# Patient Record
Sex: FEMALE | Race: WHITE | Employment: OTHER | URBAN - METROPOLITAN AREA
[De-identification: names, ages, dates, MRNs, and addresses within clinical notes are randomized per-mention and may not be internally consistent; named-entity substitution may affect disease eponyms.]

---

## 2017-02-17 ENCOUNTER — TELEPHONE (OUTPATIENT)
Dept: PULMONOLOGY | Age: 47
End: 2017-02-17

## 2017-03-24 PROBLEM — G89.4 CHRONIC PAIN SYNDROME: Status: ACTIVE | Noted: 2017-03-24

## 2017-04-05 ENCOUNTER — OFFICE VISIT (OUTPATIENT)
Dept: PULMONOLOGY | Age: 47
End: 2017-04-05

## 2017-04-05 VITALS
RESPIRATION RATE: 19 BRPM | HEART RATE: 78 BPM | SYSTOLIC BLOOD PRESSURE: 118 MMHG | HEIGHT: 64 IN | OXYGEN SATURATION: 93 % | BODY MASS INDEX: 35.68 KG/M2 | TEMPERATURE: 98.9 F | DIASTOLIC BLOOD PRESSURE: 76 MMHG | WEIGHT: 209 LBS

## 2017-04-05 DIAGNOSIS — Z23 NEED FOR PNEUMOCOCCAL VACCINE: ICD-10-CM

## 2017-04-05 DIAGNOSIS — J45.51 SEVERE PERSISTENT ASTHMA WITH ACUTE EXACERBATION: ICD-10-CM

## 2017-04-05 DIAGNOSIS — F11.10 DRUG ABUSE, OPIOID TYPE (HCC): ICD-10-CM

## 2017-04-05 DIAGNOSIS — J38.3 VOCAL CORD DYSFUNCTION: ICD-10-CM

## 2017-04-05 DIAGNOSIS — F41.9 ANXIETY: ICD-10-CM

## 2017-04-05 DIAGNOSIS — Z01.811 PREOP PULMONARY/RESPIRATORY EXAM: ICD-10-CM

## 2017-04-05 DIAGNOSIS — R05.9 COUGH: Primary | ICD-10-CM

## 2017-04-05 PROCEDURE — 99214 OFFICE O/P EST MOD 30 MIN: CPT | Performed by: NURSE PRACTITIONER

## 2017-04-05 PROCEDURE — 90732 PPSV23 VACC 2 YRS+ SUBQ/IM: CPT | Performed by: NURSE PRACTITIONER

## 2017-04-05 PROCEDURE — G0009 ADMIN PNEUMOCOCCAL VACCINE: HCPCS | Performed by: NURSE PRACTITIONER

## 2017-04-05 RX ORDER — GUAIFENESIN, PSEUDOEPHEDRINE HYDROCHLORIDE 600; 60 MG/1; MG/1
1 TABLET, EXTENDED RELEASE ORAL EVERY 12 HOURS
Qty: 14 TABLET | Refills: 0 | Status: SHIPPED | OUTPATIENT
Start: 2017-04-05 | End: 2017-04-12

## 2017-04-05 RX ORDER — IPRATROPIUM BROMIDE AND ALBUTEROL SULFATE 2.5; .5 MG/3ML; MG/3ML
1 SOLUTION RESPIRATORY (INHALATION) EVERY 6 HOURS
Status: ON HOLD | COMMUNITY
End: 2017-05-25

## 2017-04-05 ASSESSMENT — ASTHMA QUESTIONNAIRES
QUESTION_1 LAST FOUR WEEKS HOW MUCH OF THE TIME DID YOUR ASTHMA KEEP YOU FROM GETTING AS MUCH DONE AT WORK, SCHOOL OR AT HOME: 1
QUESTION_2 LAST FOUR WEEKS HOW OFTEN HAVE YOU HAD SHORTNESS OF BREATH: 1
QUESTION_5 LAST FOUR WEEKS HOW WOULD YOU RATE YOUR ASTHMA CONTROL: 1
QUESTION_3 LAST FOUR WEEKS HOW OFTEN DID YOUR ASTHMA SYMPTOMS (WHEEZING, COUGHING, SHORTNESS OF BREATH, CHEST TIGHTNESS OR PAIN) WAKE YOU UP AT NIGHT OR EARLIER THAN USUAL IN THE MORNING: 1
ACT_TOTALSCORE: 5
QUESTION_4 LAST FOUR WEEKS HOW OFTEN HAVE YOU USED YOUR RESCUE INHALER OR NEBULIZER MEDICATION (SUCH AS ALBUTEROL): 1

## 2017-05-05 ENCOUNTER — TELEPHONE (OUTPATIENT)
Dept: PULMONOLOGY | Age: 47
End: 2017-05-05

## 2017-05-16 ENCOUNTER — TELEPHONE (OUTPATIENT)
Dept: PULMONOLOGY | Age: 47
End: 2017-05-16

## 2017-05-16 RX ORDER — PREDNISONE 20 MG/1
40 TABLET ORAL DAILY
Qty: 10 TABLET | Refills: 0 | Status: ON HOLD | OUTPATIENT
Start: 2017-05-16 | End: 2017-05-25 | Stop reason: HOSPADM

## 2017-05-20 PROBLEM — R76.8 ELEVATED IGE LEVEL: Status: ACTIVE | Noted: 2017-05-20

## 2017-11-03 RX ORDER — BUDESONIDE AND FORMOTEROL FUMARATE DIHYDRATE 160; 4.5 UG/1; UG/1
AEROSOL RESPIRATORY (INHALATION)
Qty: 1 INHALER | Refills: 4 | Status: SHIPPED | OUTPATIENT
Start: 2017-11-03 | End: 2018-04-02 | Stop reason: SDUPTHER

## 2017-11-03 RX ORDER — IPRATROPIUM BROMIDE AND ALBUTEROL SULFATE 2.5; .5 MG/3ML; MG/3ML
SOLUTION RESPIRATORY (INHALATION)
Qty: 1 VIAL | Refills: 10 | Status: SHIPPED | OUTPATIENT
Start: 2017-11-03 | End: 2019-02-27 | Stop reason: SDUPTHER

## 2017-11-03 RX ORDER — BROMPHENIRAMINE MALEATE, PSEUDOEPHEDRINE HYDROCHLORIDE, AND DEXTROMETHORPHAN HYDROBROMIDE 2; 30; 10 MG/5ML; MG/5ML; MG/5ML
5 SYRUP ORAL 4 TIMES DAILY PRN
Qty: 1 BOTTLE | Refills: 0 | Status: ON HOLD | OUTPATIENT
Start: 2017-11-03 | End: 2018-02-15 | Stop reason: ALTCHOICE

## 2017-11-03 NOTE — TELEPHONE ENCOUNTER
Pt called stating that she needs everything that she got from the hospital, duoneb, symbicort, and bromfed, sent to [facility] instead [facility] (changed pharmacy)

## 2018-02-16 PROBLEM — E87.20 LACTIC ACIDOSIS: Status: ACTIVE | Noted: 2018-02-16

## 2018-02-16 PROBLEM — G89.4 CHRONIC PAIN SYNDROME: Status: RESOLVED | Noted: 2017-03-24 | Resolved: 2018-02-16

## 2018-02-16 PROBLEM — B34.8 INFECTION DUE TO HUMAN METAPNEUMOVIRUS (HMPV): Status: ACTIVE | Noted: 2018-02-16

## 2018-04-02 DIAGNOSIS — J45.901 ACUTE EXACERBATION OF COPD WITH ASTHMA (HCC): Primary | ICD-10-CM

## 2018-04-02 DIAGNOSIS — J44.1 ACUTE EXACERBATION OF COPD WITH ASTHMA (HCC): Primary | ICD-10-CM

## 2018-04-02 RX ORDER — BUDESONIDE AND FORMOTEROL FUMARATE DIHYDRATE 160; 4.5 UG/1; UG/1
AEROSOL RESPIRATORY (INHALATION)
Qty: 1 INHALER | Refills: 0 | Status: ON HOLD | OUTPATIENT
Start: 2018-04-02 | End: 2019-01-22 | Stop reason: SDUPTHER

## 2018-04-27 PROBLEM — R79.89 ELEVATED TROPONIN: Status: ACTIVE | Noted: 2018-04-27

## 2018-04-27 PROBLEM — R77.8 ELEVATED TROPONIN: Status: ACTIVE | Noted: 2018-04-27

## 2018-04-28 PROBLEM — J96.21 ACUTE ON CHRONIC RESPIRATORY FAILURE WITH HYPOXIA AND HYPERCAPNIA (HCC): Status: ACTIVE | Noted: 2018-04-28

## 2018-04-28 PROBLEM — J96.22 ACUTE ON CHRONIC RESPIRATORY FAILURE WITH HYPOXIA AND HYPERCAPNIA (HCC): Status: ACTIVE | Noted: 2018-04-28

## 2018-05-27 PROBLEM — R77.8 ELEVATED TROPONIN: Status: RESOLVED | Noted: 2018-04-27 | Resolved: 2018-05-27

## 2018-05-27 PROBLEM — R79.89 ELEVATED TROPONIN: Status: RESOLVED | Noted: 2018-04-27 | Resolved: 2018-05-27

## 2018-10-31 ENCOUNTER — TELEPHONE (OUTPATIENT)
Dept: PULMONOLOGY | Age: 48
End: 2018-10-31

## 2019-01-15 ENCOUNTER — HOSPITAL ENCOUNTER (INPATIENT)
Age: 49
LOS: 7 days | Discharge: HOME | DRG: 191 | End: 2019-01-22
Attending: EMERGENCY MEDICINE | Admitting: INTERNAL MEDICINE
Payer: COMMERCIAL

## 2019-01-15 ENCOUNTER — APPOINTMENT (OUTPATIENT)
Dept: GENERAL RADIOLOGY | Age: 49
DRG: 191 | End: 2019-01-15
Payer: COMMERCIAL

## 2019-01-15 DIAGNOSIS — J45.51 SEVERE PERSISTENT ASTHMA WITH EXACERBATION: Primary | ICD-10-CM

## 2019-01-15 PROBLEM — J96.11 CHRONIC RESPIRATORY FAILURE WITH HYPOXIA (HCC): Status: ACTIVE | Noted: 2019-01-15

## 2019-01-15 LAB
A/G RATIO: 1.2 (ref 1.1–2.2)
ALBUMIN SERPL-MCNC: 4.3 G/DL (ref 3.4–5)
ALP BLD-CCNC: 87 U/L (ref 40–129)
ALT SERPL-CCNC: 12 U/L (ref 10–40)
ANION GAP SERPL CALCULATED.3IONS-SCNC: 17 MMOL/L (ref 3–16)
AST SERPL-CCNC: 18 U/L (ref 15–37)
BASE EXCESS ARTERIAL: 0.1 MMOL/L (ref -3–3)
BASOPHILS ABSOLUTE: 0.1 K/UL (ref 0–0.2)
BASOPHILS RELATIVE PERCENT: 1.3 %
BILIRUB SERPL-MCNC: 0.5 MG/DL (ref 0–1)
BUN BLDV-MCNC: 17 MG/DL (ref 7–20)
CALCIUM SERPL-MCNC: 9.7 MG/DL (ref 8.3–10.6)
CARBOXYHEMOGLOBIN ARTERIAL: 2 % (ref 0–1.5)
CHLORIDE BLD-SCNC: 98 MMOL/L (ref 99–110)
CO2: 24 MMOL/L (ref 21–32)
CREAT SERPL-MCNC: 1.1 MG/DL (ref 0.6–1.1)
EOSINOPHILS ABSOLUTE: 1 K/UL (ref 0–0.6)
EOSINOPHILS RELATIVE PERCENT: 12.9 %
GFR AFRICAN AMERICAN: >60
GFR NON-AFRICAN AMERICAN: 52
GLOBULIN: 3.6 G/DL
GLUCOSE BLD-MCNC: 116 MG/DL (ref 70–99)
HCO3 ARTERIAL: 25 MMOL/L (ref 21–29)
HCT VFR BLD CALC: 43.6 % (ref 36–48)
HEMOGLOBIN, ART, EXTENDED: 14.7 G/DL (ref 12–16)
HEMOGLOBIN: 14.2 G/DL (ref 12–16)
LACTIC ACID, SEPSIS: 1.3 MMOL/L (ref 0.4–1.9)
LYMPHOCYTES ABSOLUTE: 1.5 K/UL (ref 1–5.1)
LYMPHOCYTES RELATIVE PERCENT: 19.5 %
MCH RBC QN AUTO: 27.4 PG (ref 26–34)
MCHC RBC AUTO-ENTMCNC: 32.6 G/DL (ref 31–36)
MCV RBC AUTO: 84.1 FL (ref 80–100)
METHEMOGLOBIN ARTERIAL: 0.6 %
MONOCYTES ABSOLUTE: 0.5 K/UL (ref 0–1.3)
MONOCYTES RELATIVE PERCENT: 5.9 %
NEUTROPHILS ABSOLUTE: 4.6 K/UL (ref 1.7–7.7)
NEUTROPHILS RELATIVE PERCENT: 60.4 %
O2 CONTENT ARTERIAL: 20 ML/DL
O2 SAT, ARTERIAL: 98.9 %
O2 THERAPY: ABNORMAL
PCO2 ARTERIAL: 41.2 MMHG (ref 35–45)
PDW BLD-RTO: 14.7 % (ref 12.4–15.4)
PH ARTERIAL: 7.42 (ref 7.35–7.45)
PLATELET # BLD: 172 K/UL (ref 135–450)
PMV BLD AUTO: 7.7 FL (ref 5–10.5)
PO2 ARTERIAL: 80.2 MMHG (ref 75–108)
POTASSIUM REFLEX MAGNESIUM: 4.9 MMOL/L (ref 3.5–5.1)
PRO-BNP: 59 PG/ML (ref 0–124)
RAPID INFLUENZA  B AGN: NEGATIVE
RAPID INFLUENZA A AGN: NEGATIVE
RBC # BLD: 5.19 M/UL (ref 4–5.2)
SODIUM BLD-SCNC: 139 MMOL/L (ref 136–145)
TCO2 ARTERIAL: 25 MMOL/L
TOTAL PROTEIN: 7.9 G/DL (ref 6.4–8.2)
TROPONIN: <0.01 NG/ML
WBC # BLD: 7.7 K/UL (ref 4–11)

## 2019-01-15 PROCEDURE — 36600 WITHDRAWAL OF ARTERIAL BLOOD: CPT

## 2019-01-15 PROCEDURE — 87040 BLOOD CULTURE FOR BACTERIA: CPT

## 2019-01-15 PROCEDURE — 6370000000 HC RX 637 (ALT 250 FOR IP): Performed by: EMERGENCY MEDICINE

## 2019-01-15 PROCEDURE — 2580000003 HC RX 258: Performed by: INTERNAL MEDICINE

## 2019-01-15 PROCEDURE — 6360000002 HC RX W HCPCS: Performed by: EMERGENCY MEDICINE

## 2019-01-15 PROCEDURE — 94664 DEMO&/EVAL PT USE INHALER: CPT

## 2019-01-15 PROCEDURE — 2060000000 HC ICU INTERMEDIATE R&B

## 2019-01-15 PROCEDURE — 93005 ELECTROCARDIOGRAM TRACING: CPT | Performed by: EMERGENCY MEDICINE

## 2019-01-15 PROCEDURE — 96361 HYDRATE IV INFUSION ADD-ON: CPT

## 2019-01-15 PROCEDURE — 6370000000 HC RX 637 (ALT 250 FOR IP): Performed by: INTERNAL MEDICINE

## 2019-01-15 PROCEDURE — 87804 INFLUENZA ASSAY W/OPTIC: CPT

## 2019-01-15 PROCEDURE — 94762 N-INVAS EAR/PLS OXIMTRY CONT: CPT

## 2019-01-15 PROCEDURE — 83880 ASSAY OF NATRIURETIC PEPTIDE: CPT

## 2019-01-15 PROCEDURE — 6360000002 HC RX W HCPCS: Performed by: INTERNAL MEDICINE

## 2019-01-15 PROCEDURE — 84484 ASSAY OF TROPONIN QUANT: CPT

## 2019-01-15 PROCEDURE — 82803 BLOOD GASES ANY COMBINATION: CPT

## 2019-01-15 PROCEDURE — 96376 TX/PRO/DX INJ SAME DRUG ADON: CPT

## 2019-01-15 PROCEDURE — 96365 THER/PROPH/DIAG IV INF INIT: CPT

## 2019-01-15 PROCEDURE — 83605 ASSAY OF LACTIC ACID: CPT

## 2019-01-15 PROCEDURE — 2700000000 HC OXYGEN THERAPY PER DAY

## 2019-01-15 PROCEDURE — 96375 TX/PRO/DX INJ NEW DRUG ADDON: CPT

## 2019-01-15 PROCEDURE — 80053 COMPREHEN METABOLIC PANEL: CPT

## 2019-01-15 PROCEDURE — 94640 AIRWAY INHALATION TREATMENT: CPT

## 2019-01-15 PROCEDURE — 2580000003 HC RX 258: Performed by: EMERGENCY MEDICINE

## 2019-01-15 PROCEDURE — 99285 EMERGENCY DEPT VISIT HI MDM: CPT

## 2019-01-15 PROCEDURE — 71045 X-RAY EXAM CHEST 1 VIEW: CPT

## 2019-01-15 PROCEDURE — 96374 THER/PROPH/DIAG INJ IV PUSH: CPT

## 2019-01-15 PROCEDURE — 85025 COMPLETE CBC W/AUTO DIFF WBC: CPT

## 2019-01-15 RX ORDER — METHYLPREDNISOLONE SODIUM SUCCINATE 125 MG/2ML
125 INJECTION, POWDER, LYOPHILIZED, FOR SOLUTION INTRAMUSCULAR; INTRAVENOUS ONCE
Status: COMPLETED | OUTPATIENT
Start: 2019-01-15 | End: 2019-01-15

## 2019-01-15 RX ORDER — SODIUM CHLORIDE 0.9 % (FLUSH) 0.9 %
10 SYRINGE (ML) INJECTION PRN
Status: DISCONTINUED | OUTPATIENT
Start: 2019-01-15 | End: 2019-01-22 | Stop reason: HOSPADM

## 2019-01-15 RX ORDER — ALBUTEROL SULFATE 2.5 MG/3ML
5 SOLUTION RESPIRATORY (INHALATION) ONCE
Status: COMPLETED | OUTPATIENT
Start: 2019-01-15 | End: 2019-01-15

## 2019-01-15 RX ORDER — IPRATROPIUM BROMIDE AND ALBUTEROL SULFATE 2.5; .5 MG/3ML; MG/3ML
1 SOLUTION RESPIRATORY (INHALATION)
Status: DISCONTINUED | OUTPATIENT
Start: 2019-01-15 | End: 2019-01-22 | Stop reason: HOSPADM

## 2019-01-15 RX ORDER — ALPRAZOLAM 0.5 MG/1
1 TABLET ORAL 4 TIMES DAILY
Status: DISCONTINUED | OUTPATIENT
Start: 2019-01-15 | End: 2019-01-22 | Stop reason: HOSPADM

## 2019-01-15 RX ORDER — OXYCODONE HYDROCHLORIDE 5 MG/1
15 TABLET ORAL EVERY 4 HOURS PRN
Status: DISCONTINUED | OUTPATIENT
Start: 2019-01-15 | End: 2019-01-22 | Stop reason: HOSPADM

## 2019-01-15 RX ORDER — MONTELUKAST SODIUM 10 MG/1
10 TABLET ORAL NIGHTLY
Status: DISCONTINUED | OUTPATIENT
Start: 2019-01-15 | End: 2019-01-22 | Stop reason: HOSPADM

## 2019-01-15 RX ORDER — THEOPHYLLINE 400 MG/1
400 TABLET, EXTENDED RELEASE ORAL DAILY
Status: DISCONTINUED | OUTPATIENT
Start: 2019-01-15 | End: 2019-01-22 | Stop reason: HOSPADM

## 2019-01-15 RX ORDER — METHADONE HYDROCHLORIDE 10 MG/1
20 TABLET ORAL EVERY EVENING
Status: DISCONTINUED | OUTPATIENT
Start: 2019-01-15 | End: 2019-01-22 | Stop reason: HOSPADM

## 2019-01-15 RX ORDER — IPRATROPIUM BROMIDE AND ALBUTEROL SULFATE 2.5; .5 MG/3ML; MG/3ML
3 SOLUTION RESPIRATORY (INHALATION) ONCE
Status: COMPLETED | OUTPATIENT
Start: 2019-01-15 | End: 2019-01-15

## 2019-01-15 RX ORDER — ONDANSETRON 2 MG/ML
4 INJECTION INTRAMUSCULAR; INTRAVENOUS EVERY 4 HOURS PRN
Status: DISCONTINUED | OUTPATIENT
Start: 2019-01-15 | End: 2019-01-22 | Stop reason: HOSPADM

## 2019-01-15 RX ORDER — BISACODYL 10 MG
10 SUPPOSITORY, RECTAL RECTAL DAILY PRN
Status: DISCONTINUED | OUTPATIENT
Start: 2019-01-15 | End: 2019-01-22 | Stop reason: HOSPADM

## 2019-01-15 RX ORDER — FLUTICASONE PROPIONATE 50 MCG
1 SPRAY, SUSPENSION (ML) NASAL NIGHTLY
Status: DISCONTINUED | OUTPATIENT
Start: 2019-01-15 | End: 2019-01-22 | Stop reason: HOSPADM

## 2019-01-15 RX ORDER — DOCUSATE SODIUM 100 MG/1
100 CAPSULE, LIQUID FILLED ORAL 2 TIMES DAILY PRN
Status: DISCONTINUED | OUTPATIENT
Start: 2019-01-15 | End: 2019-01-22 | Stop reason: HOSPADM

## 2019-01-15 RX ORDER — METHYLPREDNISOLONE SODIUM SUCCINATE 40 MG/ML
40 INJECTION, POWDER, LYOPHILIZED, FOR SOLUTION INTRAMUSCULAR; INTRAVENOUS EVERY 6 HOURS SCHEDULED
Status: DISCONTINUED | OUTPATIENT
Start: 2019-01-15 | End: 2019-01-16

## 2019-01-15 RX ORDER — METHADONE HYDROCHLORIDE 10 MG/1
30 TABLET ORAL EVERY MORNING
Status: DISCONTINUED | OUTPATIENT
Start: 2019-01-16 | End: 2019-01-22 | Stop reason: HOSPADM

## 2019-01-15 RX ORDER — PREDNISONE 20 MG/1
60 TABLET ORAL ONCE
Status: DISCONTINUED | OUTPATIENT
Start: 2019-01-15 | End: 2019-01-15

## 2019-01-15 RX ORDER — 0.9 % SODIUM CHLORIDE 0.9 %
1000 INTRAVENOUS SOLUTION INTRAVENOUS ONCE
Status: COMPLETED | OUTPATIENT
Start: 2019-01-15 | End: 2019-01-15

## 2019-01-15 RX ORDER — ONDANSETRON 2 MG/ML
4 INJECTION INTRAMUSCULAR; INTRAVENOUS ONCE
Status: COMPLETED | OUTPATIENT
Start: 2019-01-15 | End: 2019-01-15

## 2019-01-15 RX ORDER — FAMOTIDINE 20 MG/1
20 TABLET, FILM COATED ORAL 2 TIMES DAILY
Status: DISCONTINUED | OUTPATIENT
Start: 2019-01-15 | End: 2019-01-22 | Stop reason: HOSPADM

## 2019-01-15 RX ORDER — OXYMETAZOLINE HYDROCHLORIDE 0.05 G/100ML
2 SPRAY NASAL 2 TIMES DAILY PRN
Status: ON HOLD | COMMUNITY
End: 2022-01-20 | Stop reason: SDUPTHER

## 2019-01-15 RX ORDER — LORAZEPAM 2 MG/ML
1 INJECTION INTRAMUSCULAR ONCE
Status: COMPLETED | OUTPATIENT
Start: 2019-01-15 | End: 2019-01-15

## 2019-01-15 RX ORDER — SODIUM CHLORIDE 0.9 % (FLUSH) 0.9 %
10 SYRINGE (ML) INJECTION EVERY 12 HOURS SCHEDULED
Status: DISCONTINUED | OUTPATIENT
Start: 2019-01-15 | End: 2019-01-22 | Stop reason: HOSPADM

## 2019-01-15 RX ADMIN — IPRATROPIUM BROMIDE AND ALBUTEROL SULFATE 1 AMPULE: 2.5; .5 SOLUTION RESPIRATORY (INHALATION) at 20:07

## 2019-01-15 RX ADMIN — ALPRAZOLAM 1 MG: 0.5 TABLET ORAL at 21:16

## 2019-01-15 RX ADMIN — SODIUM CHLORIDE 1000 ML: 9 INJECTION, SOLUTION INTRAVENOUS at 12:48

## 2019-01-15 RX ADMIN — OXYCODONE HYDROCHLORIDE 15 MG: 5 TABLET ORAL at 21:15

## 2019-01-15 RX ADMIN — ALBUTEROL SULFATE 5 MG: 2.5 SOLUTION RESPIRATORY (INHALATION) at 14:05

## 2019-01-15 RX ADMIN — FAMOTIDINE 20 MG: 20 TABLET ORAL at 21:16

## 2019-01-15 RX ADMIN — MONTELUKAST SODIUM 10 MG: 10 TABLET, FILM COATED ORAL at 21:16

## 2019-01-15 RX ADMIN — MOMETASONE FUROATE AND FORMOTEROL FUMARATE DIHYDRATE 2 PUFF: 200; 5 AEROSOL RESPIRATORY (INHALATION) at 20:07

## 2019-01-15 RX ADMIN — FLUTICASONE PROPIONATE 1 SPRAY: 50 SPRAY, METERED NASAL at 21:16

## 2019-01-15 RX ADMIN — ALBUTEROL SULFATE 5 MG: 2.5 SOLUTION RESPIRATORY (INHALATION) at 15:05

## 2019-01-15 RX ADMIN — LORAZEPAM 1 MG: 2 INJECTION INTRAMUSCULAR; INTRAVENOUS at 12:25

## 2019-01-15 RX ADMIN — AZITHROMYCIN MONOHYDRATE 500 MG: 500 INJECTION, POWDER, LYOPHILIZED, FOR SOLUTION INTRAVENOUS at 21:16

## 2019-01-15 RX ADMIN — Medication 10 ML: at 21:22

## 2019-01-15 RX ADMIN — OXYCODONE HYDROCHLORIDE 15 MG: 5 TABLET ORAL at 16:57

## 2019-01-15 RX ADMIN — METHADONE HYDROCHLORIDE 20 MG: 10 TABLET ORAL at 16:57

## 2019-01-15 RX ADMIN — HYDROMORPHONE HYDROCHLORIDE 1 MG: 1 INJECTION, SOLUTION INTRAMUSCULAR; INTRAVENOUS; SUBCUTANEOUS at 13:23

## 2019-01-15 RX ADMIN — METHYLPREDNISOLONE SODIUM SUCCINATE 125 MG: 125 INJECTION, POWDER, FOR SOLUTION INTRAMUSCULAR; INTRAVENOUS at 12:28

## 2019-01-15 RX ADMIN — ONDANSETRON 4 MG: 2 INJECTION INTRAMUSCULAR; INTRAVENOUS at 13:23

## 2019-01-15 RX ADMIN — ALPRAZOLAM 1 MG: 0.5 TABLET ORAL at 16:56

## 2019-01-15 RX ADMIN — METHYLPREDNISOLONE SODIUM SUCCINATE 40 MG: 40 INJECTION, POWDER, FOR SOLUTION INTRAMUSCULAR; INTRAVENOUS at 18:53

## 2019-01-15 RX ADMIN — IPRATROPIUM BROMIDE AND ALBUTEROL SULFATE 3 AMPULE: .5; 3 SOLUTION RESPIRATORY (INHALATION) at 12:15

## 2019-01-15 ASSESSMENT — PAIN DESCRIPTION - PAIN TYPE
TYPE: CHRONIC PAIN
TYPE: CHRONIC PAIN

## 2019-01-15 ASSESSMENT — PAIN SCALES - GENERAL
PAINLEVEL_OUTOF10: 8
PAINLEVEL_OUTOF10: 8
PAINLEVEL_OUTOF10: 4
PAINLEVEL_OUTOF10: 7
PAINLEVEL_OUTOF10: 9
PAINLEVEL_OUTOF10: 6
PAINLEVEL_OUTOF10: 4

## 2019-01-15 ASSESSMENT — PAIN DESCRIPTION - LOCATION
LOCATION: BACK;HEAD
LOCATION: LEG;KNEE

## 2019-01-15 ASSESSMENT — PAIN DESCRIPTION - ORIENTATION: ORIENTATION: RIGHT;LEFT

## 2019-01-16 LAB
ANION GAP SERPL CALCULATED.3IONS-SCNC: 13 MMOL/L (ref 3–16)
BASOPHILS ABSOLUTE: 0 K/UL (ref 0–0.2)
BASOPHILS RELATIVE PERCENT: 0.3 %
BUN BLDV-MCNC: 21 MG/DL (ref 7–20)
CALCIUM SERPL-MCNC: 9.5 MG/DL (ref 8.3–10.6)
CHLORIDE BLD-SCNC: 100 MMOL/L (ref 99–110)
CO2: 25 MMOL/L (ref 21–32)
CREAT SERPL-MCNC: 0.8 MG/DL (ref 0.6–1.1)
EKG ATRIAL RATE: 135 BPM
EKG DIAGNOSIS: NORMAL
EKG P AXIS: 78 DEGREES
EKG P-R INTERVAL: 130 MS
EKG Q-T INTERVAL: 286 MS
EKG QRS DURATION: 72 MS
EKG QTC CALCULATION (BAZETT): 429 MS
EKG R AXIS: 43 DEGREES
EKG T AXIS: 53 DEGREES
EKG VENTRICULAR RATE: 135 BPM
EOSINOPHILS ABSOLUTE: 0 K/UL (ref 0–0.6)
EOSINOPHILS RELATIVE PERCENT: 0 %
GFR AFRICAN AMERICAN: >60
GFR NON-AFRICAN AMERICAN: >60
GLUCOSE BLD-MCNC: 138 MG/DL (ref 70–99)
HCT VFR BLD CALC: 39.3 % (ref 36–48)
HEMOGLOBIN: 13.2 G/DL (ref 12–16)
LYMPHOCYTES ABSOLUTE: 0.5 K/UL (ref 1–5.1)
LYMPHOCYTES RELATIVE PERCENT: 5.6 %
MCH RBC QN AUTO: 28.2 PG (ref 26–34)
MCHC RBC AUTO-ENTMCNC: 33.5 G/DL (ref 31–36)
MCV RBC AUTO: 84 FL (ref 80–100)
MONOCYTES ABSOLUTE: 0.1 K/UL (ref 0–1.3)
MONOCYTES RELATIVE PERCENT: 1.4 %
NEUTROPHILS ABSOLUTE: 7.6 K/UL (ref 1.7–7.7)
NEUTROPHILS RELATIVE PERCENT: 92.7 %
PDW BLD-RTO: 14.8 % (ref 12.4–15.4)
PLATELET # BLD: 223 K/UL (ref 135–450)
PMV BLD AUTO: 7.7 FL (ref 5–10.5)
POTASSIUM REFLEX MAGNESIUM: 4.7 MMOL/L (ref 3.5–5.1)
RBC # BLD: 4.68 M/UL (ref 4–5.2)
REPORT: NORMAL
RESPIRATORY PANEL PCR: NORMAL
SODIUM BLD-SCNC: 138 MMOL/L (ref 136–145)
WBC # BLD: 8.2 K/UL (ref 4–11)

## 2019-01-16 PROCEDURE — 87633 RESP VIRUS 12-25 TARGETS: CPT

## 2019-01-16 PROCEDURE — 99223 1ST HOSP IP/OBS HIGH 75: CPT | Performed by: INTERNAL MEDICINE

## 2019-01-16 PROCEDURE — 94640 AIRWAY INHALATION TREATMENT: CPT

## 2019-01-16 PROCEDURE — 96376 TX/PRO/DX INJ SAME DRUG ADON: CPT

## 2019-01-16 PROCEDURE — 2700000000 HC OXYGEN THERAPY PER DAY

## 2019-01-16 PROCEDURE — 6370000000 HC RX 637 (ALT 250 FOR IP): Performed by: INTERNAL MEDICINE

## 2019-01-16 PROCEDURE — 96366 THER/PROPH/DIAG IV INF ADDON: CPT

## 2019-01-16 PROCEDURE — 6360000002 HC RX W HCPCS: Performed by: INTERNAL MEDICINE

## 2019-01-16 PROCEDURE — 36415 COLL VENOUS BLD VENIPUNCTURE: CPT

## 2019-01-16 PROCEDURE — 87798 DETECT AGENT NOS DNA AMP: CPT

## 2019-01-16 PROCEDURE — 87581 M.PNEUMON DNA AMP PROBE: CPT

## 2019-01-16 PROCEDURE — 2060000000 HC ICU INTERMEDIATE R&B

## 2019-01-16 PROCEDURE — 93010 ELECTROCARDIOGRAM REPORT: CPT | Performed by: INTERNAL MEDICINE

## 2019-01-16 PROCEDURE — 94761 N-INVAS EAR/PLS OXIMETRY MLT: CPT

## 2019-01-16 PROCEDURE — 85025 COMPLETE CBC W/AUTO DIFF WBC: CPT

## 2019-01-16 PROCEDURE — 87486 CHLMYD PNEUM DNA AMP PROBE: CPT

## 2019-01-16 PROCEDURE — 80048 BASIC METABOLIC PNL TOTAL CA: CPT

## 2019-01-16 PROCEDURE — 2580000003 HC RX 258: Performed by: INTERNAL MEDICINE

## 2019-01-16 RX ORDER — FORMOTEROL FUMARATE 20 UG/2ML
20 SOLUTION RESPIRATORY (INHALATION) 2 TIMES DAILY
Status: DISCONTINUED | OUTPATIENT
Start: 2019-01-16 | End: 2019-01-22 | Stop reason: HOSPADM

## 2019-01-16 RX ORDER — BUDESONIDE 0.5 MG/2ML
0.5 INHALANT ORAL 2 TIMES DAILY
Status: DISCONTINUED | OUTPATIENT
Start: 2019-01-16 | End: 2019-01-22 | Stop reason: HOSPADM

## 2019-01-16 RX ORDER — METHYLPREDNISOLONE SODIUM SUCCINATE 125 MG/2ML
60 INJECTION, POWDER, LYOPHILIZED, FOR SOLUTION INTRAMUSCULAR; INTRAVENOUS EVERY 6 HOURS SCHEDULED
Status: DISCONTINUED | OUTPATIENT
Start: 2019-01-16 | End: 2019-01-17

## 2019-01-16 RX ADMIN — FAMOTIDINE 20 MG: 20 TABLET ORAL at 09:28

## 2019-01-16 RX ADMIN — IPRATROPIUM BROMIDE AND ALBUTEROL SULFATE 1 AMPULE: 2.5; .5 SOLUTION RESPIRATORY (INHALATION) at 08:16

## 2019-01-16 RX ADMIN — Medication 10 ML: at 09:33

## 2019-01-16 RX ADMIN — Medication 10 ML: at 20:50

## 2019-01-16 RX ADMIN — OXYCODONE HYDROCHLORIDE 15 MG: 5 TABLET ORAL at 01:13

## 2019-01-16 RX ADMIN — METHYLPREDNISOLONE SODIUM SUCCINATE 60 MG: 125 INJECTION, POWDER, FOR SOLUTION INTRAMUSCULAR; INTRAVENOUS at 16:44

## 2019-01-16 RX ADMIN — MONTELUKAST SODIUM 10 MG: 10 TABLET, FILM COATED ORAL at 20:48

## 2019-01-16 RX ADMIN — IPRATROPIUM BROMIDE AND ALBUTEROL SULFATE 1 AMPULE: 2.5; .5 SOLUTION RESPIRATORY (INHALATION) at 19:35

## 2019-01-16 RX ADMIN — BUDESONIDE 500 MCG: 0.5 SUSPENSION RESPIRATORY (INHALATION) at 19:35

## 2019-01-16 RX ADMIN — OXYCODONE HYDROCHLORIDE 15 MG: 5 TABLET ORAL at 23:33

## 2019-01-16 RX ADMIN — METHYLPREDNISOLONE SODIUM SUCCINATE 40 MG: 40 INJECTION, POWDER, FOR SOLUTION INTRAMUSCULAR; INTRAVENOUS at 10:09

## 2019-01-16 RX ADMIN — METHYLPREDNISOLONE SODIUM SUCCINATE 40 MG: 40 INJECTION, POWDER, FOR SOLUTION INTRAMUSCULAR; INTRAVENOUS at 00:41

## 2019-01-16 RX ADMIN — ALPRAZOLAM 1 MG: 0.5 TABLET ORAL at 05:21

## 2019-01-16 RX ADMIN — FORMOTEROL FUMARATE DIHYDRATE 20 MCG: 20 SOLUTION RESPIRATORY (INHALATION) at 12:19

## 2019-01-16 RX ADMIN — ALPRAZOLAM 1 MG: 0.5 TABLET ORAL at 14:43

## 2019-01-16 RX ADMIN — METHYLPREDNISOLONE SODIUM SUCCINATE 40 MG: 40 INJECTION, POWDER, FOR SOLUTION INTRAMUSCULAR; INTRAVENOUS at 05:21

## 2019-01-16 RX ADMIN — OXYCODONE HYDROCHLORIDE 15 MG: 5 TABLET ORAL at 09:33

## 2019-01-16 RX ADMIN — OXYCODONE HYDROCHLORIDE 15 MG: 5 TABLET ORAL at 19:29

## 2019-01-16 RX ADMIN — OXYCODONE HYDROCHLORIDE 15 MG: 5 TABLET ORAL at 05:21

## 2019-01-16 RX ADMIN — IPRATROPIUM BROMIDE AND ALBUTEROL SULFATE 1 AMPULE: 2.5; .5 SOLUTION RESPIRATORY (INHALATION) at 12:19

## 2019-01-16 RX ADMIN — IPRATROPIUM BROMIDE AND ALBUTEROL SULFATE 1 AMPULE: 2.5; .5 SOLUTION RESPIRATORY (INHALATION) at 15:40

## 2019-01-16 RX ADMIN — MOMETASONE FUROATE AND FORMOTEROL FUMARATE DIHYDRATE 2 PUFF: 200; 5 AEROSOL RESPIRATORY (INHALATION) at 08:16

## 2019-01-16 RX ADMIN — METHADONE HYDROCHLORIDE 30 MG: 10 TABLET ORAL at 05:21

## 2019-01-16 RX ADMIN — FAMOTIDINE 20 MG: 20 TABLET ORAL at 20:48

## 2019-01-16 RX ADMIN — OXYCODONE HYDROCHLORIDE 15 MG: 5 TABLET ORAL at 14:38

## 2019-01-16 RX ADMIN — FLUTICASONE PROPIONATE 1 SPRAY: 50 SPRAY, METERED NASAL at 23:35

## 2019-01-16 RX ADMIN — FORMOTEROL FUMARATE DIHYDRATE 20 MCG: 20 SOLUTION RESPIRATORY (INHALATION) at 19:35

## 2019-01-16 RX ADMIN — ALPRAZOLAM 1 MG: 0.5 TABLET ORAL at 10:08

## 2019-01-16 RX ADMIN — METHADONE HYDROCHLORIDE 20 MG: 10 TABLET ORAL at 16:44

## 2019-01-16 RX ADMIN — BUDESONIDE 500 MCG: 0.5 SUSPENSION RESPIRATORY (INHALATION) at 12:19

## 2019-01-16 RX ADMIN — ALPRAZOLAM 1 MG: 0.5 TABLET ORAL at 20:48

## 2019-01-16 RX ADMIN — AZITHROMYCIN MONOHYDRATE 500 MG: 500 INJECTION, POWDER, LYOPHILIZED, FOR SOLUTION INTRAVENOUS at 20:57

## 2019-01-16 RX ADMIN — THEOPHYLLINE 400 MG: 400 TABLET, EXTENDED RELEASE ORAL at 09:28

## 2019-01-16 ASSESSMENT — PAIN DESCRIPTION - ONSET: ONSET: ON-GOING

## 2019-01-16 ASSESSMENT — PAIN DESCRIPTION - PAIN TYPE
TYPE: CHRONIC PAIN

## 2019-01-16 ASSESSMENT — PAIN SCALES - GENERAL
PAINLEVEL_OUTOF10: 8
PAINLEVEL_OUTOF10: 5
PAINLEVEL_OUTOF10: 8
PAINLEVEL_OUTOF10: 7
PAINLEVEL_OUTOF10: 4
PAINLEVEL_OUTOF10: 4
PAINLEVEL_OUTOF10: 7

## 2019-01-16 ASSESSMENT — PAIN DESCRIPTION - LOCATION
LOCATION: KNEE;LEG;CHEST
LOCATION: KNEE

## 2019-01-16 ASSESSMENT — PAIN DESCRIPTION - ORIENTATION
ORIENTATION: RIGHT;LEFT
ORIENTATION: RIGHT;LEFT

## 2019-01-16 ASSESSMENT — PAIN - FUNCTIONAL ASSESSMENT
PAIN_FUNCTIONAL_ASSESSMENT: PREVENTS OR INTERFERES SOME ACTIVE ACTIVITIES AND ADLS
PAIN_FUNCTIONAL_ASSESSMENT: PREVENTS OR INTERFERES SOME ACTIVE ACTIVITIES AND ADLS

## 2019-01-16 ASSESSMENT — PAIN DESCRIPTION - DESCRIPTORS: DESCRIPTORS: ACHING

## 2019-01-17 LAB
ANION GAP SERPL CALCULATED.3IONS-SCNC: 16 MMOL/L (ref 3–16)
BASOPHILS ABSOLUTE: 0 K/UL (ref 0–0.2)
BASOPHILS RELATIVE PERCENT: 0.3 %
BUN BLDV-MCNC: 24 MG/DL (ref 7–20)
CALCIUM SERPL-MCNC: 9.9 MG/DL (ref 8.3–10.6)
CHLORIDE BLD-SCNC: 95 MMOL/L (ref 99–110)
CO2: 26 MMOL/L (ref 21–32)
CREAT SERPL-MCNC: 0.8 MG/DL (ref 0.6–1.1)
EOSINOPHILS ABSOLUTE: 0 K/UL (ref 0–0.6)
EOSINOPHILS RELATIVE PERCENT: 0.1 %
GFR AFRICAN AMERICAN: >60
GFR NON-AFRICAN AMERICAN: >60
GLUCOSE BLD-MCNC: 132 MG/DL (ref 70–99)
HCT VFR BLD CALC: 43.8 % (ref 36–48)
HEMOGLOBIN: 14.4 G/DL (ref 12–16)
LYMPHOCYTES ABSOLUTE: 0.5 K/UL (ref 1–5.1)
LYMPHOCYTES RELATIVE PERCENT: 4.6 %
MCH RBC QN AUTO: 27.2 PG (ref 26–34)
MCHC RBC AUTO-ENTMCNC: 32.8 G/DL (ref 31–36)
MCV RBC AUTO: 83 FL (ref 80–100)
MONOCYTES ABSOLUTE: 0.3 K/UL (ref 0–1.3)
MONOCYTES RELATIVE PERCENT: 3.1 %
NEUTROPHILS ABSOLUTE: 10 K/UL (ref 1.7–7.7)
NEUTROPHILS RELATIVE PERCENT: 91.9 %
PDW BLD-RTO: 15.1 % (ref 12.4–15.4)
PLATELET # BLD: 286 K/UL (ref 135–450)
PMV BLD AUTO: 7.6 FL (ref 5–10.5)
POTASSIUM REFLEX MAGNESIUM: 4.2 MMOL/L (ref 3.5–5.1)
RBC # BLD: 5.27 M/UL (ref 4–5.2)
SODIUM BLD-SCNC: 137 MMOL/L (ref 136–145)
THEOPHYLLINE LEVEL: 4.5 UG/ML (ref 8–20)
WBC # BLD: 10.9 K/UL (ref 4–11)

## 2019-01-17 PROCEDURE — 6360000002 HC RX W HCPCS: Performed by: INTERNAL MEDICINE

## 2019-01-17 PROCEDURE — 94640 AIRWAY INHALATION TREATMENT: CPT

## 2019-01-17 PROCEDURE — 99233 SBSQ HOSP IP/OBS HIGH 50: CPT | Performed by: INTERNAL MEDICINE

## 2019-01-17 PROCEDURE — 80198 ASSAY OF THEOPHYLLINE: CPT

## 2019-01-17 PROCEDURE — 6370000000 HC RX 637 (ALT 250 FOR IP): Performed by: INTERNAL MEDICINE

## 2019-01-17 PROCEDURE — 2700000000 HC OXYGEN THERAPY PER DAY

## 2019-01-17 PROCEDURE — 85025 COMPLETE CBC W/AUTO DIFF WBC: CPT

## 2019-01-17 PROCEDURE — 2060000000 HC ICU INTERMEDIATE R&B

## 2019-01-17 PROCEDURE — 2580000003 HC RX 258: Performed by: INTERNAL MEDICINE

## 2019-01-17 PROCEDURE — 80048 BASIC METABOLIC PNL TOTAL CA: CPT

## 2019-01-17 PROCEDURE — 96376 TX/PRO/DX INJ SAME DRUG ADON: CPT

## 2019-01-17 PROCEDURE — 94761 N-INVAS EAR/PLS OXIMETRY MLT: CPT

## 2019-01-17 PROCEDURE — 36415 COLL VENOUS BLD VENIPUNCTURE: CPT

## 2019-01-17 PROCEDURE — 96366 THER/PROPH/DIAG IV INF ADDON: CPT

## 2019-01-17 RX ORDER — ZOLPIDEM TARTRATE 5 MG/1
5 TABLET ORAL NIGHTLY PRN
Status: DISCONTINUED | OUTPATIENT
Start: 2019-01-17 | End: 2019-01-22 | Stop reason: HOSPADM

## 2019-01-17 RX ORDER — METHYLPREDNISOLONE SODIUM SUCCINATE 125 MG/2ML
60 INJECTION, POWDER, LYOPHILIZED, FOR SOLUTION INTRAMUSCULAR; INTRAVENOUS EVERY 8 HOURS
Status: DISCONTINUED | OUTPATIENT
Start: 2019-01-17 | End: 2019-01-18

## 2019-01-17 RX ORDER — GUAIFENESIN/DEXTROMETHORPHAN 100-10MG/5
10 SYRUP ORAL EVERY 4 HOURS PRN
Status: DISCONTINUED | OUTPATIENT
Start: 2019-01-17 | End: 2019-01-22 | Stop reason: HOSPADM

## 2019-01-17 RX ADMIN — METHADONE HYDROCHLORIDE 30 MG: 10 TABLET ORAL at 04:30

## 2019-01-17 RX ADMIN — Medication 10 ML: at 08:28

## 2019-01-17 RX ADMIN — IPRATROPIUM BROMIDE AND ALBUTEROL SULFATE 1 AMPULE: 2.5; .5 SOLUTION RESPIRATORY (INHALATION) at 07:51

## 2019-01-17 RX ADMIN — ALPRAZOLAM 1 MG: 0.5 TABLET ORAL at 04:30

## 2019-01-17 RX ADMIN — THEOPHYLLINE 400 MG: 400 TABLET, EXTENDED RELEASE ORAL at 08:27

## 2019-01-17 RX ADMIN — FORMOTEROL FUMARATE DIHYDRATE 20 MCG: 20 SOLUTION RESPIRATORY (INHALATION) at 07:51

## 2019-01-17 RX ADMIN — GUAIFENESIN AND DEXTROMETHORPHAN 10 ML: 100; 10 SYRUP ORAL at 16:41

## 2019-01-17 RX ADMIN — OXYCODONE HYDROCHLORIDE 15 MG: 5 TABLET ORAL at 08:27

## 2019-01-17 RX ADMIN — ALPRAZOLAM 1 MG: 0.5 TABLET ORAL at 20:55

## 2019-01-17 RX ADMIN — METHYLPREDNISOLONE SODIUM SUCCINATE 60 MG: 125 INJECTION, POWDER, FOR SOLUTION INTRAMUSCULAR; INTRAVENOUS at 13:35

## 2019-01-17 RX ADMIN — IPRATROPIUM BROMIDE AND ALBUTEROL SULFATE 1 AMPULE: 2.5; .5 SOLUTION RESPIRATORY (INHALATION) at 11:44

## 2019-01-17 RX ADMIN — ALTEPLASE 1 MG: 2.2 INJECTION, POWDER, LYOPHILIZED, FOR SOLUTION INTRAVENOUS at 15:49

## 2019-01-17 RX ADMIN — OXYCODONE HYDROCHLORIDE 15 MG: 5 TABLET ORAL at 21:03

## 2019-01-17 RX ADMIN — IPRATROPIUM BROMIDE AND ALBUTEROL SULFATE 1 AMPULE: 2.5; .5 SOLUTION RESPIRATORY (INHALATION) at 15:54

## 2019-01-17 RX ADMIN — FLUTICASONE PROPIONATE 1 SPRAY: 50 SPRAY, METERED NASAL at 20:59

## 2019-01-17 RX ADMIN — BUDESONIDE 500 MCG: 0.5 SUSPENSION RESPIRATORY (INHALATION) at 07:51

## 2019-01-17 RX ADMIN — IPRATROPIUM BROMIDE AND ALBUTEROL SULFATE 1 AMPULE: 2.5; .5 SOLUTION RESPIRATORY (INHALATION) at 20:12

## 2019-01-17 RX ADMIN — ALPRAZOLAM 1 MG: 0.5 TABLET ORAL at 13:35

## 2019-01-17 RX ADMIN — OXYCODONE HYDROCHLORIDE 15 MG: 5 TABLET ORAL at 12:32

## 2019-01-17 RX ADMIN — MONTELUKAST SODIUM 10 MG: 10 TABLET, FILM COATED ORAL at 20:55

## 2019-01-17 RX ADMIN — FAMOTIDINE 20 MG: 20 TABLET ORAL at 20:55

## 2019-01-17 RX ADMIN — METHYLPREDNISOLONE SODIUM SUCCINATE 60 MG: 125 INJECTION, POWDER, FOR SOLUTION INTRAMUSCULAR; INTRAVENOUS at 06:29

## 2019-01-17 RX ADMIN — METHYLPREDNISOLONE SODIUM SUCCINATE 60 MG: 125 INJECTION, POWDER, FOR SOLUTION INTRAMUSCULAR; INTRAVENOUS at 00:34

## 2019-01-17 RX ADMIN — Medication 10 ML: at 20:55

## 2019-01-17 RX ADMIN — ALPRAZOLAM 1 MG: 0.5 TABLET ORAL at 08:27

## 2019-01-17 RX ADMIN — METHADONE HYDROCHLORIDE 20 MG: 10 TABLET ORAL at 16:56

## 2019-01-17 RX ADMIN — FAMOTIDINE 20 MG: 20 TABLET ORAL at 08:27

## 2019-01-17 RX ADMIN — BUDESONIDE 500 MCG: 0.5 SUSPENSION RESPIRATORY (INHALATION) at 20:12

## 2019-01-17 RX ADMIN — OXYCODONE HYDROCHLORIDE 15 MG: 5 TABLET ORAL at 03:40

## 2019-01-17 RX ADMIN — AZITHROMYCIN MONOHYDRATE 500 MG: 500 INJECTION, POWDER, LYOPHILIZED, FOR SOLUTION INTRAVENOUS at 20:55

## 2019-01-17 RX ADMIN — FORMOTEROL FUMARATE DIHYDRATE 20 MCG: 20 SOLUTION RESPIRATORY (INHALATION) at 20:12

## 2019-01-17 RX ADMIN — METHYLPREDNISOLONE SODIUM SUCCINATE 60 MG: 125 INJECTION, POWDER, FOR SOLUTION INTRAMUSCULAR; INTRAVENOUS at 22:16

## 2019-01-17 RX ADMIN — OXYCODONE HYDROCHLORIDE 15 MG: 5 TABLET ORAL at 16:41

## 2019-01-17 ASSESSMENT — PAIN SCALES - GENERAL
PAINLEVEL_OUTOF10: 7
PAINLEVEL_OUTOF10: 5
PAINLEVEL_OUTOF10: 4
PAINLEVEL_OUTOF10: 7
PAINLEVEL_OUTOF10: 8
PAINLEVEL_OUTOF10: 8
PAINLEVEL_OUTOF10: 6
PAINLEVEL_OUTOF10: 8
PAINLEVEL_OUTOF10: 7
PAINLEVEL_OUTOF10: 4
PAINLEVEL_OUTOF10: 7
PAINLEVEL_OUTOF10: 9
PAINLEVEL_OUTOF10: 6
PAINLEVEL_OUTOF10: 5
PAINLEVEL_OUTOF10: 8
PAINLEVEL_OUTOF10: 5

## 2019-01-17 ASSESSMENT — PAIN DESCRIPTION - PROGRESSION
CLINICAL_PROGRESSION: NOT CHANGED
CLINICAL_PROGRESSION: GRADUALLY WORSENING

## 2019-01-17 ASSESSMENT — PAIN DESCRIPTION - DESCRIPTORS
DESCRIPTORS: ACHING

## 2019-01-17 ASSESSMENT — PAIN - FUNCTIONAL ASSESSMENT
PAIN_FUNCTIONAL_ASSESSMENT: PREVENTS OR INTERFERES SOME ACTIVE ACTIVITIES AND ADLS
PAIN_FUNCTIONAL_ASSESSMENT: PREVENTS OR INTERFERES SOME ACTIVE ACTIVITIES AND ADLS
PAIN_FUNCTIONAL_ASSESSMENT: 0-10

## 2019-01-17 ASSESSMENT — PAIN DESCRIPTION - LOCATION
LOCATION: KNEE
LOCATION: GENERALIZED
LOCATION: KNEE
LOCATION: GENERALIZED
LOCATION: GENERALIZED

## 2019-01-17 ASSESSMENT — PAIN DESCRIPTION - ONSET
ONSET: GRADUAL
ONSET: ON-GOING

## 2019-01-17 ASSESSMENT — PAIN DESCRIPTION - PAIN TYPE
TYPE: CHRONIC PAIN

## 2019-01-17 ASSESSMENT — PAIN DESCRIPTION - ORIENTATION
ORIENTATION: RIGHT;LEFT
ORIENTATION: RIGHT;LEFT

## 2019-01-18 LAB
ANION GAP SERPL CALCULATED.3IONS-SCNC: 14 MMOL/L (ref 3–16)
BASOPHILS ABSOLUTE: 0 K/UL (ref 0–0.2)
BASOPHILS RELATIVE PERCENT: 0.2 %
BUN BLDV-MCNC: 19 MG/DL (ref 7–20)
CALCIUM SERPL-MCNC: 9.4 MG/DL (ref 8.3–10.6)
CHLORIDE BLD-SCNC: 96 MMOL/L (ref 99–110)
CO2: 28 MMOL/L (ref 21–32)
CREAT SERPL-MCNC: 0.7 MG/DL (ref 0.6–1.1)
EOSINOPHILS ABSOLUTE: 0 K/UL (ref 0–0.6)
EOSINOPHILS RELATIVE PERCENT: 0 %
GFR AFRICAN AMERICAN: >60
GFR NON-AFRICAN AMERICAN: >60
GLUCOSE BLD-MCNC: 125 MG/DL (ref 70–99)
HCT VFR BLD CALC: 41.2 % (ref 36–48)
HEMOGLOBIN: 13.7 G/DL (ref 12–16)
LYMPHOCYTES ABSOLUTE: 0.8 K/UL (ref 1–5.1)
LYMPHOCYTES RELATIVE PERCENT: 8.8 %
MCH RBC QN AUTO: 27.9 PG (ref 26–34)
MCHC RBC AUTO-ENTMCNC: 33.2 G/DL (ref 31–36)
MCV RBC AUTO: 84 FL (ref 80–100)
MONOCYTES ABSOLUTE: 0.3 K/UL (ref 0–1.3)
MONOCYTES RELATIVE PERCENT: 3.8 %
NEUTROPHILS ABSOLUTE: 7.5 K/UL (ref 1.7–7.7)
NEUTROPHILS RELATIVE PERCENT: 87.2 %
PDW BLD-RTO: 14.7 % (ref 12.4–15.4)
PLATELET # BLD: 281 K/UL (ref 135–450)
PMV BLD AUTO: 7.6 FL (ref 5–10.5)
POTASSIUM REFLEX MAGNESIUM: 4.3 MMOL/L (ref 3.5–5.1)
RBC # BLD: 4.91 M/UL (ref 4–5.2)
SODIUM BLD-SCNC: 138 MMOL/L (ref 136–145)
WBC # BLD: 8.6 K/UL (ref 4–11)

## 2019-01-18 PROCEDURE — 6370000000 HC RX 637 (ALT 250 FOR IP): Performed by: INTERNAL MEDICINE

## 2019-01-18 PROCEDURE — 94761 N-INVAS EAR/PLS OXIMETRY MLT: CPT

## 2019-01-18 PROCEDURE — 94664 DEMO&/EVAL PT USE INHALER: CPT

## 2019-01-18 PROCEDURE — 96376 TX/PRO/DX INJ SAME DRUG ADON: CPT

## 2019-01-18 PROCEDURE — 6360000002 HC RX W HCPCS: Performed by: INTERNAL MEDICINE

## 2019-01-18 PROCEDURE — 85025 COMPLETE CBC W/AUTO DIFF WBC: CPT

## 2019-01-18 PROCEDURE — 94640 AIRWAY INHALATION TREATMENT: CPT

## 2019-01-18 PROCEDURE — 80048 BASIC METABOLIC PNL TOTAL CA: CPT

## 2019-01-18 PROCEDURE — 2580000003 HC RX 258: Performed by: INTERNAL MEDICINE

## 2019-01-18 PROCEDURE — 96366 THER/PROPH/DIAG IV INF ADDON: CPT

## 2019-01-18 PROCEDURE — 2060000000 HC ICU INTERMEDIATE R&B

## 2019-01-18 PROCEDURE — 99232 SBSQ HOSP IP/OBS MODERATE 35: CPT | Performed by: INTERNAL MEDICINE

## 2019-01-18 RX ORDER — METHYLPREDNISOLONE SODIUM SUCCINATE 40 MG/ML
40 INJECTION, POWDER, LYOPHILIZED, FOR SOLUTION INTRAMUSCULAR; INTRAVENOUS EVERY 8 HOURS
Status: DISCONTINUED | OUTPATIENT
Start: 2019-01-18 | End: 2019-01-21

## 2019-01-18 RX ADMIN — FLUTICASONE PROPIONATE 1 SPRAY: 50 SPRAY, METERED NASAL at 20:26

## 2019-01-18 RX ADMIN — Medication 10 ML: at 08:43

## 2019-01-18 RX ADMIN — BUDESONIDE 500 MCG: 0.5 SUSPENSION RESPIRATORY (INHALATION) at 20:52

## 2019-01-18 RX ADMIN — METHADONE HYDROCHLORIDE 30 MG: 10 TABLET ORAL at 04:42

## 2019-01-18 RX ADMIN — ALPRAZOLAM 1 MG: 0.5 TABLET ORAL at 19:53

## 2019-01-18 RX ADMIN — BUDESONIDE 500 MCG: 0.5 SUSPENSION RESPIRATORY (INHALATION) at 07:50

## 2019-01-18 RX ADMIN — METHADONE HYDROCHLORIDE 20 MG: 10 TABLET ORAL at 16:55

## 2019-01-18 RX ADMIN — AZITHROMYCIN MONOHYDRATE 500 MG: 500 INJECTION, POWDER, LYOPHILIZED, FOR SOLUTION INTRAVENOUS at 20:20

## 2019-01-18 RX ADMIN — FORMOTEROL FUMARATE DIHYDRATE 20 MCG: 20 SOLUTION RESPIRATORY (INHALATION) at 20:51

## 2019-01-18 RX ADMIN — OXYCODONE HYDROCHLORIDE 15 MG: 5 TABLET ORAL at 01:21

## 2019-01-18 RX ADMIN — IPRATROPIUM BROMIDE AND ALBUTEROL SULFATE 1 AMPULE: 2.5; .5 SOLUTION RESPIRATORY (INHALATION) at 15:50

## 2019-01-18 RX ADMIN — FAMOTIDINE 20 MG: 20 TABLET ORAL at 20:20

## 2019-01-18 RX ADMIN — FAMOTIDINE 20 MG: 20 TABLET ORAL at 08:41

## 2019-01-18 RX ADMIN — IPRATROPIUM BROMIDE AND ALBUTEROL SULFATE 1 AMPULE: 2.5; .5 SOLUTION RESPIRATORY (INHALATION) at 07:40

## 2019-01-18 RX ADMIN — METHYLPREDNISOLONE SODIUM SUCCINATE 40 MG: 40 INJECTION, POWDER, FOR SOLUTION INTRAMUSCULAR; INTRAVENOUS at 21:35

## 2019-01-18 RX ADMIN — Medication 10 ML: at 20:21

## 2019-01-18 RX ADMIN — ALPRAZOLAM 1 MG: 0.5 TABLET ORAL at 08:41

## 2019-01-18 RX ADMIN — IPRATROPIUM BROMIDE AND ALBUTEROL SULFATE 1 AMPULE: 2.5; .5 SOLUTION RESPIRATORY (INHALATION) at 20:52

## 2019-01-18 RX ADMIN — METHYLPREDNISOLONE SODIUM SUCCINATE 40 MG: 40 INJECTION, POWDER, FOR SOLUTION INTRAMUSCULAR; INTRAVENOUS at 13:35

## 2019-01-18 RX ADMIN — MONTELUKAST SODIUM 10 MG: 10 TABLET, FILM COATED ORAL at 20:20

## 2019-01-18 RX ADMIN — OXYCODONE HYDROCHLORIDE 15 MG: 5 TABLET ORAL at 05:59

## 2019-01-18 RX ADMIN — IPRATROPIUM BROMIDE AND ALBUTEROL SULFATE 1 AMPULE: 2.5; .5 SOLUTION RESPIRATORY (INHALATION) at 11:43

## 2019-01-18 RX ADMIN — FORMOTEROL FUMARATE DIHYDRATE 20 MCG: 20 SOLUTION RESPIRATORY (INHALATION) at 07:57

## 2019-01-18 RX ADMIN — OXYCODONE HYDROCHLORIDE 15 MG: 5 TABLET ORAL at 15:14

## 2019-01-18 RX ADMIN — OXYCODONE HYDROCHLORIDE 15 MG: 5 TABLET ORAL at 19:52

## 2019-01-18 RX ADMIN — ALPRAZOLAM 1 MG: 0.5 TABLET ORAL at 04:42

## 2019-01-18 RX ADMIN — ZOLPIDEM TARTRATE 5 MG: 5 TABLET ORAL at 21:35

## 2019-01-18 RX ADMIN — METHYLPREDNISOLONE SODIUM SUCCINATE 60 MG: 125 INJECTION, POWDER, FOR SOLUTION INTRAMUSCULAR; INTRAVENOUS at 05:59

## 2019-01-18 RX ADMIN — ALPRAZOLAM 1 MG: 0.5 TABLET ORAL at 13:35

## 2019-01-18 RX ADMIN — OXYCODONE HYDROCHLORIDE 15 MG: 5 TABLET ORAL at 10:52

## 2019-01-18 RX ADMIN — THEOPHYLLINE 400 MG: 400 TABLET, EXTENDED RELEASE ORAL at 08:41

## 2019-01-18 ASSESSMENT — PAIN DESCRIPTION - LOCATION
LOCATION: KNEE;LEG
LOCATION: LEG;KNEE
LOCATION: LEG;KNEE
LOCATION: KNEE;LEG
LOCATION: GENERALIZED;KNEE;LEG
LOCATION: GENERALIZED;KNEE;LEG

## 2019-01-18 ASSESSMENT — PAIN DESCRIPTION - ORIENTATION
ORIENTATION: RIGHT;LEFT
ORIENTATION: LEFT;RIGHT
ORIENTATION: RIGHT;LEFT

## 2019-01-18 ASSESSMENT — PAIN DESCRIPTION - DESCRIPTORS
DESCRIPTORS: ACHING

## 2019-01-18 ASSESSMENT — PAIN SCALES - GENERAL
PAINLEVEL_OUTOF10: 8
PAINLEVEL_OUTOF10: 4
PAINLEVEL_OUTOF10: 0
PAINLEVEL_OUTOF10: 7
PAINLEVEL_OUTOF10: 7
PAINLEVEL_OUTOF10: 4
PAINLEVEL_OUTOF10: 8
PAINLEVEL_OUTOF10: 2
PAINLEVEL_OUTOF10: 6
PAINLEVEL_OUTOF10: 5
PAINLEVEL_OUTOF10: 4
PAINLEVEL_OUTOF10: 6
PAINLEVEL_OUTOF10: 5
PAINLEVEL_OUTOF10: 8

## 2019-01-18 ASSESSMENT — PAIN DESCRIPTION - PAIN TYPE
TYPE: CHRONIC PAIN

## 2019-01-18 ASSESSMENT — PAIN - FUNCTIONAL ASSESSMENT: PAIN_FUNCTIONAL_ASSESSMENT: PREVENTS OR INTERFERES SOME ACTIVE ACTIVITIES AND ADLS

## 2019-01-18 ASSESSMENT — PAIN DESCRIPTION - PROGRESSION: CLINICAL_PROGRESSION: GRADUALLY WORSENING

## 2019-01-18 ASSESSMENT — PAIN DESCRIPTION - ONSET
ONSET: ON-GOING

## 2019-01-19 LAB
ANION GAP SERPL CALCULATED.3IONS-SCNC: 13 MMOL/L (ref 3–16)
BASOPHILS ABSOLUTE: 0 K/UL (ref 0–0.2)
BASOPHILS RELATIVE PERCENT: 0.1 %
BUN BLDV-MCNC: 20 MG/DL (ref 7–20)
CALCIUM SERPL-MCNC: 9.2 MG/DL (ref 8.3–10.6)
CHLORIDE BLD-SCNC: 97 MMOL/L (ref 99–110)
CO2: 30 MMOL/L (ref 21–32)
CREAT SERPL-MCNC: 0.7 MG/DL (ref 0.6–1.1)
EOSINOPHILS ABSOLUTE: 0 K/UL (ref 0–0.6)
EOSINOPHILS RELATIVE PERCENT: 0 %
GFR AFRICAN AMERICAN: >60
GFR NON-AFRICAN AMERICAN: >60
GLUCOSE BLD-MCNC: 123 MG/DL (ref 70–99)
HCT VFR BLD CALC: 40.1 % (ref 36–48)
HEMOGLOBIN: 13.3 G/DL (ref 12–16)
LYMPHOCYTES ABSOLUTE: 0.8 K/UL (ref 1–5.1)
LYMPHOCYTES RELATIVE PERCENT: 10.9 %
MCH RBC QN AUTO: 27.7 PG (ref 26–34)
MCHC RBC AUTO-ENTMCNC: 33.2 G/DL (ref 31–36)
MCV RBC AUTO: 83.2 FL (ref 80–100)
MONOCYTES ABSOLUTE: 0.4 K/UL (ref 0–1.3)
MONOCYTES RELATIVE PERCENT: 6.2 %
NEUTROPHILS ABSOLUTE: 6 K/UL (ref 1.7–7.7)
NEUTROPHILS RELATIVE PERCENT: 82.8 %
PDW BLD-RTO: 14.8 % (ref 12.4–15.4)
PLATELET # BLD: 259 K/UL (ref 135–450)
PMV BLD AUTO: 7.6 FL (ref 5–10.5)
POTASSIUM REFLEX MAGNESIUM: 4.2 MMOL/L (ref 3.5–5.1)
RBC # BLD: 4.82 M/UL (ref 4–5.2)
SODIUM BLD-SCNC: 140 MMOL/L (ref 136–145)
WBC # BLD: 7.2 K/UL (ref 4–11)

## 2019-01-19 PROCEDURE — 99232 SBSQ HOSP IP/OBS MODERATE 35: CPT | Performed by: NURSE PRACTITIONER

## 2019-01-19 PROCEDURE — 94664 DEMO&/EVAL PT USE INHALER: CPT

## 2019-01-19 PROCEDURE — 6360000002 HC RX W HCPCS: Performed by: INTERNAL MEDICINE

## 2019-01-19 PROCEDURE — 94761 N-INVAS EAR/PLS OXIMETRY MLT: CPT

## 2019-01-19 PROCEDURE — 6370000000 HC RX 637 (ALT 250 FOR IP): Performed by: INTERNAL MEDICINE

## 2019-01-19 PROCEDURE — 36592 COLLECT BLOOD FROM PICC: CPT

## 2019-01-19 PROCEDURE — 94760 N-INVAS EAR/PLS OXIMETRY 1: CPT

## 2019-01-19 PROCEDURE — 94640 AIRWAY INHALATION TREATMENT: CPT

## 2019-01-19 PROCEDURE — 96376 TX/PRO/DX INJ SAME DRUG ADON: CPT

## 2019-01-19 PROCEDURE — 80048 BASIC METABOLIC PNL TOTAL CA: CPT

## 2019-01-19 PROCEDURE — 2060000000 HC ICU INTERMEDIATE R&B

## 2019-01-19 PROCEDURE — 2580000003 HC RX 258: Performed by: INTERNAL MEDICINE

## 2019-01-19 PROCEDURE — 85025 COMPLETE CBC W/AUTO DIFF WBC: CPT

## 2019-01-19 RX ORDER — AZITHROMYCIN 500 MG/1
500 TABLET, FILM COATED ORAL EVERY 24 HOURS
Status: DISCONTINUED | OUTPATIENT
Start: 2019-01-19 | End: 2019-01-22 | Stop reason: HOSPADM

## 2019-01-19 RX ADMIN — FAMOTIDINE 20 MG: 20 TABLET ORAL at 08:11

## 2019-01-19 RX ADMIN — METHYLPREDNISOLONE SODIUM SUCCINATE 40 MG: 40 INJECTION, POWDER, FOR SOLUTION INTRAMUSCULAR; INTRAVENOUS at 22:25

## 2019-01-19 RX ADMIN — OXYCODONE HYDROCHLORIDE 15 MG: 5 TABLET ORAL at 13:42

## 2019-01-19 RX ADMIN — Medication 10 ML: at 20:17

## 2019-01-19 RX ADMIN — MONTELUKAST SODIUM 10 MG: 10 TABLET, FILM COATED ORAL at 20:17

## 2019-01-19 RX ADMIN — METHYLPREDNISOLONE SODIUM SUCCINATE 40 MG: 40 INJECTION, POWDER, FOR SOLUTION INTRAMUSCULAR; INTRAVENOUS at 05:25

## 2019-01-19 RX ADMIN — AZITHROMYCIN 500 MG: 500 TABLET, FILM COATED ORAL at 16:40

## 2019-01-19 RX ADMIN — BUDESONIDE 500 MCG: 0.5 SUSPENSION RESPIRATORY (INHALATION) at 19:39

## 2019-01-19 RX ADMIN — IPRATROPIUM BROMIDE AND ALBUTEROL SULFATE 1 AMPULE: 2.5; .5 SOLUTION RESPIRATORY (INHALATION) at 16:30

## 2019-01-19 RX ADMIN — OXYCODONE HYDROCHLORIDE 15 MG: 5 TABLET ORAL at 22:25

## 2019-01-19 RX ADMIN — FORMOTEROL FUMARATE DIHYDRATE 20 MCG: 20 SOLUTION RESPIRATORY (INHALATION) at 08:14

## 2019-01-19 RX ADMIN — GUAIFENESIN AND DEXTROMETHORPHAN 10 ML: 100; 10 SYRUP ORAL at 08:11

## 2019-01-19 RX ADMIN — IPRATROPIUM BROMIDE AND ALBUTEROL SULFATE 1 AMPULE: 2.5; .5 SOLUTION RESPIRATORY (INHALATION) at 08:14

## 2019-01-19 RX ADMIN — THEOPHYLLINE 400 MG: 400 TABLET, EXTENDED RELEASE ORAL at 08:11

## 2019-01-19 RX ADMIN — ALPRAZOLAM 1 MG: 0.5 TABLET ORAL at 13:42

## 2019-01-19 RX ADMIN — FAMOTIDINE 20 MG: 20 TABLET ORAL at 20:17

## 2019-01-19 RX ADMIN — IPRATROPIUM BROMIDE AND ALBUTEROL SULFATE 1 AMPULE: 2.5; .5 SOLUTION RESPIRATORY (INHALATION) at 11:59

## 2019-01-19 RX ADMIN — OXYCODONE HYDROCHLORIDE 15 MG: 5 TABLET ORAL at 01:24

## 2019-01-19 RX ADMIN — Medication 10 ML: at 08:12

## 2019-01-19 RX ADMIN — ALPRAZOLAM 1 MG: 0.5 TABLET ORAL at 04:30

## 2019-01-19 RX ADMIN — FORMOTEROL FUMARATE DIHYDRATE 20 MCG: 20 SOLUTION RESPIRATORY (INHALATION) at 19:39

## 2019-01-19 RX ADMIN — OXYCODONE HYDROCHLORIDE 15 MG: 5 TABLET ORAL at 05:43

## 2019-01-19 RX ADMIN — FLUTICASONE PROPIONATE 1 SPRAY: 50 SPRAY, METERED NASAL at 20:19

## 2019-01-19 RX ADMIN — BUDESONIDE 500 MCG: 0.5 SUSPENSION RESPIRATORY (INHALATION) at 08:14

## 2019-01-19 RX ADMIN — IPRATROPIUM BROMIDE AND ALBUTEROL SULFATE 1 AMPULE: 2.5; .5 SOLUTION RESPIRATORY (INHALATION) at 19:39

## 2019-01-19 RX ADMIN — METHYLPREDNISOLONE SODIUM SUCCINATE 40 MG: 40 INJECTION, POWDER, FOR SOLUTION INTRAMUSCULAR; INTRAVENOUS at 13:42

## 2019-01-19 RX ADMIN — OXYCODONE HYDROCHLORIDE 15 MG: 5 TABLET ORAL at 18:28

## 2019-01-19 RX ADMIN — ZOLPIDEM TARTRATE 5 MG: 5 TABLET ORAL at 22:32

## 2019-01-19 RX ADMIN — METHADONE HYDROCHLORIDE 30 MG: 10 TABLET ORAL at 04:30

## 2019-01-19 RX ADMIN — OXYCODONE HYDROCHLORIDE 15 MG: 5 TABLET ORAL at 09:52

## 2019-01-19 RX ADMIN — ALPRAZOLAM 1 MG: 0.5 TABLET ORAL at 20:17

## 2019-01-19 RX ADMIN — METHADONE HYDROCHLORIDE 20 MG: 10 TABLET ORAL at 16:40

## 2019-01-19 RX ADMIN — ALPRAZOLAM 1 MG: 0.5 TABLET ORAL at 08:11

## 2019-01-19 ASSESSMENT — PAIN SCALES - GENERAL
PAINLEVEL_OUTOF10: 8
PAINLEVEL_OUTOF10: 7
PAINLEVEL_OUTOF10: 0
PAINLEVEL_OUTOF10: 6
PAINLEVEL_OUTOF10: 9
PAINLEVEL_OUTOF10: 5
PAINLEVEL_OUTOF10: 4
PAINLEVEL_OUTOF10: 0
PAINLEVEL_OUTOF10: 4
PAINLEVEL_OUTOF10: 6
PAINLEVEL_OUTOF10: 0
PAINLEVEL_OUTOF10: 5

## 2019-01-19 ASSESSMENT — PAIN DESCRIPTION - PAIN TYPE
TYPE: CHRONIC PAIN

## 2019-01-19 ASSESSMENT — PAIN DESCRIPTION - ONSET
ONSET: ON-GOING

## 2019-01-19 ASSESSMENT — PAIN DESCRIPTION - DESCRIPTORS
DESCRIPTORS: ACHING;CRAMPING
DESCRIPTORS: CRAMPING;ACHING
DESCRIPTORS: ACHING;CRAMPING
DESCRIPTORS: ACHING

## 2019-01-19 ASSESSMENT — PAIN DESCRIPTION - LOCATION
LOCATION: LEG;KNEE
LOCATION: KNEE;LEG
LOCATION: KNEE
LOCATION: KNEE;LEG
LOCATION: KNEE
LOCATION: KNEE;LEG
LOCATION: LEG;KNEE
LOCATION: LEG;KNEE

## 2019-01-19 ASSESSMENT — PAIN DESCRIPTION - ORIENTATION
ORIENTATION: RIGHT;LEFT

## 2019-01-20 LAB
ANION GAP SERPL CALCULATED.3IONS-SCNC: 12 MMOL/L (ref 3–16)
BASOPHILS ABSOLUTE: 0 K/UL (ref 0–0.2)
BASOPHILS RELATIVE PERCENT: 0.3 %
BLOOD CULTURE, ROUTINE: NORMAL
BUN BLDV-MCNC: 24 MG/DL (ref 7–20)
CALCIUM SERPL-MCNC: 8.8 MG/DL (ref 8.3–10.6)
CHLORIDE BLD-SCNC: 101 MMOL/L (ref 99–110)
CO2: 29 MMOL/L (ref 21–32)
CREAT SERPL-MCNC: 0.8 MG/DL (ref 0.6–1.1)
CULTURE, BLOOD 2: NORMAL
EOSINOPHILS ABSOLUTE: 0 K/UL (ref 0–0.6)
EOSINOPHILS RELATIVE PERCENT: 0 %
GFR AFRICAN AMERICAN: >60
GFR NON-AFRICAN AMERICAN: >60
GLUCOSE BLD-MCNC: 168 MG/DL (ref 70–99)
HCT VFR BLD CALC: 40.1 % (ref 36–48)
HEMOGLOBIN: 13.2 G/DL (ref 12–16)
LYMPHOCYTES ABSOLUTE: 0.7 K/UL (ref 1–5.1)
LYMPHOCYTES RELATIVE PERCENT: 10.8 %
MCH RBC QN AUTO: 27.3 PG (ref 26–34)
MCHC RBC AUTO-ENTMCNC: 32.9 G/DL (ref 31–36)
MCV RBC AUTO: 83.1 FL (ref 80–100)
MONOCYTES ABSOLUTE: 0.3 K/UL (ref 0–1.3)
MONOCYTES RELATIVE PERCENT: 5.1 %
NEUTROPHILS ABSOLUTE: 5.2 K/UL (ref 1.7–7.7)
NEUTROPHILS RELATIVE PERCENT: 83.8 %
PDW BLD-RTO: 15 % (ref 12.4–15.4)
PLATELET # BLD: 227 K/UL (ref 135–450)
PMV BLD AUTO: 7.5 FL (ref 5–10.5)
POTASSIUM REFLEX MAGNESIUM: 4.3 MMOL/L (ref 3.5–5.1)
RBC # BLD: 4.82 M/UL (ref 4–5.2)
SODIUM BLD-SCNC: 142 MMOL/L (ref 136–145)
WBC # BLD: 6.2 K/UL (ref 4–11)

## 2019-01-20 PROCEDURE — 94760 N-INVAS EAR/PLS OXIMETRY 1: CPT

## 2019-01-20 PROCEDURE — 6370000000 HC RX 637 (ALT 250 FOR IP): Performed by: INTERNAL MEDICINE

## 2019-01-20 PROCEDURE — 2060000000 HC ICU INTERMEDIATE R&B

## 2019-01-20 PROCEDURE — 94640 AIRWAY INHALATION TREATMENT: CPT

## 2019-01-20 PROCEDURE — 6360000002 HC RX W HCPCS: Performed by: INTERNAL MEDICINE

## 2019-01-20 PROCEDURE — 96376 TX/PRO/DX INJ SAME DRUG ADON: CPT

## 2019-01-20 PROCEDURE — 2580000003 HC RX 258: Performed by: INTERNAL MEDICINE

## 2019-01-20 PROCEDURE — 94664 DEMO&/EVAL PT USE INHALER: CPT

## 2019-01-20 PROCEDURE — 80048 BASIC METABOLIC PNL TOTAL CA: CPT

## 2019-01-20 PROCEDURE — 36592 COLLECT BLOOD FROM PICC: CPT

## 2019-01-20 PROCEDURE — 85025 COMPLETE CBC W/AUTO DIFF WBC: CPT

## 2019-01-20 PROCEDURE — 94761 N-INVAS EAR/PLS OXIMETRY MLT: CPT

## 2019-01-20 RX ORDER — LOSARTAN POTASSIUM 50 MG/1
50 TABLET ORAL DAILY
Status: DISCONTINUED | OUTPATIENT
Start: 2019-01-20 | End: 2019-01-22 | Stop reason: HOSPADM

## 2019-01-20 RX ADMIN — OXYCODONE HYDROCHLORIDE 15 MG: 5 TABLET ORAL at 11:10

## 2019-01-20 RX ADMIN — FORMOTEROL FUMARATE DIHYDRATE 20 MCG: 20 SOLUTION RESPIRATORY (INHALATION) at 19:52

## 2019-01-20 RX ADMIN — METHADONE HYDROCHLORIDE 30 MG: 10 TABLET ORAL at 04:11

## 2019-01-20 RX ADMIN — ZOLPIDEM TARTRATE 5 MG: 5 TABLET ORAL at 22:09

## 2019-01-20 RX ADMIN — LOSARTAN POTASSIUM 50 MG: 50 TABLET ORAL at 12:21

## 2019-01-20 RX ADMIN — Medication 10 ML: at 08:10

## 2019-01-20 RX ADMIN — FAMOTIDINE 20 MG: 20 TABLET ORAL at 08:09

## 2019-01-20 RX ADMIN — ALPRAZOLAM 1 MG: 0.5 TABLET ORAL at 08:09

## 2019-01-20 RX ADMIN — THEOPHYLLINE 400 MG: 400 TABLET, EXTENDED RELEASE ORAL at 08:09

## 2019-01-20 RX ADMIN — IPRATROPIUM BROMIDE AND ALBUTEROL SULFATE 1 AMPULE: 2.5; .5 SOLUTION RESPIRATORY (INHALATION) at 08:02

## 2019-01-20 RX ADMIN — METHADONE HYDROCHLORIDE 20 MG: 10 TABLET ORAL at 16:27

## 2019-01-20 RX ADMIN — IPRATROPIUM BROMIDE AND ALBUTEROL SULFATE 1 AMPULE: 2.5; .5 SOLUTION RESPIRATORY (INHALATION) at 12:01

## 2019-01-20 RX ADMIN — BUDESONIDE 500 MCG: 0.5 SUSPENSION RESPIRATORY (INHALATION) at 08:02

## 2019-01-20 RX ADMIN — ALPRAZOLAM 1 MG: 0.5 TABLET ORAL at 13:43

## 2019-01-20 RX ADMIN — FORMOTEROL FUMARATE DIHYDRATE 20 MCG: 20 SOLUTION RESPIRATORY (INHALATION) at 08:02

## 2019-01-20 RX ADMIN — METHYLPREDNISOLONE SODIUM SUCCINATE 40 MG: 40 INJECTION, POWDER, FOR SOLUTION INTRAMUSCULAR; INTRAVENOUS at 06:11

## 2019-01-20 RX ADMIN — ALPRAZOLAM 1 MG: 0.5 TABLET ORAL at 20:20

## 2019-01-20 RX ADMIN — OXYCODONE HYDROCHLORIDE 15 MG: 5 TABLET ORAL at 19:26

## 2019-01-20 RX ADMIN — Medication 10 ML: at 20:20

## 2019-01-20 RX ADMIN — FLUTICASONE PROPIONATE 1 SPRAY: 50 SPRAY, METERED NASAL at 20:20

## 2019-01-20 RX ADMIN — AZITHROMYCIN 500 MG: 500 TABLET, FILM COATED ORAL at 17:34

## 2019-01-20 RX ADMIN — BUDESONIDE 500 MCG: 0.5 SUSPENSION RESPIRATORY (INHALATION) at 19:52

## 2019-01-20 RX ADMIN — OXYCODONE HYDROCHLORIDE 15 MG: 5 TABLET ORAL at 15:10

## 2019-01-20 RX ADMIN — FAMOTIDINE 20 MG: 20 TABLET ORAL at 20:20

## 2019-01-20 RX ADMIN — IPRATROPIUM BROMIDE AND ALBUTEROL SULFATE 1 AMPULE: 2.5; .5 SOLUTION RESPIRATORY (INHALATION) at 19:52

## 2019-01-20 RX ADMIN — OXYCODONE HYDROCHLORIDE 15 MG: 5 TABLET ORAL at 07:01

## 2019-01-20 RX ADMIN — ALPRAZOLAM 1 MG: 0.5 TABLET ORAL at 04:11

## 2019-01-20 RX ADMIN — MONTELUKAST SODIUM 10 MG: 10 TABLET, FILM COATED ORAL at 20:20

## 2019-01-20 RX ADMIN — METHYLPREDNISOLONE SODIUM SUCCINATE 40 MG: 40 INJECTION, POWDER, FOR SOLUTION INTRAMUSCULAR; INTRAVENOUS at 13:43

## 2019-01-20 RX ADMIN — METHYLPREDNISOLONE SODIUM SUCCINATE 40 MG: 40 INJECTION, POWDER, FOR SOLUTION INTRAMUSCULAR; INTRAVENOUS at 22:09

## 2019-01-20 RX ADMIN — OXYCODONE HYDROCHLORIDE 15 MG: 5 TABLET ORAL at 02:51

## 2019-01-20 RX ADMIN — IPRATROPIUM BROMIDE AND ALBUTEROL SULFATE 1 AMPULE: 2.5; .5 SOLUTION RESPIRATORY (INHALATION) at 15:20

## 2019-01-20 ASSESSMENT — PAIN SCALES - GENERAL
PAINLEVEL_OUTOF10: 6
PAINLEVEL_OUTOF10: 7
PAINLEVEL_OUTOF10: 5
PAINLEVEL_OUTOF10: 4
PAINLEVEL_OUTOF10: 7
PAINLEVEL_OUTOF10: 6
PAINLEVEL_OUTOF10: 5
PAINLEVEL_OUTOF10: 7
PAINLEVEL_OUTOF10: 8
PAINLEVEL_OUTOF10: 0
PAINLEVEL_OUTOF10: 6

## 2019-01-20 ASSESSMENT — PAIN DESCRIPTION - PAIN TYPE
TYPE: CHRONIC PAIN

## 2019-01-20 ASSESSMENT — PAIN DESCRIPTION - ONSET
ONSET: ON-GOING

## 2019-01-20 ASSESSMENT — PAIN DESCRIPTION - LOCATION
LOCATION: KNEE;LEG
LOCATION: KNEE
LOCATION: LEG;KNEE
LOCATION: KNEE;LEG
LOCATION: KNEE;LEG
LOCATION: LEG;KNEE
LOCATION: LEG;KNEE

## 2019-01-20 ASSESSMENT — PAIN DESCRIPTION - PROGRESSION: CLINICAL_PROGRESSION: NOT CHANGED

## 2019-01-20 ASSESSMENT — PAIN DESCRIPTION - ORIENTATION
ORIENTATION: RIGHT;LEFT

## 2019-01-20 ASSESSMENT — PAIN DESCRIPTION - DESCRIPTORS
DESCRIPTORS: ACHING

## 2019-01-21 ENCOUNTER — TELEPHONE (OUTPATIENT)
Dept: PULMONOLOGY | Age: 49
End: 2019-01-21

## 2019-01-21 LAB
ANION GAP SERPL CALCULATED.3IONS-SCNC: 13 MMOL/L (ref 3–16)
BASOPHILS ABSOLUTE: 0 K/UL (ref 0–0.2)
BASOPHILS RELATIVE PERCENT: 0.2 %
BUN BLDV-MCNC: 28 MG/DL (ref 7–20)
CALCIUM SERPL-MCNC: 9.3 MG/DL (ref 8.3–10.6)
CHLORIDE BLD-SCNC: 96 MMOL/L (ref 99–110)
CO2: 29 MMOL/L (ref 21–32)
CREAT SERPL-MCNC: 0.8 MG/DL (ref 0.6–1.1)
EOSINOPHILS ABSOLUTE: 0 K/UL (ref 0–0.6)
EOSINOPHILS RELATIVE PERCENT: 0.1 %
GFR AFRICAN AMERICAN: >60
GFR NON-AFRICAN AMERICAN: >60
GLUCOSE BLD-MCNC: 145 MG/DL (ref 70–99)
HCT VFR BLD CALC: 43 % (ref 36–48)
HEMOGLOBIN: 14.2 G/DL (ref 12–16)
LYMPHOCYTES ABSOLUTE: 0.9 K/UL (ref 1–5.1)
LYMPHOCYTES RELATIVE PERCENT: 8.9 %
MCH RBC QN AUTO: 27.8 PG (ref 26–34)
MCHC RBC AUTO-ENTMCNC: 32.9 G/DL (ref 31–36)
MCV RBC AUTO: 84.3 FL (ref 80–100)
MONOCYTES ABSOLUTE: 0.5 K/UL (ref 0–1.3)
MONOCYTES RELATIVE PERCENT: 5 %
NEUTROPHILS ABSOLUTE: 8.9 K/UL (ref 1.7–7.7)
NEUTROPHILS RELATIVE PERCENT: 85.8 %
PDW BLD-RTO: 14.7 % (ref 12.4–15.4)
PLATELET # BLD: 315 K/UL (ref 135–450)
PMV BLD AUTO: 7.6 FL (ref 5–10.5)
POTASSIUM REFLEX MAGNESIUM: 4.2 MMOL/L (ref 3.5–5.1)
RBC # BLD: 5.1 M/UL (ref 4–5.2)
SODIUM BLD-SCNC: 138 MMOL/L (ref 136–145)
WBC # BLD: 10.3 K/UL (ref 4–11)

## 2019-01-21 PROCEDURE — 6370000000 HC RX 637 (ALT 250 FOR IP): Performed by: INTERNAL MEDICINE

## 2019-01-21 PROCEDURE — 96376 TX/PRO/DX INJ SAME DRUG ADON: CPT

## 2019-01-21 PROCEDURE — 6360000002 HC RX W HCPCS: Performed by: FAMILY MEDICINE

## 2019-01-21 PROCEDURE — 94760 N-INVAS EAR/PLS OXIMETRY 1: CPT

## 2019-01-21 PROCEDURE — 6360000002 HC RX W HCPCS: Performed by: INTERNAL MEDICINE

## 2019-01-21 PROCEDURE — 80048 BASIC METABOLIC PNL TOTAL CA: CPT

## 2019-01-21 PROCEDURE — 99232 SBSQ HOSP IP/OBS MODERATE 35: CPT | Performed by: INTERNAL MEDICINE

## 2019-01-21 PROCEDURE — 94664 DEMO&/EVAL PT USE INHALER: CPT

## 2019-01-21 PROCEDURE — 94761 N-INVAS EAR/PLS OXIMETRY MLT: CPT

## 2019-01-21 PROCEDURE — 85025 COMPLETE CBC W/AUTO DIFF WBC: CPT

## 2019-01-21 PROCEDURE — 94640 AIRWAY INHALATION TREATMENT: CPT

## 2019-01-21 PROCEDURE — 2060000000 HC ICU INTERMEDIATE R&B

## 2019-01-21 PROCEDURE — 2580000003 HC RX 258: Performed by: INTERNAL MEDICINE

## 2019-01-21 RX ORDER — PREDNISONE 1 MG/1
5 TABLET ORAL DAILY
Status: DISCONTINUED | OUTPATIENT
Start: 2019-01-30 | End: 2019-01-22 | Stop reason: HOSPADM

## 2019-01-21 RX ORDER — PREDNISONE 10 MG/1
10 TABLET ORAL DAILY
Status: DISCONTINUED | OUTPATIENT
Start: 2019-01-27 | End: 2019-01-22 | Stop reason: HOSPADM

## 2019-01-21 RX ORDER — PREDNISONE 20 MG/1
40 TABLET ORAL DAILY
Status: DISCONTINUED | OUTPATIENT
Start: 2019-01-21 | End: 2019-01-22 | Stop reason: HOSPADM

## 2019-01-21 RX ORDER — PREDNISONE 20 MG/1
20 TABLET ORAL DAILY
Status: DISCONTINUED | OUTPATIENT
Start: 2019-01-24 | End: 2019-01-22 | Stop reason: HOSPADM

## 2019-01-21 RX ORDER — PROMETHAZINE HYDROCHLORIDE 25 MG/1
25 TABLET ORAL EVERY 6 HOURS PRN
Status: DISCONTINUED | OUTPATIENT
Start: 2019-01-21 | End: 2019-01-22 | Stop reason: HOSPADM

## 2019-01-21 RX ADMIN — MONTELUKAST SODIUM 10 MG: 10 TABLET, FILM COATED ORAL at 20:36

## 2019-01-21 RX ADMIN — BUDESONIDE 500 MCG: 0.5 SUSPENSION RESPIRATORY (INHALATION) at 07:38

## 2019-01-21 RX ADMIN — Medication 10 ML: at 15:01

## 2019-01-21 RX ADMIN — OXYCODONE HYDROCHLORIDE 15 MG: 5 TABLET ORAL at 10:05

## 2019-01-21 RX ADMIN — ALPRAZOLAM 1 MG: 0.5 TABLET ORAL at 20:35

## 2019-01-21 RX ADMIN — FAMOTIDINE 20 MG: 20 TABLET ORAL at 08:13

## 2019-01-21 RX ADMIN — FLUTICASONE PROPIONATE 1 SPRAY: 50 SPRAY, METERED NASAL at 20:40

## 2019-01-21 RX ADMIN — IPRATROPIUM BROMIDE AND ALBUTEROL SULFATE 1 AMPULE: 2.5; .5 SOLUTION RESPIRATORY (INHALATION) at 15:40

## 2019-01-21 RX ADMIN — OXYCODONE HYDROCHLORIDE 15 MG: 5 TABLET ORAL at 05:52

## 2019-01-21 RX ADMIN — BUDESONIDE 500 MCG: 0.5 SUSPENSION RESPIRATORY (INHALATION) at 19:24

## 2019-01-21 RX ADMIN — OXYCODONE HYDROCHLORIDE 15 MG: 5 TABLET ORAL at 23:28

## 2019-01-21 RX ADMIN — OXYCODONE HYDROCHLORIDE 15 MG: 5 TABLET ORAL at 01:12

## 2019-01-21 RX ADMIN — FAMOTIDINE 20 MG: 20 TABLET ORAL at 20:36

## 2019-01-21 RX ADMIN — GUAIFENESIN AND DEXTROMETHORPHAN 10 ML: 100; 10 SYRUP ORAL at 04:15

## 2019-01-21 RX ADMIN — IPRATROPIUM BROMIDE AND ALBUTEROL SULFATE 1 AMPULE: 2.5; .5 SOLUTION RESPIRATORY (INHALATION) at 11:25

## 2019-01-21 RX ADMIN — ALPRAZOLAM 1 MG: 0.5 TABLET ORAL at 08:13

## 2019-01-21 RX ADMIN — ZOLPIDEM TARTRATE 5 MG: 5 TABLET ORAL at 23:18

## 2019-01-21 RX ADMIN — LOSARTAN POTASSIUM 50 MG: 50 TABLET ORAL at 08:13

## 2019-01-21 RX ADMIN — AZITHROMYCIN 500 MG: 500 TABLET, FILM COATED ORAL at 17:10

## 2019-01-21 RX ADMIN — PREDNISONE 40 MG: 20 TABLET ORAL at 13:26

## 2019-01-21 RX ADMIN — PROMETHAZINE HYDROCHLORIDE 25 MG: 25 TABLET ORAL at 15:00

## 2019-01-21 RX ADMIN — FORMOTEROL FUMARATE DIHYDRATE 20 MCG: 20 SOLUTION RESPIRATORY (INHALATION) at 07:45

## 2019-01-21 RX ADMIN — FORMOTEROL FUMARATE DIHYDRATE 20 MCG: 20 SOLUTION RESPIRATORY (INHALATION) at 19:24

## 2019-01-21 RX ADMIN — Medication 10 ML: at 20:38

## 2019-01-21 RX ADMIN — METHYLPREDNISOLONE SODIUM SUCCINATE 40 MG: 40 INJECTION, POWDER, FOR SOLUTION INTRAMUSCULAR; INTRAVENOUS at 05:52

## 2019-01-21 RX ADMIN — IPRATROPIUM BROMIDE AND ALBUTEROL SULFATE 1 AMPULE: 2.5; .5 SOLUTION RESPIRATORY (INHALATION) at 07:28

## 2019-01-21 RX ADMIN — THEOPHYLLINE 400 MG: 400 TABLET, EXTENDED RELEASE ORAL at 08:13

## 2019-01-21 RX ADMIN — OXYCODONE HYDROCHLORIDE 15 MG: 5 TABLET ORAL at 19:27

## 2019-01-21 RX ADMIN — OXYCODONE HYDROCHLORIDE 15 MG: 5 TABLET ORAL at 14:02

## 2019-01-21 RX ADMIN — METHADONE HYDROCHLORIDE 20 MG: 10 TABLET ORAL at 17:05

## 2019-01-21 RX ADMIN — METHADONE HYDROCHLORIDE 30 MG: 10 TABLET ORAL at 04:15

## 2019-01-21 RX ADMIN — IPRATROPIUM BROMIDE AND ALBUTEROL SULFATE 1 AMPULE: 2.5; .5 SOLUTION RESPIRATORY (INHALATION) at 19:24

## 2019-01-21 RX ADMIN — ALPRAZOLAM 1 MG: 0.5 TABLET ORAL at 04:15

## 2019-01-21 RX ADMIN — ALPRAZOLAM 1 MG: 0.5 TABLET ORAL at 13:27

## 2019-01-21 ASSESSMENT — PAIN DESCRIPTION - LOCATION
LOCATION: LEG;KNEE;BACK
LOCATION: LEG;KNEE
LOCATION: KNEE;LEG
LOCATION: KNEE
LOCATION: KNEE
LOCATION: LEG;KNEE
LOCATION: LEG;KNEE
LOCATION: KNEE

## 2019-01-21 ASSESSMENT — PAIN DESCRIPTION - ORIENTATION
ORIENTATION: RIGHT;LEFT

## 2019-01-21 ASSESSMENT — PAIN DESCRIPTION - PAIN TYPE
TYPE: CHRONIC PAIN

## 2019-01-21 ASSESSMENT — PAIN SCALES - GENERAL
PAINLEVEL_OUTOF10: 8
PAINLEVEL_OUTOF10: 5
PAINLEVEL_OUTOF10: 4
PAINLEVEL_OUTOF10: 4
PAINLEVEL_OUTOF10: 9
PAINLEVEL_OUTOF10: 5
PAINLEVEL_OUTOF10: 4
PAINLEVEL_OUTOF10: 5
PAINLEVEL_OUTOF10: 8
PAINLEVEL_OUTOF10: 8
PAINLEVEL_OUTOF10: 0
PAINLEVEL_OUTOF10: 5
PAINLEVEL_OUTOF10: 7
PAINLEVEL_OUTOF10: 7
PAINLEVEL_OUTOF10: 8
PAINLEVEL_OUTOF10: 7

## 2019-01-21 ASSESSMENT — PAIN - FUNCTIONAL ASSESSMENT
PAIN_FUNCTIONAL_ASSESSMENT: ACTIVITIES ARE NOT PREVENTED
PAIN_FUNCTIONAL_ASSESSMENT: ACTIVITIES ARE NOT PREVENTED
PAIN_FUNCTIONAL_ASSESSMENT: PREVENTS OR INTERFERES SOME ACTIVE ACTIVITIES AND ADLS
PAIN_FUNCTIONAL_ASSESSMENT: ACTIVITIES ARE NOT PREVENTED

## 2019-01-21 ASSESSMENT — PAIN DESCRIPTION - ONSET
ONSET: ON-GOING

## 2019-01-21 ASSESSMENT — PAIN DESCRIPTION - FREQUENCY
FREQUENCY: CONTINUOUS

## 2019-01-21 ASSESSMENT — PAIN DESCRIPTION - PROGRESSION
CLINICAL_PROGRESSION: NOT CHANGED
CLINICAL_PROGRESSION: GRADUALLY WORSENING
CLINICAL_PROGRESSION: NOT CHANGED
CLINICAL_PROGRESSION: GRADUALLY WORSENING
CLINICAL_PROGRESSION: NOT CHANGED

## 2019-01-21 ASSESSMENT — PAIN DESCRIPTION - DESCRIPTORS
DESCRIPTORS: ACHING

## 2019-01-22 VITALS
SYSTOLIC BLOOD PRESSURE: 138 MMHG | BODY MASS INDEX: 31.29 KG/M2 | WEIGHT: 159.39 LBS | HEART RATE: 102 BPM | HEIGHT: 60 IN | TEMPERATURE: 98.6 F | RESPIRATION RATE: 20 BRPM | OXYGEN SATURATION: 95 % | DIASTOLIC BLOOD PRESSURE: 78 MMHG

## 2019-01-22 DIAGNOSIS — J45.901 ACUTE EXACERBATION OF COPD WITH ASTHMA (HCC): ICD-10-CM

## 2019-01-22 DIAGNOSIS — J44.1 ACUTE EXACERBATION OF COPD WITH ASTHMA (HCC): ICD-10-CM

## 2019-01-22 LAB
ANION GAP SERPL CALCULATED.3IONS-SCNC: 10 MMOL/L (ref 3–16)
BASOPHILS ABSOLUTE: 0.1 K/UL (ref 0–0.2)
BASOPHILS RELATIVE PERCENT: 0.5 %
BUN BLDV-MCNC: 31 MG/DL (ref 7–20)
CALCIUM SERPL-MCNC: 9.3 MG/DL (ref 8.3–10.6)
CHLORIDE BLD-SCNC: 97 MMOL/L (ref 99–110)
CO2: 32 MMOL/L (ref 21–32)
CREAT SERPL-MCNC: 0.8 MG/DL (ref 0.6–1.1)
EOSINOPHILS ABSOLUTE: 0.1 K/UL (ref 0–0.6)
EOSINOPHILS RELATIVE PERCENT: 0.7 %
GFR AFRICAN AMERICAN: >60
GFR NON-AFRICAN AMERICAN: >60
GLUCOSE BLD-MCNC: 83 MG/DL (ref 70–99)
HCT VFR BLD CALC: 43.6 % (ref 36–48)
HEMOGLOBIN: 14.3 G/DL (ref 12–16)
LYMPHOCYTES ABSOLUTE: 2.9 K/UL (ref 1–5.1)
LYMPHOCYTES RELATIVE PERCENT: 17.5 %
MCH RBC QN AUTO: 27.8 PG (ref 26–34)
MCHC RBC AUTO-ENTMCNC: 32.9 G/DL (ref 31–36)
MCV RBC AUTO: 84.5 FL (ref 80–100)
MONOCYTES ABSOLUTE: 1.4 K/UL (ref 0–1.3)
MONOCYTES RELATIVE PERCENT: 8.7 %
NEUTROPHILS ABSOLUTE: 11.9 K/UL (ref 1.7–7.7)
NEUTROPHILS RELATIVE PERCENT: 72.6 %
PDW BLD-RTO: 15.1 % (ref 12.4–15.4)
PLATELET # BLD: 379 K/UL (ref 135–450)
PMV BLD AUTO: 7.3 FL (ref 5–10.5)
POTASSIUM REFLEX MAGNESIUM: 4.6 MMOL/L (ref 3.5–5.1)
RBC # BLD: 5.16 M/UL (ref 4–5.2)
SODIUM BLD-SCNC: 139 MMOL/L (ref 136–145)
WBC # BLD: 16.4 K/UL (ref 4–11)

## 2019-01-22 PROCEDURE — 6370000000 HC RX 637 (ALT 250 FOR IP): Performed by: INTERNAL MEDICINE

## 2019-01-22 PROCEDURE — 94664 DEMO&/EVAL PT USE INHALER: CPT

## 2019-01-22 PROCEDURE — 94760 N-INVAS EAR/PLS OXIMETRY 1: CPT

## 2019-01-22 PROCEDURE — 99232 SBSQ HOSP IP/OBS MODERATE 35: CPT | Performed by: INTERNAL MEDICINE

## 2019-01-22 PROCEDURE — 2580000003 HC RX 258: Performed by: INTERNAL MEDICINE

## 2019-01-22 PROCEDURE — 94640 AIRWAY INHALATION TREATMENT: CPT

## 2019-01-22 PROCEDURE — 80048 BASIC METABOLIC PNL TOTAL CA: CPT

## 2019-01-22 PROCEDURE — 6360000002 HC RX W HCPCS: Performed by: INTERNAL MEDICINE

## 2019-01-22 PROCEDURE — 85025 COMPLETE CBC W/AUTO DIFF WBC: CPT

## 2019-01-22 RX ORDER — GUAIFENESIN 600 MG/1
600 TABLET, EXTENDED RELEASE ORAL 2 TIMES DAILY
Qty: 30 TABLET | Refills: 0 | Status: SHIPPED | OUTPATIENT
Start: 2019-01-22 | End: 2019-02-06

## 2019-01-22 RX ORDER — PREDNISONE 20 MG/1
20 TABLET ORAL DAILY
Qty: 3 TABLET | Refills: 0 | Status: SHIPPED | OUTPATIENT
Start: 2019-01-24 | End: 2019-01-27

## 2019-01-22 RX ORDER — IPRATROPIUM BROMIDE AND ALBUTEROL SULFATE 2.5; .5 MG/3ML; MG/3ML
3 SOLUTION RESPIRATORY (INHALATION)
Status: ON HOLD | COMMUNITY
End: 2019-01-22 | Stop reason: SDUPTHER

## 2019-01-22 RX ORDER — IPRATROPIUM BROMIDE AND ALBUTEROL SULFATE 2.5; .5 MG/3ML; MG/3ML
3 SOLUTION RESPIRATORY (INHALATION) EVERY 4 HOURS PRN
Qty: 360 ML | Refills: 1 | Status: SHIPPED | OUTPATIENT
Start: 2019-01-22 | End: 2019-02-21

## 2019-01-22 RX ORDER — PREDNISONE 10 MG/1
10 TABLET ORAL DAILY
Qty: 3 TABLET | Refills: 0 | Status: SHIPPED | OUTPATIENT
Start: 2019-01-27 | End: 2019-01-30

## 2019-01-22 RX ORDER — PREDNISONE 10 MG/1
TABLET ORAL
Qty: 13 TABLET | Refills: 0 | Status: ON HOLD | OUTPATIENT
Start: 2019-01-22 | End: 2019-02-24 | Stop reason: HOSPADM

## 2019-01-22 RX ORDER — PREDNISONE 1 MG/1
5 TABLET ORAL DAILY
Qty: 3 TABLET | Refills: 0 | Status: SHIPPED | OUTPATIENT
Start: 2019-01-30 | End: 2019-02-02

## 2019-01-22 RX ORDER — PREDNISONE 20 MG/1
40 TABLET ORAL DAILY
Qty: 2 TABLET | Refills: 0 | Status: SHIPPED | OUTPATIENT
Start: 2019-01-23 | End: 2019-01-24

## 2019-01-22 RX ORDER — PROMETHAZINE HYDROCHLORIDE 25 MG/1
25 TABLET ORAL EVERY 6 HOURS PRN
Qty: 28 TABLET | Refills: 0 | Status: SHIPPED | OUTPATIENT
Start: 2019-01-22 | End: 2019-01-29

## 2019-01-22 RX ORDER — PREDNISONE 10 MG/1
10 TABLET ORAL DAILY
Qty: 3 TABLET | Refills: 0 | Status: CANCELLED | OUTPATIENT
Start: 2019-01-27 | End: 2019-01-30

## 2019-01-22 RX ORDER — BUDESONIDE AND FORMOTEROL FUMARATE DIHYDRATE 160; 4.5 UG/1; UG/1
AEROSOL RESPIRATORY (INHALATION)
Qty: 1 INHALER | Refills: 1 | Status: SHIPPED | OUTPATIENT
Start: 2019-01-22

## 2019-01-22 RX ADMIN — BUDESONIDE 500 MCG: 0.5 SUSPENSION RESPIRATORY (INHALATION) at 07:25

## 2019-01-22 RX ADMIN — FORMOTEROL FUMARATE DIHYDRATE 20 MCG: 20 SOLUTION RESPIRATORY (INHALATION) at 07:33

## 2019-01-22 RX ADMIN — OXYCODONE HYDROCHLORIDE 15 MG: 5 TABLET ORAL at 09:02

## 2019-01-22 RX ADMIN — IPRATROPIUM BROMIDE AND ALBUTEROL SULFATE 1 AMPULE: 2.5; .5 SOLUTION RESPIRATORY (INHALATION) at 07:15

## 2019-01-22 RX ADMIN — Medication 10 ML: at 08:09

## 2019-01-22 RX ADMIN — OXYCODONE HYDROCHLORIDE 15 MG: 5 TABLET ORAL at 04:40

## 2019-01-22 RX ADMIN — THEOPHYLLINE 400 MG: 400 TABLET, EXTENDED RELEASE ORAL at 08:08

## 2019-01-22 RX ADMIN — ALPRAZOLAM 1 MG: 0.5 TABLET ORAL at 04:40

## 2019-01-22 RX ADMIN — FAMOTIDINE 20 MG: 20 TABLET ORAL at 08:07

## 2019-01-22 RX ADMIN — METHADONE HYDROCHLORIDE 30 MG: 10 TABLET ORAL at 04:40

## 2019-01-22 RX ADMIN — ALPRAZOLAM 1 MG: 0.5 TABLET ORAL at 08:07

## 2019-01-22 RX ADMIN — PREDNISONE 40 MG: 20 TABLET ORAL at 08:08

## 2019-01-22 RX ADMIN — LOSARTAN POTASSIUM 50 MG: 50 TABLET ORAL at 08:07

## 2019-01-22 ASSESSMENT — PAIN SCALES - GENERAL
PAINLEVEL_OUTOF10: 0
PAINLEVEL_OUTOF10: 0
PAINLEVEL_OUTOF10: 5
PAINLEVEL_OUTOF10: 7
PAINLEVEL_OUTOF10: 7
PAINLEVEL_OUTOF10: 5
PAINLEVEL_OUTOF10: 8

## 2019-01-22 ASSESSMENT — PAIN DESCRIPTION - PAIN TYPE
TYPE: CHRONIC PAIN

## 2019-01-22 ASSESSMENT — PAIN DESCRIPTION - LOCATION
LOCATION: LEG;KNEE

## 2019-01-22 ASSESSMENT — PAIN DESCRIPTION - ONSET: ONSET: ON-GOING

## 2019-01-22 ASSESSMENT — PAIN - FUNCTIONAL ASSESSMENT: PAIN_FUNCTIONAL_ASSESSMENT: ACTIVITIES ARE NOT PREVENTED

## 2019-01-22 ASSESSMENT — PAIN DESCRIPTION - PROGRESSION: CLINICAL_PROGRESSION: GRADUALLY WORSENING

## 2019-01-22 ASSESSMENT — PAIN DESCRIPTION - DESCRIPTORS: DESCRIPTORS: ACHING

## 2019-01-22 ASSESSMENT — PAIN DESCRIPTION - ORIENTATION: ORIENTATION: RIGHT;LEFT

## 2019-02-20 ENCOUNTER — TELEPHONE (OUTPATIENT)
Dept: OTHER | Facility: CLINIC | Age: 49
End: 2019-02-20

## 2019-02-20 ENCOUNTER — APPOINTMENT (OUTPATIENT)
Dept: GENERAL RADIOLOGY | Age: 49
DRG: 190 | End: 2019-02-20
Payer: COMMERCIAL

## 2019-02-20 ENCOUNTER — HOSPITAL ENCOUNTER (EMERGENCY)
Age: 49
Discharge: HOME | DRG: 190 | End: 2019-02-20
Attending: EMERGENCY MEDICINE
Payer: COMMERCIAL

## 2019-02-20 VITALS
HEIGHT: 60 IN | SYSTOLIC BLOOD PRESSURE: 131 MMHG | BODY MASS INDEX: 32.46 KG/M2 | RESPIRATION RATE: 21 BRPM | WEIGHT: 165.34 LBS | DIASTOLIC BLOOD PRESSURE: 85 MMHG | OXYGEN SATURATION: 94 % | HEART RATE: 98 BPM | TEMPERATURE: 98 F

## 2019-02-20 DIAGNOSIS — R06.02 SHORTNESS OF BREATH: ICD-10-CM

## 2019-02-20 DIAGNOSIS — R09.02 HYPOXIA: ICD-10-CM

## 2019-02-20 DIAGNOSIS — J44.1 COPD EXACERBATION (HCC): Primary | ICD-10-CM

## 2019-02-20 LAB
ANION GAP SERPL CALCULATED.3IONS-SCNC: 13 MMOL/L (ref 3–16)
BASE EXCESS VENOUS: 3.4 MMOL/L
BASOPHILS ABSOLUTE: 0.1 K/UL (ref 0–0.2)
BASOPHILS RELATIVE PERCENT: 1.3 %
BUN BLDV-MCNC: 23 MG/DL (ref 7–20)
CALCIUM SERPL-MCNC: 9.2 MG/DL (ref 8.3–10.6)
CARBOXYHEMOGLOBIN: 2.1 %
CHLORIDE BLD-SCNC: 98 MMOL/L (ref 99–110)
CO2: 29 MMOL/L (ref 21–32)
CREAT SERPL-MCNC: 0.9 MG/DL (ref 0.6–1.1)
EOSINOPHILS ABSOLUTE: 0.6 K/UL (ref 0–0.6)
EOSINOPHILS RELATIVE PERCENT: 8.4 %
GFR AFRICAN AMERICAN: >60
GFR NON-AFRICAN AMERICAN: >60
GLUCOSE BLD-MCNC: 114 MG/DL (ref 70–99)
HCO3 VENOUS: 30 MMOL/L (ref 23–29)
HCT VFR BLD CALC: 40 % (ref 36–48)
HEMOGLOBIN: 13.1 G/DL (ref 12–16)
LACTIC ACID: 1 MMOL/L (ref 0.4–2)
LYMPHOCYTES ABSOLUTE: 2 K/UL (ref 1–5.1)
LYMPHOCYTES RELATIVE PERCENT: 27.6 %
MCH RBC QN AUTO: 27.7 PG (ref 26–34)
MCHC RBC AUTO-ENTMCNC: 32.7 G/DL (ref 31–36)
MCV RBC AUTO: 84.7 FL (ref 80–100)
METHEMOGLOBIN VENOUS: 0.2 %
MONOCYTES ABSOLUTE: 0.6 K/UL (ref 0–1.3)
MONOCYTES RELATIVE PERCENT: 9.1 %
NEUTROPHILS ABSOLUTE: 3.8 K/UL (ref 1.7–7.7)
NEUTROPHILS RELATIVE PERCENT: 53.6 %
O2 CONTENT, VEN: 15 ML/DL
O2 SAT, VEN: 82 %
O2 THERAPY: ABNORMAL
PCO2, VEN: 59.4 MMHG (ref 40–50)
PDW BLD-RTO: 14.3 % (ref 12.4–15.4)
PH VENOUS: 7.33 (ref 7.35–7.45)
PLATELET # BLD: 316 K/UL (ref 135–450)
PMV BLD AUTO: 7.6 FL (ref 5–10.5)
PO2, VEN: 48 MMHG
POTASSIUM REFLEX MAGNESIUM: 4.1 MMOL/L (ref 3.5–5.1)
RBC # BLD: 4.73 M/UL (ref 4–5.2)
SODIUM BLD-SCNC: 140 MMOL/L (ref 136–145)
TCO2 CALC VENOUS: 32 MMOL/L
WBC # BLD: 7.1 K/UL (ref 4–11)

## 2019-02-20 PROCEDURE — 6370000000 HC RX 637 (ALT 250 FOR IP): Performed by: EMERGENCY MEDICINE

## 2019-02-20 PROCEDURE — 94664 DEMO&/EVAL PT USE INHALER: CPT

## 2019-02-20 PROCEDURE — 85025 COMPLETE CBC W/AUTO DIFF WBC: CPT

## 2019-02-20 PROCEDURE — 6360000002 HC RX W HCPCS: Performed by: EMERGENCY MEDICINE

## 2019-02-20 PROCEDURE — 71046 X-RAY EXAM CHEST 2 VIEWS: CPT

## 2019-02-20 PROCEDURE — 96374 THER/PROPH/DIAG INJ IV PUSH: CPT

## 2019-02-20 PROCEDURE — 93005 ELECTROCARDIOGRAM TRACING: CPT

## 2019-02-20 PROCEDURE — 82803 BLOOD GASES ANY COMBINATION: CPT

## 2019-02-20 PROCEDURE — 94762 N-INVAS EAR/PLS OXIMTRY CONT: CPT

## 2019-02-20 PROCEDURE — 80048 BASIC METABOLIC PNL TOTAL CA: CPT

## 2019-02-20 PROCEDURE — 83605 ASSAY OF LACTIC ACID: CPT

## 2019-02-20 PROCEDURE — 99285 EMERGENCY DEPT VISIT HI MDM: CPT

## 2019-02-20 PROCEDURE — 94640 AIRWAY INHALATION TREATMENT: CPT

## 2019-02-20 RX ORDER — IPRATROPIUM BROMIDE AND ALBUTEROL SULFATE 2.5; .5 MG/3ML; MG/3ML
1 SOLUTION RESPIRATORY (INHALATION) ONCE
Status: COMPLETED | OUTPATIENT
Start: 2019-02-20 | End: 2019-02-20

## 2019-02-20 RX ORDER — PREDNISONE 20 MG/1
60 TABLET ORAL ONCE
Status: COMPLETED | OUTPATIENT
Start: 2019-02-20 | End: 2019-02-20

## 2019-02-20 RX ORDER — ALBUTEROL SULFATE 90 UG/1
AEROSOL, METERED RESPIRATORY (INHALATION)
Qty: 1 INHALER | Refills: 0 | Status: SHIPPED | OUTPATIENT
Start: 2019-02-20 | End: 2019-02-21

## 2019-02-20 RX ORDER — ALBUTEROL SULFATE 2.5 MG/3ML
5 SOLUTION RESPIRATORY (INHALATION) ONCE
Status: COMPLETED | OUTPATIENT
Start: 2019-02-20 | End: 2019-02-20

## 2019-02-20 RX ORDER — ALPRAZOLAM 0.5 MG/1
1 TABLET ORAL ONCE
Status: COMPLETED | OUTPATIENT
Start: 2019-02-20 | End: 2019-02-20

## 2019-02-20 RX ORDER — ALPRAZOLAM 0.5 MG/1
0.5 TABLET ORAL ONCE
Status: DISCONTINUED | OUTPATIENT
Start: 2019-02-20 | End: 2019-02-20

## 2019-02-20 RX ORDER — LORAZEPAM 2 MG/ML
0.5 INJECTION INTRAMUSCULAR ONCE
Status: COMPLETED | OUTPATIENT
Start: 2019-02-20 | End: 2019-02-20

## 2019-02-20 RX ORDER — PREDNISONE 10 MG/1
60 TABLET ORAL DAILY
Qty: 30 TABLET | Refills: 0 | Status: SHIPPED | OUTPATIENT
Start: 2019-02-20 | End: 2019-02-21

## 2019-02-20 RX ORDER — BENZONATATE 100 MG/1
100 CAPSULE ORAL 3 TIMES DAILY PRN
Qty: 20 CAPSULE | Refills: 0 | Status: SHIPPED | OUTPATIENT
Start: 2019-02-20 | End: 2019-05-08 | Stop reason: ALTCHOICE

## 2019-02-20 RX ADMIN — IPRATROPIUM BROMIDE AND ALBUTEROL SULFATE 1 AMPULE: .5; 3 SOLUTION RESPIRATORY (INHALATION) at 16:08

## 2019-02-20 RX ADMIN — LORAZEPAM 0.5 MG: 2 INJECTION INTRAMUSCULAR; INTRAVENOUS at 17:11

## 2019-02-20 RX ADMIN — ALPRAZOLAM 1 MG: 0.5 TABLET ORAL at 17:40

## 2019-02-20 RX ADMIN — ALBUTEROL SULFATE 5 MG: 2.5 SOLUTION RESPIRATORY (INHALATION) at 17:20

## 2019-02-20 RX ADMIN — IPRATROPIUM BROMIDE AND ALBUTEROL SULFATE 1 AMPULE: .5; 3 SOLUTION RESPIRATORY (INHALATION) at 16:06

## 2019-02-20 RX ADMIN — PREDNISONE 60 MG: 20 TABLET ORAL at 17:10

## 2019-02-20 ASSESSMENT — PAIN DESCRIPTION - FREQUENCY: FREQUENCY: CONTINUOUS

## 2019-02-20 ASSESSMENT — PAIN SCALES - GENERAL: PAINLEVEL_OUTOF10: 9

## 2019-02-20 ASSESSMENT — PAIN DESCRIPTION - LOCATION: LOCATION: CHEST

## 2019-02-20 ASSESSMENT — PULMONARY FUNCTION TESTS: PEFR_L/MIN: 104

## 2019-02-20 ASSESSMENT — PAIN DESCRIPTION - PAIN TYPE: TYPE: ACUTE PAIN

## 2019-02-21 ENCOUNTER — HOSPITAL ENCOUNTER (INPATIENT)
Age: 49
LOS: 3 days | Discharge: HOME | DRG: 190 | End: 2019-02-24
Attending: EMERGENCY MEDICINE | Admitting: INTERNAL MEDICINE
Payer: COMMERCIAL

## 2019-02-21 ENCOUNTER — APPOINTMENT (OUTPATIENT)
Dept: GENERAL RADIOLOGY | Age: 49
DRG: 190 | End: 2019-02-21
Payer: COMMERCIAL

## 2019-02-21 ENCOUNTER — TELEPHONE (OUTPATIENT)
Dept: OTHER | Facility: CLINIC | Age: 49
End: 2019-02-21

## 2019-02-21 ENCOUNTER — TELEPHONE (OUTPATIENT)
Dept: PULMONOLOGY | Age: 49
End: 2019-02-21

## 2019-02-21 DIAGNOSIS — A41.9 SEPSIS, DUE TO UNSPECIFIED ORGANISM: ICD-10-CM

## 2019-02-21 DIAGNOSIS — J44.1 COPD EXACERBATION (HCC): Primary | ICD-10-CM

## 2019-02-21 DIAGNOSIS — R06.89 HYPERCAPNIA: ICD-10-CM

## 2019-02-21 LAB
ANION GAP SERPL CALCULATED.3IONS-SCNC: 15 MMOL/L (ref 3–16)
BASE EXCESS VENOUS: 1.1 MMOL/L
BASOPHILS ABSOLUTE: 0.1 K/UL (ref 0–0.2)
BASOPHILS RELATIVE PERCENT: 1.1 %
BUN BLDV-MCNC: 25 MG/DL (ref 7–20)
CALCIUM SERPL-MCNC: 9.7 MG/DL (ref 8.3–10.6)
CARBOXYHEMOGLOBIN: 1.5 %
CHLORIDE BLD-SCNC: 99 MMOL/L (ref 99–110)
CO2: 27 MMOL/L (ref 21–32)
CREAT SERPL-MCNC: 1.2 MG/DL (ref 0.6–1.1)
EKG ATRIAL RATE: 106 BPM
EKG DIAGNOSIS: NORMAL
EKG P AXIS: 74 DEGREES
EKG P-R INTERVAL: 136 MS
EKG Q-T INTERVAL: 322 MS
EKG QRS DURATION: 80 MS
EKG QTC CALCULATION (BAZETT): 427 MS
EKG R AXIS: 54 DEGREES
EKG T AXIS: 61 DEGREES
EKG VENTRICULAR RATE: 106 BPM
EOSINOPHILS ABSOLUTE: 0.1 K/UL (ref 0–0.6)
EOSINOPHILS RELATIVE PERCENT: 1.2 %
GFR AFRICAN AMERICAN: 57
GFR NON-AFRICAN AMERICAN: 47
GLUCOSE BLD-MCNC: 94 MG/DL (ref 70–99)
HCO3 VENOUS: 29 MMOL/L (ref 23–29)
HCT VFR BLD CALC: 43 % (ref 36–48)
HEMOGLOBIN: 13.8 G/DL (ref 12–16)
LACTIC ACID: 2.4 MMOL/L (ref 0.4–2)
LYMPHOCYTES ABSOLUTE: 2.7 K/UL (ref 1–5.1)
LYMPHOCYTES RELATIVE PERCENT: 23.4 %
MCH RBC QN AUTO: 27.2 PG (ref 26–34)
MCHC RBC AUTO-ENTMCNC: 32 G/DL (ref 31–36)
MCV RBC AUTO: 85.1 FL (ref 80–100)
METHEMOGLOBIN VENOUS: 0.6 %
MONOCYTES ABSOLUTE: 1.1 K/UL (ref 0–1.3)
MONOCYTES RELATIVE PERCENT: 9 %
NEUTROPHILS ABSOLUTE: 7.7 K/UL (ref 1.7–7.7)
NEUTROPHILS RELATIVE PERCENT: 65.3 %
O2 CONTENT, VEN: 13 ML/DL
O2 SAT, VEN: 66 %
O2 THERAPY: ABNORMAL
PCO2, VEN: 66.5 MMHG (ref 40–50)
PDW BLD-RTO: 14 % (ref 12.4–15.4)
PH VENOUS: 7.27 (ref 7.35–7.45)
PLATELET # BLD: 398 K/UL (ref 135–450)
PMV BLD AUTO: 7.6 FL (ref 5–10.5)
PO2, VEN: 40 MMHG
POTASSIUM REFLEX MAGNESIUM: 4 MMOL/L (ref 3.5–5.1)
PRO-BNP: 69 PG/ML (ref 0–124)
RBC # BLD: 5.06 M/UL (ref 4–5.2)
SODIUM BLD-SCNC: 141 MMOL/L (ref 136–145)
TCO2 CALC VENOUS: 32 MMOL/L
TROPONIN: <0.01 NG/ML
WBC # BLD: 11.7 K/UL (ref 4–11)

## 2019-02-21 PROCEDURE — 80048 BASIC METABOLIC PNL TOTAL CA: CPT

## 2019-02-21 PROCEDURE — 82803 BLOOD GASES ANY COMBINATION: CPT

## 2019-02-21 PROCEDURE — 94664 DEMO&/EVAL PT USE INHALER: CPT

## 2019-02-21 PROCEDURE — 6370000000 HC RX 637 (ALT 250 FOR IP): Performed by: EMERGENCY MEDICINE

## 2019-02-21 PROCEDURE — 96367 TX/PROPH/DG ADDL SEQ IV INF: CPT

## 2019-02-21 PROCEDURE — 96375 TX/PRO/DX INJ NEW DRUG ADDON: CPT

## 2019-02-21 PROCEDURE — 6370000000 HC RX 637 (ALT 250 FOR IP): Performed by: INTERNAL MEDICINE

## 2019-02-21 PROCEDURE — 2580000003 HC RX 258: Performed by: EMERGENCY MEDICINE

## 2019-02-21 PROCEDURE — 83880 ASSAY OF NATRIURETIC PEPTIDE: CPT

## 2019-02-21 PROCEDURE — 83605 ASSAY OF LACTIC ACID: CPT

## 2019-02-21 PROCEDURE — 99285 EMERGENCY DEPT VISIT HI MDM: CPT

## 2019-02-21 PROCEDURE — 96376 TX/PRO/DX INJ SAME DRUG ADON: CPT

## 2019-02-21 PROCEDURE — 1200000000 HC SEMI PRIVATE

## 2019-02-21 PROCEDURE — 84484 ASSAY OF TROPONIN QUANT: CPT

## 2019-02-21 PROCEDURE — 6360000002 HC RX W HCPCS: Performed by: INTERNAL MEDICINE

## 2019-02-21 PROCEDURE — 2580000003 HC RX 258: Performed by: INTERNAL MEDICINE

## 2019-02-21 PROCEDURE — 6360000002 HC RX W HCPCS: Performed by: EMERGENCY MEDICINE

## 2019-02-21 PROCEDURE — 96365 THER/PROPH/DIAG IV INF INIT: CPT

## 2019-02-21 PROCEDURE — 85025 COMPLETE CBC W/AUTO DIFF WBC: CPT

## 2019-02-21 PROCEDURE — 94762 N-INVAS EAR/PLS OXIMTRY CONT: CPT

## 2019-02-21 PROCEDURE — 96366 THER/PROPH/DIAG IV INF ADDON: CPT

## 2019-02-21 PROCEDURE — 71045 X-RAY EXAM CHEST 1 VIEW: CPT

## 2019-02-21 PROCEDURE — 94640 AIRWAY INHALATION TREATMENT: CPT

## 2019-02-21 RX ORDER — PREDNISONE 20 MG/1
60 TABLET ORAL ONCE
Status: DISCONTINUED | OUTPATIENT
Start: 2019-02-21 | End: 2019-02-21

## 2019-02-21 RX ORDER — METHYLPREDNISOLONE SODIUM SUCCINATE 125 MG/2ML
125 INJECTION, POWDER, LYOPHILIZED, FOR SOLUTION INTRAMUSCULAR; INTRAVENOUS ONCE
Status: COMPLETED | OUTPATIENT
Start: 2019-02-21 | End: 2019-02-21

## 2019-02-21 RX ORDER — ALPRAZOLAM 0.5 MG/1
1 TABLET ORAL 4 TIMES DAILY
Status: DISCONTINUED | OUTPATIENT
Start: 2019-02-21 | End: 2019-02-24 | Stop reason: HOSPADM

## 2019-02-21 RX ORDER — MONTELUKAST SODIUM 10 MG/1
10 TABLET ORAL NIGHTLY
Status: DISCONTINUED | OUTPATIENT
Start: 2019-02-21 | End: 2019-02-24 | Stop reason: HOSPADM

## 2019-02-21 RX ORDER — ACETAMINOPHEN 325 MG/1
650 TABLET ORAL EVERY 4 HOURS PRN
Status: DISCONTINUED | OUTPATIENT
Start: 2019-02-21 | End: 2019-02-24 | Stop reason: HOSPADM

## 2019-02-21 RX ORDER — ALPRAZOLAM 0.5 MG/1
1 TABLET ORAL ONCE
Status: COMPLETED | OUTPATIENT
Start: 2019-02-21 | End: 2019-02-21

## 2019-02-21 RX ORDER — MAGNESIUM SULFATE IN WATER 40 MG/ML
2 INJECTION, SOLUTION INTRAVENOUS ONCE
Status: COMPLETED | OUTPATIENT
Start: 2019-02-21 | End: 2019-02-21

## 2019-02-21 RX ORDER — THEOPHYLLINE 400 MG/1
400 TABLET, EXTENDED RELEASE ORAL DAILY
Status: DISCONTINUED | OUTPATIENT
Start: 2019-02-21 | End: 2019-02-22

## 2019-02-21 RX ORDER — METHADONE HYDROCHLORIDE 10 MG/1
30 TABLET ORAL
Status: DISCONTINUED | OUTPATIENT
Start: 2019-02-22 | End: 2019-02-24 | Stop reason: HOSPADM

## 2019-02-21 RX ORDER — 0.9 % SODIUM CHLORIDE 0.9 %
1000 INTRAVENOUS SOLUTION INTRAVENOUS ONCE
Status: COMPLETED | OUTPATIENT
Start: 2019-02-21 | End: 2019-02-21

## 2019-02-21 RX ORDER — BENZONATATE 100 MG/1
100 CAPSULE ORAL 3 TIMES DAILY PRN
Status: DISCONTINUED | OUTPATIENT
Start: 2019-02-21 | End: 2019-02-24 | Stop reason: HOSPADM

## 2019-02-21 RX ORDER — ALBUTEROL SULFATE 2.5 MG/3ML
5 SOLUTION RESPIRATORY (INHALATION) ONCE
Status: COMPLETED | OUTPATIENT
Start: 2019-02-21 | End: 2019-02-21

## 2019-02-21 RX ORDER — ZOLPIDEM TARTRATE 5 MG/1
5 TABLET ORAL NIGHTLY PRN
Status: ON HOLD | COMMUNITY
End: 2022-01-19

## 2019-02-21 RX ORDER — SODIUM CHLORIDE 0.9 % (FLUSH) 0.9 %
10 SYRINGE (ML) INJECTION PRN
Status: DISCONTINUED | OUTPATIENT
Start: 2019-02-21 | End: 2019-02-24 | Stop reason: HOSPADM

## 2019-02-21 RX ORDER — SODIUM CHLORIDE 0.9 % (FLUSH) 0.9 %
10 SYRINGE (ML) INJECTION EVERY 12 HOURS SCHEDULED
Status: DISCONTINUED | OUTPATIENT
Start: 2019-02-21 | End: 2019-02-24 | Stop reason: HOSPADM

## 2019-02-21 RX ORDER — IPRATROPIUM BROMIDE AND ALBUTEROL SULFATE 2.5; .5 MG/3ML; MG/3ML
1 SOLUTION RESPIRATORY (INHALATION)
Status: DISCONTINUED | OUTPATIENT
Start: 2019-02-21 | End: 2019-02-24 | Stop reason: HOSPADM

## 2019-02-21 RX ORDER — IBUPROFEN 600 MG/1
600 TABLET ORAL EVERY 8 HOURS PRN
COMMUNITY

## 2019-02-21 RX ORDER — IPRATROPIUM BROMIDE AND ALBUTEROL SULFATE 2.5; .5 MG/3ML; MG/3ML
1 SOLUTION RESPIRATORY (INHALATION) ONCE
Status: COMPLETED | OUTPATIENT
Start: 2019-02-21 | End: 2019-02-21

## 2019-02-21 RX ORDER — LEVOFLOXACIN 5 MG/ML
750 INJECTION, SOLUTION INTRAVENOUS ONCE
Status: COMPLETED | OUTPATIENT
Start: 2019-02-21 | End: 2019-02-21

## 2019-02-21 RX ORDER — ONDANSETRON 2 MG/ML
4 INJECTION INTRAMUSCULAR; INTRAVENOUS EVERY 6 HOURS PRN
Status: DISCONTINUED | OUTPATIENT
Start: 2019-02-21 | End: 2019-02-24 | Stop reason: HOSPADM

## 2019-02-21 RX ORDER — DOXYCYCLINE HYCLATE 100 MG
100 TABLET ORAL EVERY 12 HOURS SCHEDULED
Status: DISCONTINUED | OUTPATIENT
Start: 2019-02-21 | End: 2019-02-24

## 2019-02-21 RX ORDER — METHYLPREDNISOLONE SODIUM SUCCINATE 40 MG/ML
40 INJECTION, POWDER, LYOPHILIZED, FOR SOLUTION INTRAMUSCULAR; INTRAVENOUS EVERY 6 HOURS
Status: DISCONTINUED | OUTPATIENT
Start: 2019-02-21 | End: 2019-02-22

## 2019-02-21 RX ORDER — METHADONE HYDROCHLORIDE 10 MG/1
20 TABLET ORAL DAILY
Status: DISCONTINUED | OUTPATIENT
Start: 2019-02-21 | End: 2019-02-24 | Stop reason: HOSPADM

## 2019-02-21 RX ADMIN — DOXYCYCLINE HYCLATE 100 MG: 100 TABLET, COATED ORAL at 21:18

## 2019-02-21 RX ADMIN — MONTELUKAST SODIUM 10 MG: 10 TABLET, FILM COATED ORAL at 21:18

## 2019-02-21 RX ADMIN — IPRATROPIUM BROMIDE AND ALBUTEROL SULFATE 1 AMPULE: .5; 3 SOLUTION RESPIRATORY (INHALATION) at 21:02

## 2019-02-21 RX ADMIN — METHADONE HYDROCHLORIDE 20 MG: 10 TABLET ORAL at 18:22

## 2019-02-21 RX ADMIN — LEVOFLOXACIN 750 MG: 5 INJECTION, SOLUTION INTRAVENOUS at 14:33

## 2019-02-21 RX ADMIN — METHYLPREDNISOLONE SODIUM SUCCINATE 40 MG: 40 INJECTION, POWDER, FOR SOLUTION INTRAMUSCULAR; INTRAVENOUS at 18:22

## 2019-02-21 RX ADMIN — THEOPHYLLINE 400 MG: 400 TABLET, EXTENDED RELEASE ORAL at 18:22

## 2019-02-21 RX ADMIN — SODIUM CHLORIDE 1000 ML: 9 INJECTION, SOLUTION INTRAVENOUS at 14:04

## 2019-02-21 RX ADMIN — IPRATROPIUM BROMIDE AND ALBUTEROL SULFATE 1 AMPULE: .5; 3 SOLUTION RESPIRATORY (INHALATION) at 13:04

## 2019-02-21 RX ADMIN — MOMETASONE FUROATE AND FORMOTEROL FUMARATE DIHYDRATE 2 PUFF: 100; 5 AEROSOL RESPIRATORY (INHALATION) at 21:03

## 2019-02-21 RX ADMIN — ALPRAZOLAM 1 MG: 0.5 TABLET ORAL at 17:13

## 2019-02-21 RX ADMIN — ALBUTEROL SULFATE 5 MG: 2.5 SOLUTION RESPIRATORY (INHALATION) at 13:04

## 2019-02-21 RX ADMIN — MAGNESIUM SULFATE HEPTAHYDRATE 2 G: 40 INJECTION, SOLUTION INTRAVENOUS at 14:02

## 2019-02-21 RX ADMIN — OXYCODONE HYDROCHLORIDE 15 MG: 5 TABLET ORAL at 20:09

## 2019-02-21 RX ADMIN — DEXTROSE MONOHYDRATE 1 G: 5 INJECTION INTRAVENOUS at 20:09

## 2019-02-21 RX ADMIN — ALPRAZOLAM 1 MG: 0.5 TABLET ORAL at 20:09

## 2019-02-21 RX ADMIN — Medication 10 ML: at 20:09

## 2019-02-21 RX ADMIN — METHYLPREDNISOLONE SODIUM SUCCINATE 125 MG: 125 INJECTION, POWDER, FOR SOLUTION INTRAMUSCULAR; INTRAVENOUS at 13:28

## 2019-02-21 ASSESSMENT — ENCOUNTER SYMPTOMS
ABDOMINAL DISTENTION: 0
COUGH: 1
WHEEZING: 0
STRIDOR: 0
APNEA: 0
EYE DISCHARGE: 0
CHOKING: 0
EYE ITCHING: 0
SHORTNESS OF BREATH: 1
PHOTOPHOBIA: 0
EYE PAIN: 0
CHEST TIGHTNESS: 0
EYE REDNESS: 0

## 2019-02-21 ASSESSMENT — PAIN DESCRIPTION - DESCRIPTORS: DESCRIPTORS: CONSTANT

## 2019-02-21 ASSESSMENT — PAIN DESCRIPTION - LOCATION
LOCATION: LEG
LOCATION: LEG
LOCATION: CHEST;BACK

## 2019-02-21 ASSESSMENT — PAIN SCALES - GENERAL
PAINLEVEL_OUTOF10: 7
PAINLEVEL_OUTOF10: 9
PAINLEVEL_OUTOF10: 5
PAINLEVEL_OUTOF10: 4
PAINLEVEL_OUTOF10: 9

## 2019-02-21 ASSESSMENT — PAIN DESCRIPTION - PAIN TYPE
TYPE: CHRONIC PAIN
TYPE: ACUTE PAIN
TYPE: CHRONIC PAIN

## 2019-02-21 ASSESSMENT — PAIN DESCRIPTION - FREQUENCY
FREQUENCY: CONTINUOUS
FREQUENCY: CONTINUOUS

## 2019-02-21 ASSESSMENT — PAIN DESCRIPTION - ORIENTATION: ORIENTATION: RIGHT;LEFT

## 2019-02-21 ASSESSMENT — PAIN DESCRIPTION - PROGRESSION: CLINICAL_PROGRESSION: GRADUALLY WORSENING

## 2019-02-22 LAB
ORGANISM: ABNORMAL
PROCALCITONIN: 0.04 NG/ML (ref 0–0.15)
REPORT: NORMAL
RESPIRATORY PANEL PCR: ABNORMAL
RESPIRATORY PANEL PCR: ABNORMAL
THEOPHYLLINE LEVEL: 10.5 UG/ML (ref 8–20)

## 2019-02-22 PROCEDURE — 87486 CHLMYD PNEUM DNA AMP PROBE: CPT

## 2019-02-22 PROCEDURE — 80198 ASSAY OF THEOPHYLLINE: CPT

## 2019-02-22 PROCEDURE — 87581 M.PNEUMON DNA AMP PROBE: CPT

## 2019-02-22 PROCEDURE — 6360000002 HC RX W HCPCS: Performed by: INTERNAL MEDICINE

## 2019-02-22 PROCEDURE — 84145 PROCALCITONIN (PCT): CPT

## 2019-02-22 PROCEDURE — 36415 COLL VENOUS BLD VENIPUNCTURE: CPT

## 2019-02-22 PROCEDURE — 6370000000 HC RX 637 (ALT 250 FOR IP): Performed by: INTERNAL MEDICINE

## 2019-02-22 PROCEDURE — 87798 DETECT AGENT NOS DNA AMP: CPT

## 2019-02-22 PROCEDURE — 2580000003 HC RX 258: Performed by: INTERNAL MEDICINE

## 2019-02-22 PROCEDURE — 36591 DRAW BLOOD OFF VENOUS DEVICE: CPT

## 2019-02-22 PROCEDURE — 99223 1ST HOSP IP/OBS HIGH 75: CPT | Performed by: INTERNAL MEDICINE

## 2019-02-22 PROCEDURE — 6370000000 HC RX 637 (ALT 250 FOR IP): Performed by: NURSE PRACTITIONER

## 2019-02-22 PROCEDURE — 94640 AIRWAY INHALATION TREATMENT: CPT

## 2019-02-22 PROCEDURE — 96376 TX/PRO/DX INJ SAME DRUG ADON: CPT

## 2019-02-22 PROCEDURE — 1200000000 HC SEMI PRIVATE

## 2019-02-22 PROCEDURE — 87633 RESP VIRUS 12-25 TARGETS: CPT

## 2019-02-22 RX ORDER — OSELTAMIVIR PHOSPHATE 30 MG/1
30 CAPSULE ORAL 2 TIMES DAILY
Status: DISCONTINUED | OUTPATIENT
Start: 2019-02-22 | End: 2019-02-23

## 2019-02-22 RX ORDER — METHYLPREDNISOLONE SODIUM SUCCINATE 40 MG/ML
40 INJECTION, POWDER, LYOPHILIZED, FOR SOLUTION INTRAMUSCULAR; INTRAVENOUS EVERY 12 HOURS
Status: DISCONTINUED | OUTPATIENT
Start: 2019-02-22 | End: 2019-02-24 | Stop reason: HOSPADM

## 2019-02-22 RX ORDER — SODIUM CHLORIDE FOR INHALATION 3 %
15 VIAL, NEBULIZER (ML) INHALATION 3 TIMES DAILY
Status: DISCONTINUED | OUTPATIENT
Start: 2019-02-22 | End: 2019-02-24 | Stop reason: HOSPADM

## 2019-02-22 RX ORDER — THEOPHYLLINE 400 MG/1
200 TABLET, EXTENDED RELEASE ORAL DAILY
Status: DISCONTINUED | OUTPATIENT
Start: 2019-02-23 | End: 2019-02-24 | Stop reason: HOSPADM

## 2019-02-22 RX ADMIN — DOXYCYCLINE HYCLATE 100 MG: 100 TABLET, COATED ORAL at 20:13

## 2019-02-22 RX ADMIN — Medication 10 ML: at 01:39

## 2019-02-22 RX ADMIN — Medication 2 PUFF: at 20:20

## 2019-02-22 RX ADMIN — Medication 10 ML: at 08:09

## 2019-02-22 RX ADMIN — OXYCODONE HYDROCHLORIDE 15 MG: 5 TABLET ORAL at 00:20

## 2019-02-22 RX ADMIN — Medication 10 ML: at 06:38

## 2019-02-22 RX ADMIN — IPRATROPIUM BROMIDE AND ALBUTEROL SULFATE 1 AMPULE: .5; 3 SOLUTION RESPIRATORY (INHALATION) at 16:48

## 2019-02-22 RX ADMIN — OXYCODONE HYDROCHLORIDE 15 MG: 5 TABLET ORAL at 15:08

## 2019-02-22 RX ADMIN — METHYLPREDNISOLONE SODIUM SUCCINATE 40 MG: 40 INJECTION, POWDER, FOR SOLUTION INTRAMUSCULAR; INTRAVENOUS at 17:36

## 2019-02-22 RX ADMIN — ALPRAZOLAM 1 MG: 0.5 TABLET ORAL at 03:49

## 2019-02-22 RX ADMIN — THEOPHYLLINE 400 MG: 400 TABLET, EXTENDED RELEASE ORAL at 08:06

## 2019-02-22 RX ADMIN — MONTELUKAST SODIUM 10 MG: 10 TABLET, FILM COATED ORAL at 20:13

## 2019-02-22 RX ADMIN — METHADONE HYDROCHLORIDE 20 MG: 10 TABLET ORAL at 16:03

## 2019-02-22 RX ADMIN — MOMETASONE FUROATE AND FORMOTEROL FUMARATE DIHYDRATE 2 PUFF: 100; 5 AEROSOL RESPIRATORY (INHALATION) at 08:42

## 2019-02-22 RX ADMIN — DOXYCYCLINE HYCLATE 100 MG: 100 TABLET, COATED ORAL at 08:07

## 2019-02-22 RX ADMIN — IPRATROPIUM BROMIDE AND ALBUTEROL SULFATE 1 AMPULE: .5; 3 SOLUTION RESPIRATORY (INHALATION) at 12:22

## 2019-02-22 RX ADMIN — ALPRAZOLAM 1 MG: 0.5 TABLET ORAL at 08:07

## 2019-02-22 RX ADMIN — METHYLPREDNISOLONE SODIUM SUCCINATE 40 MG: 40 INJECTION, POWDER, FOR SOLUTION INTRAMUSCULAR; INTRAVENOUS at 01:39

## 2019-02-22 RX ADMIN — Medication 10 ML: at 17:36

## 2019-02-22 RX ADMIN — Medication 10 ML: at 20:14

## 2019-02-22 RX ADMIN — SODIUM CHLORIDE SOLN NEBU 3% 15 ML: 3 NEBU SOLN at 20:20

## 2019-02-22 RX ADMIN — OXYCODONE HYDROCHLORIDE 15 MG: 5 TABLET ORAL at 10:56

## 2019-02-22 RX ADMIN — METHADONE HYDROCHLORIDE 30 MG: 10 TABLET ORAL at 03:49

## 2019-02-22 RX ADMIN — ALPRAZOLAM 1 MG: 0.5 TABLET ORAL at 20:13

## 2019-02-22 RX ADMIN — IPRATROPIUM BROMIDE AND ALBUTEROL SULFATE 1 AMPULE: .5; 3 SOLUTION RESPIRATORY (INHALATION) at 20:20

## 2019-02-22 RX ADMIN — OXYCODONE HYDROCHLORIDE 15 MG: 5 TABLET ORAL at 06:38

## 2019-02-22 RX ADMIN — OXYCODONE HYDROCHLORIDE 15 MG: 5 TABLET ORAL at 21:12

## 2019-02-22 RX ADMIN — ALPRAZOLAM 1 MG: 0.5 TABLET ORAL at 14:01

## 2019-02-22 RX ADMIN — METHYLPREDNISOLONE SODIUM SUCCINATE 40 MG: 40 INJECTION, POWDER, FOR SOLUTION INTRAMUSCULAR; INTRAVENOUS at 06:37

## 2019-02-22 RX ADMIN — OSELTAMIVIR PHOSPHATE 30 MG: 30 CAPSULE ORAL at 21:12

## 2019-02-22 RX ADMIN — IPRATROPIUM BROMIDE AND ALBUTEROL SULFATE 1 AMPULE: .5; 3 SOLUTION RESPIRATORY (INHALATION) at 08:42

## 2019-02-22 RX ADMIN — SODIUM CHLORIDE SOLN NEBU 3% 15 ML: 3 NEBU SOLN at 12:22

## 2019-02-22 ASSESSMENT — PAIN SCALES - GENERAL
PAINLEVEL_OUTOF10: 7
PAINLEVEL_OUTOF10: 5
PAINLEVEL_OUTOF10: 4
PAINLEVEL_OUTOF10: 4
PAINLEVEL_OUTOF10: 6
PAINLEVEL_OUTOF10: 6
PAINLEVEL_OUTOF10: 7
PAINLEVEL_OUTOF10: 6

## 2019-02-22 ASSESSMENT — PAIN DESCRIPTION - LOCATION
LOCATION: LEG;BACK
LOCATION: LEG

## 2019-02-22 ASSESSMENT — PAIN DESCRIPTION - PAIN TYPE
TYPE: CHRONIC PAIN
TYPE: CHRONIC PAIN

## 2019-02-22 ASSESSMENT — PAIN DESCRIPTION - FREQUENCY: FREQUENCY: CONTINUOUS

## 2019-02-23 LAB
ALBUMIN SERPL-MCNC: 3.9 G/DL (ref 3.4–5)
ANION GAP SERPL CALCULATED.3IONS-SCNC: 14 MMOL/L (ref 3–16)
BUN BLDV-MCNC: 20 MG/DL (ref 7–20)
CALCIUM SERPL-MCNC: 9.2 MG/DL (ref 8.3–10.6)
CHLORIDE BLD-SCNC: 102 MMOL/L (ref 99–110)
CO2: 26 MMOL/L (ref 21–32)
CREAT SERPL-MCNC: 0.8 MG/DL (ref 0.6–1.1)
GFR AFRICAN AMERICAN: >60
GFR NON-AFRICAN AMERICAN: >60
GLUCOSE BLD-MCNC: 113 MG/DL (ref 70–99)
PHOSPHORUS: 3.6 MG/DL (ref 2.5–4.9)
POTASSIUM SERPL-SCNC: 3.5 MMOL/L (ref 3.5–5.1)
SODIUM BLD-SCNC: 142 MMOL/L (ref 136–145)

## 2019-02-23 PROCEDURE — 6370000000 HC RX 637 (ALT 250 FOR IP): Performed by: INTERNAL MEDICINE

## 2019-02-23 PROCEDURE — 6360000002 HC RX W HCPCS: Performed by: INTERNAL MEDICINE

## 2019-02-23 PROCEDURE — 1200000000 HC SEMI PRIVATE

## 2019-02-23 PROCEDURE — 94640 AIRWAY INHALATION TREATMENT: CPT

## 2019-02-23 PROCEDURE — 6370000000 HC RX 637 (ALT 250 FOR IP): Performed by: NURSE PRACTITIONER

## 2019-02-23 PROCEDURE — 80069 RENAL FUNCTION PANEL: CPT

## 2019-02-23 PROCEDURE — 2580000003 HC RX 258: Performed by: INTERNAL MEDICINE

## 2019-02-23 PROCEDURE — 94761 N-INVAS EAR/PLS OXIMETRY MLT: CPT

## 2019-02-23 PROCEDURE — 96376 TX/PRO/DX INJ SAME DRUG ADON: CPT

## 2019-02-23 RX ORDER — OSELTAMIVIR PHOSPHATE 75 MG/1
75 CAPSULE ORAL 2 TIMES DAILY
Status: DISCONTINUED | OUTPATIENT
Start: 2019-02-23 | End: 2019-02-24 | Stop reason: HOSPADM

## 2019-02-23 RX ADMIN — OXYCODONE HYDROCHLORIDE 15 MG: 5 TABLET ORAL at 18:28

## 2019-02-23 RX ADMIN — OXYCODONE HYDROCHLORIDE 15 MG: 5 TABLET ORAL at 10:18

## 2019-02-23 RX ADMIN — OXYCODONE HYDROCHLORIDE 15 MG: 5 TABLET ORAL at 14:21

## 2019-02-23 RX ADMIN — Medication 10 ML: at 20:43

## 2019-02-23 RX ADMIN — OXYCODONE HYDROCHLORIDE 15 MG: 5 TABLET ORAL at 06:13

## 2019-02-23 RX ADMIN — Medication 10 ML: at 06:13

## 2019-02-23 RX ADMIN — OXYCODONE HYDROCHLORIDE 15 MG: 5 TABLET ORAL at 23:39

## 2019-02-23 RX ADMIN — THEOPHYLLINE 200 MG: 400 TABLET, EXTENDED RELEASE ORAL at 08:27

## 2019-02-23 RX ADMIN — ALPRAZOLAM 1 MG: 0.5 TABLET ORAL at 14:08

## 2019-02-23 RX ADMIN — DOXYCYCLINE HYCLATE 100 MG: 100 TABLET, COATED ORAL at 20:39

## 2019-02-23 RX ADMIN — ALPRAZOLAM 1 MG: 0.5 TABLET ORAL at 03:54

## 2019-02-23 RX ADMIN — IPRATROPIUM BROMIDE AND ALBUTEROL SULFATE 1 AMPULE: .5; 3 SOLUTION RESPIRATORY (INHALATION) at 20:03

## 2019-02-23 RX ADMIN — IPRATROPIUM BROMIDE AND ALBUTEROL SULFATE 1 AMPULE: .5; 3 SOLUTION RESPIRATORY (INHALATION) at 08:08

## 2019-02-23 RX ADMIN — Medication 2 PUFF: at 20:03

## 2019-02-23 RX ADMIN — DOXYCYCLINE HYCLATE 100 MG: 100 TABLET, COATED ORAL at 08:27

## 2019-02-23 RX ADMIN — METHYLPREDNISOLONE SODIUM SUCCINATE 40 MG: 40 INJECTION, POWDER, FOR SOLUTION INTRAMUSCULAR; INTRAVENOUS at 06:13

## 2019-02-23 RX ADMIN — ALPRAZOLAM 1 MG: 0.5 TABLET ORAL at 08:27

## 2019-02-23 RX ADMIN — OSELTAMIVIR PHOSPHATE 75 MG: 75 CAPSULE ORAL at 20:39

## 2019-02-23 RX ADMIN — Medication 2 PUFF: at 08:08

## 2019-02-23 RX ADMIN — SODIUM CHLORIDE SOLN NEBU 3% 15 ML: 3 NEBU SOLN at 08:08

## 2019-02-23 RX ADMIN — OXYCODONE HYDROCHLORIDE 15 MG: 5 TABLET ORAL at 01:26

## 2019-02-23 RX ADMIN — ALPRAZOLAM 1 MG: 0.5 TABLET ORAL at 20:39

## 2019-02-23 RX ADMIN — METHADONE HYDROCHLORIDE 30 MG: 10 TABLET ORAL at 03:54

## 2019-02-23 RX ADMIN — IPRATROPIUM BROMIDE AND ALBUTEROL SULFATE 1 AMPULE: .5; 3 SOLUTION RESPIRATORY (INHALATION) at 16:08

## 2019-02-23 RX ADMIN — METHADONE HYDROCHLORIDE 20 MG: 10 TABLET ORAL at 16:21

## 2019-02-23 RX ADMIN — Medication 10 ML: at 08:27

## 2019-02-23 RX ADMIN — OSELTAMIVIR PHOSPHATE 30 MG: 30 CAPSULE ORAL at 08:27

## 2019-02-23 RX ADMIN — METHYLPREDNISOLONE SODIUM SUCCINATE 40 MG: 40 INJECTION, POWDER, FOR SOLUTION INTRAMUSCULAR; INTRAVENOUS at 18:28

## 2019-02-23 RX ADMIN — IPRATROPIUM BROMIDE AND ALBUTEROL SULFATE 1 AMPULE: .5; 3 SOLUTION RESPIRATORY (INHALATION) at 12:20

## 2019-02-23 RX ADMIN — MONTELUKAST SODIUM 10 MG: 10 TABLET, FILM COATED ORAL at 20:39

## 2019-02-23 ASSESSMENT — PAIN DESCRIPTION - PROGRESSION
CLINICAL_PROGRESSION: GRADUALLY IMPROVING
CLINICAL_PROGRESSION: GRADUALLY IMPROVING
CLINICAL_PROGRESSION: NOT CHANGED
CLINICAL_PROGRESSION: GRADUALLY IMPROVING
CLINICAL_PROGRESSION: GRADUALLY IMPROVING

## 2019-02-23 ASSESSMENT — PAIN DESCRIPTION - ONSET
ONSET: ON-GOING
ONSET: GRADUAL
ONSET: GRADUAL
ONSET: ON-GOING
ONSET: GRADUAL
ONSET: ON-GOING
ONSET: ON-GOING
ONSET: GRADUAL

## 2019-02-23 ASSESSMENT — PAIN SCALES - GENERAL
PAINLEVEL_OUTOF10: 7
PAINLEVEL_OUTOF10: 7
PAINLEVEL_OUTOF10: 8
PAINLEVEL_OUTOF10: 4
PAINLEVEL_OUTOF10: 5
PAINLEVEL_OUTOF10: 5
PAINLEVEL_OUTOF10: 4
PAINLEVEL_OUTOF10: 6
PAINLEVEL_OUTOF10: 5
PAINLEVEL_OUTOF10: 7
PAINLEVEL_OUTOF10: 7
PAINLEVEL_OUTOF10: 6
PAINLEVEL_OUTOF10: 6
PAINLEVEL_OUTOF10: 4

## 2019-02-23 ASSESSMENT — PAIN DESCRIPTION - LOCATION
LOCATION: KNEE
LOCATION: KNEE;LEG
LOCATION: KNEE

## 2019-02-23 ASSESSMENT — PAIN DESCRIPTION - DESCRIPTORS
DESCRIPTORS: ACHING

## 2019-02-23 ASSESSMENT — PAIN DESCRIPTION - PAIN TYPE
TYPE: CHRONIC PAIN

## 2019-02-23 ASSESSMENT — PAIN DESCRIPTION - FREQUENCY
FREQUENCY: CONTINUOUS

## 2019-02-24 VITALS
SYSTOLIC BLOOD PRESSURE: 126 MMHG | WEIGHT: 165.79 LBS | OXYGEN SATURATION: 96 % | TEMPERATURE: 98.4 F | DIASTOLIC BLOOD PRESSURE: 85 MMHG | BODY MASS INDEX: 32.55 KG/M2 | HEIGHT: 60 IN | HEART RATE: 84 BPM | RESPIRATION RATE: 20 BRPM

## 2019-02-24 LAB
ALBUMIN SERPL-MCNC: 4 G/DL (ref 3.4–5)
ANION GAP SERPL CALCULATED.3IONS-SCNC: 13 MMOL/L (ref 3–16)
BUN BLDV-MCNC: 16 MG/DL (ref 7–20)
CALCIUM SERPL-MCNC: 8.9 MG/DL (ref 8.3–10.6)
CHLORIDE BLD-SCNC: 101 MMOL/L (ref 99–110)
CO2: 27 MMOL/L (ref 21–32)
CREAT SERPL-MCNC: 0.8 MG/DL (ref 0.6–1.1)
GFR AFRICAN AMERICAN: >60
GFR NON-AFRICAN AMERICAN: >60
GLUCOSE BLD-MCNC: 108 MG/DL (ref 70–99)
PHOSPHORUS: 4 MG/DL (ref 2.5–4.9)
POTASSIUM SERPL-SCNC: 3.7 MMOL/L (ref 3.5–5.1)
SODIUM BLD-SCNC: 141 MMOL/L (ref 136–145)

## 2019-02-24 PROCEDURE — 96376 TX/PRO/DX INJ SAME DRUG ADON: CPT

## 2019-02-24 PROCEDURE — 6370000000 HC RX 637 (ALT 250 FOR IP): Performed by: INTERNAL MEDICINE

## 2019-02-24 PROCEDURE — 6360000002 HC RX W HCPCS: Performed by: INTERNAL MEDICINE

## 2019-02-24 PROCEDURE — 94640 AIRWAY INHALATION TREATMENT: CPT

## 2019-02-24 PROCEDURE — 80069 RENAL FUNCTION PANEL: CPT

## 2019-02-24 PROCEDURE — 94761 N-INVAS EAR/PLS OXIMETRY MLT: CPT

## 2019-02-24 PROCEDURE — 2580000003 HC RX 258: Performed by: INTERNAL MEDICINE

## 2019-02-24 PROCEDURE — 94760 N-INVAS EAR/PLS OXIMETRY 1: CPT

## 2019-02-24 RX ORDER — PREDNISONE 10 MG/1
40 TABLET ORAL DAILY
Qty: 20 TABLET | Refills: 0 | Status: SHIPPED | OUTPATIENT
Start: 2019-02-24 | End: 2019-03-01

## 2019-02-24 RX ORDER — OSELTAMIVIR PHOSPHATE 75 MG/1
75 CAPSULE ORAL 2 TIMES DAILY
Qty: 7 CAPSULE | Refills: 0 | Status: SHIPPED | OUTPATIENT
Start: 2019-02-24 | End: 2019-02-28

## 2019-02-24 RX ADMIN — METHADONE HYDROCHLORIDE 30 MG: 10 TABLET ORAL at 03:53

## 2019-02-24 RX ADMIN — Medication 10 ML: at 08:16

## 2019-02-24 RX ADMIN — OXYCODONE HYDROCHLORIDE 15 MG: 5 TABLET ORAL at 09:33

## 2019-02-24 RX ADMIN — THEOPHYLLINE 200 MG: 400 TABLET, EXTENDED RELEASE ORAL at 08:15

## 2019-02-24 RX ADMIN — SODIUM CHLORIDE SOLN NEBU 3% 15 ML: 3 NEBU SOLN at 08:19

## 2019-02-24 RX ADMIN — IPRATROPIUM BROMIDE AND ALBUTEROL SULFATE 1 AMPULE: .5; 3 SOLUTION RESPIRATORY (INHALATION) at 08:19

## 2019-02-24 RX ADMIN — DOXYCYCLINE HYCLATE 100 MG: 100 TABLET, COATED ORAL at 08:15

## 2019-02-24 RX ADMIN — OXYCODONE HYDROCHLORIDE 15 MG: 5 TABLET ORAL at 05:19

## 2019-02-24 RX ADMIN — ALPRAZOLAM 1 MG: 0.5 TABLET ORAL at 08:15

## 2019-02-24 RX ADMIN — OSELTAMIVIR PHOSPHATE 75 MG: 75 CAPSULE ORAL at 08:15

## 2019-02-24 RX ADMIN — Medication 2 PUFF: at 08:19

## 2019-02-24 RX ADMIN — ALPRAZOLAM 1 MG: 0.5 TABLET ORAL at 03:53

## 2019-02-24 RX ADMIN — METHYLPREDNISOLONE SODIUM SUCCINATE 40 MG: 40 INJECTION, POWDER, FOR SOLUTION INTRAMUSCULAR; INTRAVENOUS at 05:19

## 2019-02-24 RX ADMIN — IPRATROPIUM BROMIDE AND ALBUTEROL SULFATE 1 AMPULE: .5; 3 SOLUTION RESPIRATORY (INHALATION) at 11:43

## 2019-02-24 ASSESSMENT — PAIN SCALES - GENERAL
PAINLEVEL_OUTOF10: 6
PAINLEVEL_OUTOF10: 6
PAINLEVEL_OUTOF10: 9

## 2019-02-27 DIAGNOSIS — J45.901 ACUTE EXACERBATION OF COPD WITH ASTHMA (HCC): ICD-10-CM

## 2019-02-27 DIAGNOSIS — J44.1 ACUTE EXACERBATION OF COPD WITH ASTHMA (HCC): ICD-10-CM

## 2019-02-27 RX ORDER — BUDESONIDE AND FORMOTEROL FUMARATE DIHYDRATE 160; 4.5 UG/1; UG/1
2 AEROSOL RESPIRATORY (INHALATION) 2 TIMES DAILY
Qty: 1 INHALER | Refills: 5 | Status: SHIPPED | OUTPATIENT
Start: 2019-02-27 | End: 2019-05-08 | Stop reason: SDUPTHER

## 2019-02-27 RX ORDER — THEOPHYLLINE 400 MG/1
400 TABLET, EXTENDED RELEASE ORAL DAILY
Qty: 30 TABLET | Refills: 3 | Status: SHIPPED | OUTPATIENT
Start: 2019-02-27 | End: 2019-02-27 | Stop reason: SDUPTHER

## 2019-02-27 RX ORDER — IPRATROPIUM BROMIDE AND ALBUTEROL SULFATE 2.5; .5 MG/3ML; MG/3ML
1 SOLUTION RESPIRATORY (INHALATION) EVERY 4 HOURS PRN
Qty: 360 ML | Refills: 3 | Status: ON HOLD | OUTPATIENT
Start: 2019-02-27 | End: 2019-06-10 | Stop reason: SDUPTHER

## 2019-02-27 RX ORDER — ALBUTEROL SULFATE 90 UG/1
2 AEROSOL, METERED RESPIRATORY (INHALATION) EVERY 4 HOURS PRN
Qty: 1 INHALER | Refills: 5 | Status: SHIPPED | OUTPATIENT
Start: 2019-02-27 | End: 2019-02-27 | Stop reason: SDUPTHER

## 2019-05-08 ENCOUNTER — OFFICE VISIT (OUTPATIENT)
Dept: PULMONOLOGY | Age: 49
End: 2019-05-08
Payer: COMMERCIAL

## 2019-05-08 VITALS — OXYGEN SATURATION: 93 % | HEART RATE: 88 BPM | DIASTOLIC BLOOD PRESSURE: 68 MMHG | SYSTOLIC BLOOD PRESSURE: 118 MMHG

## 2019-05-08 DIAGNOSIS — R05.9 COUGH: ICD-10-CM

## 2019-05-08 DIAGNOSIS — J45.51 SEVERE PERSISTENT ASTHMA WITH ACUTE EXACERBATION: ICD-10-CM

## 2019-05-08 DIAGNOSIS — Z79.899 HIGH RISK MEDICATION USE: Primary | ICD-10-CM

## 2019-05-08 PROCEDURE — 99214 OFFICE O/P EST MOD 30 MIN: CPT | Performed by: INTERNAL MEDICINE

## 2019-05-08 RX ORDER — GUAIFENESIN, PSEUDOEPHEDRINE HYDROCHLORIDE 600; 60 MG/1; MG/1
2 TABLET, EXTENDED RELEASE ORAL EVERY 12 HOURS
Qty: 60 TABLET | Refills: 1 | Status: ON HOLD | OUTPATIENT
Start: 2019-05-08 | End: 2019-06-10 | Stop reason: HOSPADM

## 2019-05-08 RX ORDER — BENZONATATE 200 MG/1
200 CAPSULE ORAL 3 TIMES DAILY PRN
Qty: 30 CAPSULE | Refills: 0 | Status: SHIPPED | OUTPATIENT
Start: 2019-05-08 | End: 2019-05-18

## 2019-05-08 RX ORDER — FUROSEMIDE 20 MG/1
20 TABLET ORAL DAILY
Qty: 14 TABLET | Refills: 0 | Status: SHIPPED | OUTPATIENT
Start: 2019-05-08 | End: 2019-06-06

## 2019-05-08 RX ORDER — PREDNISONE 20 MG/1
TABLET ORAL
Qty: 17 TABLET | Refills: 0 | Status: ON HOLD | OUTPATIENT
Start: 2019-05-08 | End: 2019-06-10 | Stop reason: HOSPADM

## 2019-05-08 NOTE — PROGRESS NOTES
Chief complaint  This is a 46y.o. year old female  who presents with a chief complaint of   Chief Complaint   Patient presents with    Follow-up    Asthma       HPI  68-year-old woman with asthma and possible vocal cord dysfunction presents for follow-up. She was last admitted to [facility] in 2019 for influenza A and asthma exacerbation. She says for the past 2 months she has had increased shortness of breath. She wakes up at least once a night due to shortness of breath and has to use her nebulizer. She is using Ventolin several times a day. She reports intermittent wheeze and productive cough. She is compliant with Symbicort twice daily. She has home oxygen which she uses as needed. She has been taking Mucinex D which helps her cough. She was prescribed theophylline 400 mg daily, but has been taking 200 mg because she says it makes her nauseous. She previously tried Spiriva, but says it did not help. Past Medical History:   Diagnosis Date    Anxiety and depression     Arthritis     Asthma     Chronic pain syndrome     Complication of joint prosthesis (HCC)     COPD (chronic obstructive pulmonary disease) (HCC)     Failed back surgical syndrome     Failed neck syndrome     Fibromyalgia     GERD (gastroesophageal reflux disease)     Influenza A 2019    MDRO (multiple drug resistant organisms) resistance 2018    sputum    Obesity (BMI 30-39. 9)     Pneumonia        Past Surgical History:   Procedure Laterality Date     SECTION      x2    HERNIA REPAIR      JOINT REPLACEMENT      right knee    KNEE ARTHROCENTESIS      removal of bilateral knees    SPINE SURGERY      5  back surgeries     UPPER GASTROINTESTINAL ENDOSCOPY  2015       Current Outpatient Medications   Medication Sig Dispense Refill    predniSONE (DELTASONE) 20 MG tablet Take 40 mg daily for 5 days, then 20 mg daily for 5 days, then 10 mg daily for 4 days, then stop 17 tablet 0    Umeclidinium Bromide (INCRUSE ELLIPTA) 62.5 MCG/INH AEPB Inhale 1 puff into the lungs daily 1 each 0    furosemide (LASIX) 20 MG tablet Take 1 tablet by mouth daily for 14 days 14 tablet 0    benzonatate (TESSALON) 200 MG capsule Take 1 capsule by mouth 3 times daily as needed for Cough 30 capsule 0    theophylline (DARCY-24) 200 MG extended release capsule Take 1 capsule by mouth daily 30 capsule 3    pseudoephedrine-guaiFENesin (MUCINEX D)  MG per extended release tablet Take 2 tablets by mouth every 12 hours 60 tablet 1    ipratropium-albuterol (DUONEB) 0.5-2.5 (3) MG/3ML SOLN nebulizer solution Take 3 mLs by nebulization every 4 hours as needed for Shortness of Breath 360 mL 3    albuterol sulfate  (90 Base) MCG/ACT inhaler INHALE 2 PUFFS INTO THE LUNGS EVERY 4 HOURS AS NEEDED FOR WHEEZING OR SHORTNESS OF BREATH 3 Inhaler 0    theophylline (UNIPHYL) 400 MG extended release tablet TAKE 1 TABLET BY MOUTH DAILY (Patient taking differently: Take 200 mg by mouth daily ) 90 tablet 0    zolpidem (AMBIEN) 5 MG tablet Take 5 mg by mouth nightly as needed for Sleep. Pepper Frankel ibuprofen (ADVIL;MOTRIN) 600 MG tablet Take 600 mg by mouth 3 times daily      GARCINIA CAMBOGIA-CHROMIUM PO Take 2 capsules by mouth daily      budesonide-formoterol (SYMBICORT) 160-4.5 MCG/ACT AERO INHALE TWO PUFFS BY MOUTH TWICE A DAY -- MUST BE SEEN for future refills 1 Inhaler 1    oxymetazoline (AFRIN) 0.05 % nasal spray 2 sprays by Nasal route 2 times daily as needed for Congestion      ALPRAZolam (XANAX) 1 MG tablet Take 1 mg by mouth 4 times daily. 0400, 0800, 1400, 2000.  oxyCODONE (OXY-IR) 15 MG immediate release tablet Take 15 mg by mouth every 4 hours as needed for Pain      methadone (DOLOPHINE) 10 MG tablet Take by mouth every morning. 30mg at 0400, 20mg at 1600.       fluticasone (FLONASE) 50 MCG/ACT nasal spray 1 spray by Nasal route nightly        No current facility-administered medications for this visit.         Allergies   Allergen Reactions    Gabapentin Other (See Comments)     Confused    Penicillins      Swelling    Morphine Rash    Morphine And Related Rash       Family History   Problem Relation Age of Onset    COPD Mother     Asthma Father        Social History     Socioeconomic History    Marital status: Single     Spouse name: Not on file    Number of children: 2    Years of education: Not on file    Highest education level: Not on file   Occupational History    Not on file   Social Needs    Financial resource strain: Not on file    Food insecurity:     Worry: Not on file     Inability: Not on file    Transportation needs:     Medical: Not on file     Non-medical: Not on file   Tobacco Use    Smoking status: Never Smoker    Smokeless tobacco: Never Used    Tobacco comment: encouraged to never smoke    Substance and Sexual Activity    Alcohol use: No     Alcohol/week: 0.0 oz    Drug use: No    Sexual activity: Not Currently   Lifestyle    Physical activity:     Days per week: Not on file     Minutes per session: Not on file    Stress: Not on file   Relationships    Social connections:     Talks on phone: Not on file     Gets together: Not on file     Attends [employer] service: Not on file     Active member of club or organization: Not on file     Attends meetings of clubs or organizations: Not on file     Relationship status: Not on file    Intimate partner violence:     Fear of current or ex partner: Not on file     Emotionally abused: Not on file     Physically abused: Not on file     Forced sexual activity: Not on file   Other Topics Concern    Not on file   Social History Narrative    Not on file       Immunization History   Administered Date(s) Administered    Influenza Vaccine, unspecified formulation 11/30/2006, 12/01/2009, 12/28/2011, 10/01/2012, 11/10/2013, 11/01/2015, 11/19/2015, 10/14/2016, 08/01/2017    Influenza Virus Vaccine 11/19/2015, 10/14/2016, 10/09/2018  Influenza, Quadv, 3 Years and older, IM (Fluzone 3 yrs and older or Afluria 5 yrs and older) 10/14/2016    PPD Test 11/17/2013, 12/08/2013, 12/27/2013    Pneumococcal Polysaccharide (Udyueicgm21) 01/21/2010, 04/05/2017, 07/07/2017    Pneumococcal Vaccine, unspecified formulation 07/07/2017       ROS:  GENERAL:  No fevers, no chills  RESPIRATORY:  + shortness of breath, + wheezing, + cough      PHYSICAL EXAM:  Vitals:    05/08/19 0926   BP: 118/68   Pulse: 88   SpO2: 93%   on RA    Gen: Well developed; well nourished  Eyes: No scleral icterus. No conjunctival injection. ENT:  Oropharynx clear. External appearance of ears and nose normal.  Neck: Trachea midline. No obvious mass. No visible thyroid enlargement    Respiratory: Expiratory wheezes bilaterally, no accessory muscle use  Cardiovascular: Regular rate and rhythm, no appreciable murmurs. No edema. Gastrointestinal: Soft, non-tender. No hernia  Skin: Warm and dry. No rashes or ulcers on visible areas. Normal texture and turgor  Lymphatic: No cervical LAD. No supraclavicular LAD. Musculoskeletal: No cyanosis, clubbing or joint deformity. Psychiatric: Normal mood and affect; exhibits normal insight and judgement     Data reviewed:   Pulmonary Function Testing (10/8/15)  FEV1 1.78 L at 71% predicted ---> 2.2L (88% predicted)  FVC 2.35 L at 76% predicted ---> 2.9L (93% predicted)  FEV1/FVC ratio at 76%---> 76%  TLC 5.29 L at 119% predicted  VC 2.67L at 93% predicted  ERV 0.17L at 18% predicted  RV/TLC at 49% predicted  DLCO 20.7 at 97% predicted  DLCO/VA 4.32L at 106 % predicted     Overall: No obstruction; proportionate reduction in pre-bronchodilator flows with significant reversibility; reduced MVV (<30 x FEV1); air trapping; normal diffusion    Images and reports of chest imaging were reviewed by me. My interpretation is:  CXR (2/21/19): Clear lung fields; mediport and cervical hardware  Chest CT (7/28/15):  No LAD; questionable subtle tree-in-bud opacities in the medial right middle lobe and right lower lobe      ECHO (2/23/15)  Summary   Normal LV size and systolic function: EF is    60%. Grade I diastolic   dysfunction.   Trivial tricuspid regurgitation. Lab Results   Component Value Date    WBC 11.7 (H) 02/21/2019    HGB 13.8 02/21/2019    HCT 43.0 02/21/2019    MCV 85.1 02/21/2019     02/21/2019       No results found for: BNP    Lab Results   Component Value Date    CREATININE 0.8 02/24/2019    BUN 16 02/24/2019     02/24/2019    K 3.7 02/24/2019     02/24/2019    CO2 27 02/24/2019         Assessment/Plan:46y.o. year old female presents for follow up. Severe persistent asthma with acute exacerbation- Will give a 2 week taper of prednisone. She will continue Symbicort 160/4.5 mcg twice daily. Will add Incruse daily. We discussed that she should use Duonebs every 4 hours for this week. Then she may return to using them as needed. Decrease theophylline to 200 mg daily given nausea. She requests a prescription for Lasix because she says prednisone causes swelling. I will give her a 14 day supply of Lasix. We discussed that if she uses Lasix she should obtain a BMP in 2 weeks. Cough- Mucinex DM for symptomatic management. High risk medication use- I have asked her to obtain a BMP in 2 weeks if she takes the Lasix. She will return for follow-up in 3 months.       Hayden León MD  [facility] Pulmonology, Critical Care and Sleep

## 2019-05-29 ENCOUNTER — TELEPHONE (OUTPATIENT)
Dept: PULMONOLOGY | Age: 49
End: 2019-05-29

## 2019-05-29 DIAGNOSIS — J44.9 CHRONIC OBSTRUCTIVE PULMONARY DISEASE, UNSPECIFIED COPD TYPE (HCC): Primary | ICD-10-CM

## 2019-05-29 DIAGNOSIS — J45.51 SEVERE PERSISTENT ASTHMA WITH ACUTE EXACERBATION: ICD-10-CM

## 2019-05-29 DIAGNOSIS — R09.02 HYPOXIA: Primary | ICD-10-CM

## 2019-05-29 NOTE — TELEPHONE ENCOUNTER
Left message for patient to call regarding need for a 6 minute walk to re certify  her O2. If she can get it done before 6/7/19, she won't have to be seen again. Please sign order. May need an addendum on her last note regarding need for 6 min walk.

## 2019-06-06 ENCOUNTER — HOSPITAL ENCOUNTER (INPATIENT)
Age: 49
LOS: 4 days | Discharge: HOME | DRG: 193 | End: 2019-06-10
Attending: INTERNAL MEDICINE | Admitting: INTERNAL MEDICINE
Payer: COMMERCIAL

## 2019-06-06 ENCOUNTER — APPOINTMENT (OUTPATIENT)
Dept: GENERAL RADIOLOGY | Age: 49
DRG: 193 | End: 2019-06-06
Payer: COMMERCIAL

## 2019-06-06 DIAGNOSIS — R91.8 RIGHT LOWER LOBE PULMONARY INFILTRATE: ICD-10-CM

## 2019-06-06 DIAGNOSIS — J44.9 CHRONIC OBSTRUCTIVE PULMONARY DISEASE, UNSPECIFIED COPD TYPE (HCC): ICD-10-CM

## 2019-06-06 DIAGNOSIS — J96.01 ACUTE RESPIRATORY FAILURE WITH HYPOXIA AND HYPERCAPNIA (HCC): Primary | ICD-10-CM

## 2019-06-06 DIAGNOSIS — J96.02 ACUTE RESPIRATORY FAILURE WITH HYPOXIA AND HYPERCAPNIA (HCC): Primary | ICD-10-CM

## 2019-06-06 DIAGNOSIS — F17.210 CIGARETTE SMOKER: ICD-10-CM

## 2019-06-06 LAB
A/G RATIO: 1 (ref 1.1–2.2)
ALBUMIN SERPL-MCNC: 3.6 G/DL (ref 3.4–5)
ALP BLD-CCNC: 93 U/L (ref 40–129)
ALT SERPL-CCNC: 9 U/L (ref 10–40)
ANION GAP SERPL CALCULATED.3IONS-SCNC: 9 MMOL/L (ref 3–16)
AST SERPL-CCNC: 12 U/L (ref 15–37)
BASE EXCESS VENOUS: 1.5 MMOL/L
BASOPHILS ABSOLUTE: 0.1 K/UL (ref 0–0.2)
BASOPHILS RELATIVE PERCENT: 1.4 %
BILIRUB SERPL-MCNC: <0.2 MG/DL (ref 0–1)
BUN BLDV-MCNC: 15 MG/DL (ref 7–20)
CALCIUM SERPL-MCNC: 8.7 MG/DL (ref 8.3–10.6)
CARBOXYHEMOGLOBIN: 2.1 %
CHLORIDE BLD-SCNC: 105 MMOL/L (ref 99–110)
CO2: 27 MMOL/L (ref 21–32)
CREAT SERPL-MCNC: 0.7 MG/DL (ref 0.6–1.1)
EOSINOPHILS ABSOLUTE: 1.2 K/UL (ref 0–0.6)
EOSINOPHILS RELATIVE PERCENT: 18.1 %
GFR AFRICAN AMERICAN: >60
GFR NON-AFRICAN AMERICAN: >60
GLOBULIN: 3.5 G/DL
GLUCOSE BLD-MCNC: 108 MG/DL (ref 70–99)
HCG QUALITATIVE: NEGATIVE
HCO3 VENOUS: 29 MMOL/L (ref 23–29)
HCT VFR BLD CALC: 34.2 % (ref 36–48)
HEMOGLOBIN: 11 G/DL (ref 12–16)
LACTIC ACID: 0.9 MMOL/L (ref 0.4–2)
LYMPHOCYTES ABSOLUTE: 1.5 K/UL (ref 1–5.1)
LYMPHOCYTES RELATIVE PERCENT: 21.4 %
MCH RBC QN AUTO: 26.7 PG (ref 26–34)
MCHC RBC AUTO-ENTMCNC: 32 G/DL (ref 31–36)
MCV RBC AUTO: 83.5 FL (ref 80–100)
METHEMOGLOBIN VENOUS: 0.6 %
MONOCYTES ABSOLUTE: 0.5 K/UL (ref 0–1.3)
MONOCYTES RELATIVE PERCENT: 7.1 %
NEUTROPHILS ABSOLUTE: 3.5 K/UL (ref 1.7–7.7)
NEUTROPHILS RELATIVE PERCENT: 52 %
O2 CONTENT, VEN: 14 ML/DL
O2 SAT, VEN: 93 %
O2 THERAPY: ABNORMAL
PCO2, VEN: 64.8 MMHG (ref 40–50)
PDW BLD-RTO: 16.3 % (ref 12.4–15.4)
PH VENOUS: 7.27 (ref 7.35–7.45)
PLATELET # BLD: 230 K/UL (ref 135–450)
PMV BLD AUTO: 7.8 FL (ref 5–10.5)
PO2, VEN: 69 MMHG
POTASSIUM SERPL-SCNC: 4 MMOL/L (ref 3.5–5.1)
PRO-BNP: 233 PG/ML (ref 0–124)
RBC # BLD: 4.1 M/UL (ref 4–5.2)
SODIUM BLD-SCNC: 141 MMOL/L (ref 136–145)
TCO2 CALC VENOUS: 31 MMOL/L
TOTAL PROTEIN: 7.1 G/DL (ref 6.4–8.2)
TROPONIN: <0.01 NG/ML
WBC # BLD: 6.8 K/UL (ref 4–11)

## 2019-06-06 PROCEDURE — 6370000000 HC RX 637 (ALT 250 FOR IP): Performed by: NURSE PRACTITIONER

## 2019-06-06 PROCEDURE — 96361 HYDRATE IV INFUSION ADD-ON: CPT

## 2019-06-06 PROCEDURE — 84484 ASSAY OF TROPONIN QUANT: CPT

## 2019-06-06 PROCEDURE — 94660 CPAP INITIATION&MGMT: CPT

## 2019-06-06 PROCEDURE — 96365 THER/PROPH/DIAG IV INF INIT: CPT

## 2019-06-06 PROCEDURE — 85025 COMPLETE CBC W/AUTO DIFF WBC: CPT

## 2019-06-06 PROCEDURE — 99285 EMERGENCY DEPT VISIT HI MDM: CPT

## 2019-06-06 PROCEDURE — 83880 ASSAY OF NATRIURETIC PEPTIDE: CPT

## 2019-06-06 PROCEDURE — 84703 CHORIONIC GONADOTROPIN ASSAY: CPT

## 2019-06-06 PROCEDURE — 2060000000 HC ICU INTERMEDIATE R&B

## 2019-06-06 PROCEDURE — 93005 ELECTROCARDIOGRAM TRACING: CPT | Performed by: NURSE PRACTITIONER

## 2019-06-06 PROCEDURE — 71045 X-RAY EXAM CHEST 1 VIEW: CPT

## 2019-06-06 PROCEDURE — 2580000003 HC RX 258: Performed by: NURSE PRACTITIONER

## 2019-06-06 PROCEDURE — 80053 COMPREHEN METABOLIC PANEL: CPT

## 2019-06-06 PROCEDURE — 6360000002 HC RX W HCPCS: Performed by: NURSE PRACTITIONER

## 2019-06-06 PROCEDURE — 96375 TX/PRO/DX INJ NEW DRUG ADDON: CPT

## 2019-06-06 PROCEDURE — 96367 TX/PROPH/DG ADDL SEQ IV INF: CPT

## 2019-06-06 PROCEDURE — 94640 AIRWAY INHALATION TREATMENT: CPT

## 2019-06-06 PROCEDURE — 83605 ASSAY OF LACTIC ACID: CPT

## 2019-06-06 PROCEDURE — 94761 N-INVAS EAR/PLS OXIMETRY MLT: CPT

## 2019-06-06 PROCEDURE — 2700000000 HC OXYGEN THERAPY PER DAY

## 2019-06-06 PROCEDURE — 82803 BLOOD GASES ANY COMBINATION: CPT

## 2019-06-06 RX ORDER — 0.9 % SODIUM CHLORIDE 0.9 %
1000 INTRAVENOUS SOLUTION INTRAVENOUS ONCE
Status: COMPLETED | OUTPATIENT
Start: 2019-06-06 | End: 2019-06-06

## 2019-06-06 RX ORDER — PREDNISONE 20 MG/1
40 TABLET ORAL DAILY
Status: DISCONTINUED | OUTPATIENT
Start: 2019-06-09 | End: 2019-06-10 | Stop reason: HOSPADM

## 2019-06-06 RX ORDER — IPRATROPIUM BROMIDE AND ALBUTEROL SULFATE 2.5; .5 MG/3ML; MG/3ML
1 SOLUTION RESPIRATORY (INHALATION) EVERY 4 HOURS PRN
Status: DISCONTINUED | OUTPATIENT
Start: 2019-06-06 | End: 2019-06-10 | Stop reason: HOSPADM

## 2019-06-06 RX ORDER — IPRATROPIUM BROMIDE AND ALBUTEROL SULFATE 2.5; .5 MG/3ML; MG/3ML
1 SOLUTION RESPIRATORY (INHALATION)
Status: DISCONTINUED | OUTPATIENT
Start: 2019-06-07 | End: 2019-06-10 | Stop reason: HOSPADM

## 2019-06-06 RX ORDER — SODIUM CHLORIDE 0.9 % (FLUSH) 0.9 %
10 SYRINGE (ML) INJECTION PRN
Status: DISCONTINUED | OUTPATIENT
Start: 2019-06-06 | End: 2019-06-10 | Stop reason: HOSPADM

## 2019-06-06 RX ORDER — METHYLPREDNISOLONE SODIUM SUCCINATE 40 MG/ML
40 INJECTION, POWDER, LYOPHILIZED, FOR SOLUTION INTRAMUSCULAR; INTRAVENOUS EVERY 6 HOURS
Status: COMPLETED | OUTPATIENT
Start: 2019-06-07 | End: 2019-06-08

## 2019-06-06 RX ORDER — SODIUM CHLORIDE 9 MG/ML
INJECTION, SOLUTION INTRAVENOUS CONTINUOUS
Status: DISCONTINUED | OUTPATIENT
Start: 2019-06-06 | End: 2019-06-06

## 2019-06-06 RX ORDER — IPRATROPIUM BROMIDE AND ALBUTEROL SULFATE 2.5; .5 MG/3ML; MG/3ML
1 SOLUTION RESPIRATORY (INHALATION) ONCE
Status: COMPLETED | OUTPATIENT
Start: 2019-06-06 | End: 2019-06-06

## 2019-06-06 RX ORDER — LORAZEPAM 2 MG/ML
1 INJECTION INTRAMUSCULAR ONCE
Status: COMPLETED | OUTPATIENT
Start: 2019-06-06 | End: 2019-06-06

## 2019-06-06 RX ORDER — SODIUM CHLORIDE 0.9 % (FLUSH) 0.9 %
10 SYRINGE (ML) INJECTION EVERY 12 HOURS SCHEDULED
Status: DISCONTINUED | OUTPATIENT
Start: 2019-06-06 | End: 2019-06-10 | Stop reason: HOSPADM

## 2019-06-06 RX ORDER — ACETAMINOPHEN 325 MG/1
650 TABLET ORAL EVERY 4 HOURS PRN
Status: DISCONTINUED | OUTPATIENT
Start: 2019-06-06 | End: 2019-06-10 | Stop reason: HOSPADM

## 2019-06-06 RX ORDER — THEOPHYLLINE 400 MG/1
200 TABLET, EXTENDED RELEASE ORAL DAILY
Status: DISCONTINUED | OUTPATIENT
Start: 2019-06-07 | End: 2019-06-10 | Stop reason: HOSPADM

## 2019-06-06 RX ORDER — METHYLPREDNISOLONE SODIUM SUCCINATE 125 MG/2ML
125 INJECTION, POWDER, LYOPHILIZED, FOR SOLUTION INTRAMUSCULAR; INTRAVENOUS ONCE
Status: COMPLETED | OUTPATIENT
Start: 2019-06-06 | End: 2019-06-06

## 2019-06-06 RX ORDER — ALBUTEROL SULFATE 2.5 MG/3ML
5 SOLUTION RESPIRATORY (INHALATION) ONCE
Status: COMPLETED | OUTPATIENT
Start: 2019-06-06 | End: 2019-06-06

## 2019-06-06 RX ORDER — ONDANSETRON 2 MG/ML
4 INJECTION INTRAMUSCULAR; INTRAVENOUS EVERY 6 HOURS PRN
Status: DISCONTINUED | OUTPATIENT
Start: 2019-06-06 | End: 2019-06-10 | Stop reason: HOSPADM

## 2019-06-06 RX ADMIN — AZITHROMYCIN MONOHYDRATE 500 MG: 500 INJECTION, POWDER, LYOPHILIZED, FOR SOLUTION INTRAVENOUS at 21:09

## 2019-06-06 RX ADMIN — METHYLPREDNISOLONE SODIUM SUCCINATE 125 MG: 125 INJECTION, POWDER, FOR SOLUTION INTRAMUSCULAR; INTRAVENOUS at 19:40

## 2019-06-06 RX ADMIN — CEFTRIAXONE 1 G: 1 INJECTION, POWDER, FOR SOLUTION INTRAMUSCULAR; INTRAVENOUS at 20:53

## 2019-06-06 RX ADMIN — SODIUM CHLORIDE 1000 ML: 9 INJECTION, SOLUTION INTRAVENOUS at 19:34

## 2019-06-06 RX ADMIN — LORAZEPAM 1 MG: 2 INJECTION INTRAMUSCULAR; INTRAVENOUS at 19:26

## 2019-06-06 RX ADMIN — IPRATROPIUM BROMIDE AND ALBUTEROL SULFATE 1 AMPULE: .5; 3 SOLUTION RESPIRATORY (INHALATION) at 19:04

## 2019-06-06 RX ADMIN — ALBUTEROL SULFATE 5 MG: 2.5 SOLUTION RESPIRATORY (INHALATION) at 19:07

## 2019-06-06 ASSESSMENT — ENCOUNTER SYMPTOMS
BACK PAIN: 0
WHEEZING: 1
SHORTNESS OF BREATH: 1
COUGH: 1

## 2019-06-06 ASSESSMENT — PAIN DESCRIPTION - LOCATION
LOCATION: CHEST
LOCATION: KNEE;LEG

## 2019-06-06 ASSESSMENT — PAIN DESCRIPTION - PROGRESSION
CLINICAL_PROGRESSION: NOT CHANGED
CLINICAL_PROGRESSION: GRADUALLY WORSENING

## 2019-06-06 ASSESSMENT — PAIN SCALES - GENERAL
PAINLEVEL_OUTOF10: 9

## 2019-06-06 ASSESSMENT — PAIN DESCRIPTION - FREQUENCY
FREQUENCY: CONTINUOUS
FREQUENCY: CONTINUOUS

## 2019-06-06 ASSESSMENT — PAIN DESCRIPTION - ORIENTATION
ORIENTATION: LEFT
ORIENTATION: RIGHT;LEFT

## 2019-06-06 ASSESSMENT — PAIN DESCRIPTION - DESCRIPTORS
DESCRIPTORS: ACHING
DESCRIPTORS: ACHING

## 2019-06-06 ASSESSMENT — PAIN DESCRIPTION - ONSET
ONSET: ON-GOING
ONSET: ON-GOING

## 2019-06-06 ASSESSMENT — PAIN DESCRIPTION - PAIN TYPE
TYPE: CHRONIC PAIN
TYPE: ACUTE PAIN

## 2019-06-06 NOTE — ED NOTES
Pt placed pt on Bipap at this time; Appears to be tolerating the mask.       Tuan Ferreira RN  06/06/19 1930

## 2019-06-06 NOTE — ED NOTES
Report given to on coming RN Caron Favre, all questions answered. Pain assessment completed as documented.       Vilma Villegas RN  06/06/19 3128

## 2019-06-06 NOTE — ED PROVIDER NOTES
**EVALUATED BY ADVANCED PRACTICE PROVIDER**        [address]      Pt Name: Meliza Perrin  GBA:[id number]  [place] 1970  Date of evaluation: 2019  Provider: HA Pfeiffer CNP      Chief Complaint:    Chief Complaint   Patient presents with    Shortness of Breath       Nursing Notes, Past Medical Hx, Past Surgical Hx, Social Hx, Allergies, and Family Hx were all reviewed and agreed with or any disagreements were addressed in the HPI.    HPI:  (Location, Duration, Timing, Severity,Quality, Assoc Sx, Context, Modifying factors)  This is a  46 y.o. female with PMH significant for asthma, COPD, and every day smoker who presents to the emergency department today complaining of cough and shortness of breath. She was hypoxic in the mid 80s on arrival.  She states the shortness of breath has been worsening over the last week. She has been using breathing treatments at home. Cough is productive with thick sputum. No measured fevers at home. No chest pain. No swelling in her extremities. PastMedical/Surgical History:      Diagnosis Date    Anxiety and depression     Arthritis     Asthma     Chronic pain syndrome     Complication of joint prosthesis (HCC)     COPD (chronic obstructive pulmonary disease) (HCC)     Failed back surgical syndrome     Failed neck syndrome     Fibromyalgia     GERD (gastroesophageal reflux disease)     Influenza A 2019    MDRO (multiple drug resistant organisms) resistance 2018    sputum    Obesity (BMI 30-39. 9)     Pneumonia          Procedure Laterality Date     SECTION      x2    HERNIA REPAIR      JOINT REPLACEMENT      right knee    KNEE ARTHROCENTESIS      removal of bilateral knees    SPINE SURGERY      5  back surgeries     UPPER GASTROINTESTINAL ENDOSCOPY  2015       Medications:  Previous Medications    ALBUTEROL SULFATE  (90 BASE) All other systems reviewed and are negative. Positives and Pertinent negatives as per HPI. Except as noted above in the ROS, problem specific ROS was completed and is negative. Physical Exam:  Physical Exam   Constitutional: She is oriented to person, place, and time. Vital signs are normal. She appears well-developed and well-nourished. Non-toxic appearance. No distress. HENT:   Head: Normocephalic and atraumatic. Right Ear: Tympanic membrane and ear canal normal.   Left Ear: Tympanic membrane and ear canal normal.   Mouth/Throat: Uvula is midline, oropharynx is clear and moist and mucous membranes are normal.   Eyes: Pupils are equal, round, and reactive to light. Conjunctivae and EOM are normal. No scleral icterus. Neck: Full passive range of motion without pain. Neck supple. No JVD present. No neck rigidity. Cardiovascular: Normal rate and regular rhythm. Exam reveals no gallop and no friction rub. No murmur heard. Pulmonary/Chest: Effort normal. Tachypnea noted. No respiratory distress. She has wheezes. She has rhonchi. Abdominal: Soft. Normal appearance. She exhibits no distension. There is no tenderness. There is no rigidity. Musculoskeletal: Normal range of motion. Neurological: She is alert and oriented to person, place, and time. No cranial nerve deficit. Skin: Skin is warm, dry and intact. Capillary refill takes less than 2 seconds. No rash noted. Psychiatric: Her mood appears anxious. Nursing note and vitals reviewed.       MEDICAL DECISION MAKING    Vitals:    Vitals:    06/06/19 1913 06/06/19 1919 06/06/19 2027 06/06/19 2100   BP:    (!) 120/103   Pulse:    83   Resp: 25 27 18 28   Temp:       TempSrc:       SpO2: 100%   90%   Weight:       Height:           LABS:  Labs Reviewed   CBC WITH AUTO DIFFERENTIAL - Abnormal; Notable for the following components:       Result Value    Hemoglobin 11.0 (*)     Hematocrit 34.2 (*)     RDW 16.3 (*)     Eosinophils # 1.2 (*)     All the below findings:        Interpretation per the Radiologist below, if available at the time of thisnote:    XR CHEST PORTABLE   Final Result   Patchy right basilar opacities which may represent an infectious/inflammatory   process. This could be further evaluated via dedicated PA and lateral views   of the chest when the patient is better able to tolerate imaging. No results found. MEDICAL DECISION MAKING / ED COURSE:      CRITICAL CARE NOTE:  There was a high probability of clinically significant life-threatening deterioration of the patient's condition requiring my urgent intervention. Total critical care time was at least 40 minutes. This includes vital sign monitoring, pulse oximetry monitoring, telemetry monitoring, clinical response to the IV medications, reviewing the nursing notes, consultation time, dictation/documentation time, and interpretation of the labwork. This excludes any separately billable procedures performed. PROCEDURES:   Procedures    None    Patient was given:  Medications   albuterol (PROVENTIL) nebulizer solution 5 mg (5 mg Nebulization Given 6/6/19 1907)   ipratropium-albuterol (DUONEB) nebulizer solution 1 ampule (1 ampule Inhalation Given 6/6/19 1904)   LORazepam (ATIVAN) injection 1 mg (1 mg Intravenous Given 6/6/19 1926)   0.9 % sodium chloride bolus (1,000 mLs Intravenous New Bag 6/6/19 1934)   methylPREDNISolone sodium (SOLU-MEDROL) injection 125 mg (125 mg Intravenous Given 6/6/19 1940)   cefTRIAXone (ROCEPHIN) 1 g IVPB in 50 mL D5W minibag (0 g Intravenous Stopped 6/6/19 2109)   azithromycin (ZITHROMAX) 500 mg in D5W 250ml addavial (0 mg Intravenous Stopped 6/6/19 2211)       Differential Diagnosis: Acute Coronary Syndrome, Congestive Heart Failure, Myocardial Infarction, Pulmonary Embolus, Pneumonia, Pneumothorax, other    Patient presents very anxious with shortness of breath. She was hypoxic on arrival.  BiPAP place.   Breathing treatments and steroids given. Lactic acid normal.  No leukocytosis. Hemoglobin 11. No electrolyte abnormality other kidney or liver dysfunction. Troponin negative. Pregnancy negative. . VBG shows a pH of 7.27 with a PCO2 of 64. Chest x-ray shows patchy right basilar opacities. Patient be treated with Rocephin and azithromycin to cover for community-acquired pneumonia. She was given Ativan to tolerate the BiPAP which she did very well. She was given fluids. She was resting much more comfortably on my reexam.  She was requesting to take BiPAP off which was taken off. She will be admitted for further workup due to the hypoxia and hypercapnia. She was given all test results and given an opportunity to ask and have any questions answered. She was agreeable to admission. The hospitalist, Dr. Nancy Cooper, was consulted and agreed to admit patient and write orders. The patient tolerated their visit well. I evaluated the patient. The physician was available for consultation as needed. The patient and / or the family were informed of the results of anytests, a time was given to answer questions, a plan was proposed and they agreed with plan. CLINICAL IMPRESSION:  1. Acute respiratory failure with hypoxia and hypercapnia (HCC)    2. Right lower lobe pulmonary infiltrate    3. Chronic obstructive pulmonary disease, unspecified COPD type ([facility] [address]    4.  Cigarette smoker        DISPOSITION Admitted 06/06/2019 08:57:36 PM      PATIENT REFERRED TO:  S[address] [facility]  [phone]            DISCHARGE MEDICATIONS:  New Prescriptions    No medications on file       DISCONTINUED MEDICATIONS:  Discontinued Medications    FUROSEMIDE (LASIX) 20 MG TABLET    Take 1 tablet by mouth daily for 14 days    GARCINIA CAMBOGIA-CHROMIUM PO    Take 2 capsules by mouth daily    THEOPHYLLINE (UNIPHYL) 400 MG EXTENDED RELEASE TABLET    TAKE 1 TABLET BY MOUTH DAILY    UMECLIDINIUM BROMIDE (INCRUSE ELLIPTA) 62.5 MCG/INH AEPB    Inhale 1 puff into the lungs daily    UMECLIDINIUM BROMIDE (INCRUSE ELLIPTA) 62.5 MCG/INH AEPB    Inhale 1 puff into the lungs daily              (Please note the MDM and HPI sections of this note were completed with a voice recognition program.  Efforts weremade to edit the dictations but occasionally words are mis-transcribed.)    Electronically signed, HA Jackson CNP,           HA Jackson CNP  06/06/19 9740

## 2019-06-07 LAB
EKG ATRIAL RATE: 84 BPM
EKG DIAGNOSIS: NORMAL
EKG P AXIS: 82 DEGREES
EKG P-R INTERVAL: 148 MS
EKG Q-T INTERVAL: 370 MS
EKG QRS DURATION: 86 MS
EKG QTC CALCULATION (BAZETT): 437 MS
EKG R AXIS: 74 DEGREES
EKG T AXIS: 78 DEGREES
EKG VENTRICULAR RATE: 84 BPM
HCT VFR BLD CALC: 33.9 % (ref 36–48)
HEMOGLOBIN: 10.9 G/DL (ref 12–16)
MCH RBC QN AUTO: 26.2 PG (ref 26–34)
MCHC RBC AUTO-ENTMCNC: 32.1 G/DL (ref 31–36)
MCV RBC AUTO: 81.6 FL (ref 80–100)
PDW BLD-RTO: 16.6 % (ref 12.4–15.4)
PLATELET # BLD: 194 K/UL (ref 135–450)
PMV BLD AUTO: 8 FL (ref 5–10.5)
PROCALCITONIN: 0.1 NG/ML (ref 0–0.15)
RBC # BLD: 4.16 M/UL (ref 4–5.2)
REPORT: NORMAL
RESPIRATORY PANEL PCR: NORMAL
STREP PNEUMONIAE ANTIGEN, URINE: NORMAL
WBC # BLD: 2.2 K/UL (ref 4–11)

## 2019-06-07 PROCEDURE — 93010 ELECTROCARDIOGRAM REPORT: CPT | Performed by: INTERNAL MEDICINE

## 2019-06-07 PROCEDURE — 2060000000 HC ICU INTERMEDIATE R&B

## 2019-06-07 PROCEDURE — 2700000000 HC OXYGEN THERAPY PER DAY

## 2019-06-07 PROCEDURE — 87040 BLOOD CULTURE FOR BACTERIA: CPT

## 2019-06-07 PROCEDURE — 6370000000 HC RX 637 (ALT 250 FOR IP): Performed by: INTERNAL MEDICINE

## 2019-06-07 PROCEDURE — 6360000002 HC RX W HCPCS: Performed by: INTERNAL MEDICINE

## 2019-06-07 PROCEDURE — 87449 NOS EACH ORGANISM AG IA: CPT

## 2019-06-07 PROCEDURE — 36415 COLL VENOUS BLD VENIPUNCTURE: CPT

## 2019-06-07 PROCEDURE — 87641 MR-STAPH DNA AMP PROBE: CPT

## 2019-06-07 PROCEDURE — 94760 N-INVAS EAR/PLS OXIMETRY 1: CPT

## 2019-06-07 PROCEDURE — 87486 CHLMYD PNEUM DNA AMP PROBE: CPT

## 2019-06-07 PROCEDURE — 99223 1ST HOSP IP/OBS HIGH 75: CPT | Performed by: INTERNAL MEDICINE

## 2019-06-07 PROCEDURE — 87798 DETECT AGENT NOS DNA AMP: CPT

## 2019-06-07 PROCEDURE — 87070 CULTURE OTHR SPECIMN AEROBIC: CPT

## 2019-06-07 PROCEDURE — 2580000003 HC RX 258: Performed by: INTERNAL MEDICINE

## 2019-06-07 PROCEDURE — 85027 COMPLETE CBC AUTOMATED: CPT

## 2019-06-07 PROCEDURE — 84145 PROCALCITONIN (PCT): CPT

## 2019-06-07 PROCEDURE — 94640 AIRWAY INHALATION TREATMENT: CPT

## 2019-06-07 PROCEDURE — 87205 SMEAR GRAM STAIN: CPT

## 2019-06-07 PROCEDURE — 87633 RESP VIRUS 12-25 TARGETS: CPT

## 2019-06-07 PROCEDURE — 87581 M.PNEUMON DNA AMP PROBE: CPT

## 2019-06-07 RX ORDER — ALPRAZOLAM 0.5 MG/1
1 TABLET ORAL 4 TIMES DAILY
Status: DISCONTINUED | OUTPATIENT
Start: 2019-06-07 | End: 2019-06-10 | Stop reason: HOSPADM

## 2019-06-07 RX ORDER — SODIUM CHLORIDE FOR INHALATION 3 %
15 VIAL, NEBULIZER (ML) INHALATION 3 TIMES DAILY
Status: DISCONTINUED | OUTPATIENT
Start: 2019-06-07 | End: 2019-06-10 | Stop reason: HOSPADM

## 2019-06-07 RX ORDER — LACTOBACILLUS RHAMNOSUS GG 10B CELL
2 CAPSULE ORAL 2 TIMES DAILY WITH MEALS
Status: DISCONTINUED | OUTPATIENT
Start: 2019-06-07 | End: 2019-06-10 | Stop reason: HOSPADM

## 2019-06-07 RX ORDER — METHADONE HYDROCHLORIDE 10 MG/1
20 TABLET ORAL 2 TIMES DAILY
Status: DISCONTINUED | OUTPATIENT
Start: 2019-06-07 | End: 2019-06-08

## 2019-06-07 RX ADMIN — MOMETASONE FUROATE AND FORMOTEROL FUMARATE DIHYDRATE 2 PUFF: 100; 5 AEROSOL RESPIRATORY (INHALATION) at 08:07

## 2019-06-07 RX ADMIN — OXYCODONE HYDROCHLORIDE 15 MG: 5 TABLET ORAL at 20:38

## 2019-06-07 RX ADMIN — SODIUM CHLORIDE, PRESERVATIVE FREE 10 ML: 5 INJECTION INTRAVENOUS at 00:22

## 2019-06-07 RX ADMIN — SODIUM CHLORIDE, PRESERVATIVE FREE 10 ML: 5 INJECTION INTRAVENOUS at 20:36

## 2019-06-07 RX ADMIN — VANCOMYCIN HYDROCHLORIDE 1000 MG: 1 INJECTION, POWDER, LYOPHILIZED, FOR SOLUTION INTRAVENOUS at 11:13

## 2019-06-07 RX ADMIN — ALPRAZOLAM 1 MG: 0.5 TABLET ORAL at 20:06

## 2019-06-07 RX ADMIN — METHYLPREDNISOLONE SODIUM SUCCINATE 40 MG: 40 INJECTION, POWDER, FOR SOLUTION INTRAMUSCULAR; INTRAVENOUS at 17:28

## 2019-06-07 RX ADMIN — ALPRAZOLAM 1 MG: 0.5 TABLET ORAL at 13:39

## 2019-06-07 RX ADMIN — AZITHROMYCIN MONOHYDRATE 500 MG: 500 INJECTION, POWDER, LYOPHILIZED, FOR SOLUTION INTRAVENOUS at 21:07

## 2019-06-07 RX ADMIN — ALPRAZOLAM 1 MG: 0.5 TABLET ORAL at 04:10

## 2019-06-07 RX ADMIN — Medication 2 CAPSULE: at 17:28

## 2019-06-07 RX ADMIN — MOMETASONE FUROATE AND FORMOTEROL FUMARATE DIHYDRATE 2 PUFF: 200; 5 AEROSOL RESPIRATORY (INHALATION) at 21:28

## 2019-06-07 RX ADMIN — METHADONE HYDROCHLORIDE 20 MG: 10 TABLET ORAL at 04:10

## 2019-06-07 RX ADMIN — METHYLPREDNISOLONE SODIUM SUCCINATE 40 MG: 40 INJECTION, POWDER, FOR SOLUTION INTRAMUSCULAR; INTRAVENOUS at 00:22

## 2019-06-07 RX ADMIN — IPRATROPIUM BROMIDE AND ALBUTEROL SULFATE 1 AMPULE: .5; 3 SOLUTION RESPIRATORY (INHALATION) at 11:51

## 2019-06-07 RX ADMIN — ALPRAZOLAM 1 MG: 0.5 TABLET ORAL at 08:21

## 2019-06-07 RX ADMIN — OXYCODONE HYDROCHLORIDE 15 MG: 5 TABLET ORAL at 08:21

## 2019-06-07 RX ADMIN — Medication 2 CAPSULE: at 10:58

## 2019-06-07 RX ADMIN — THEOPHYLLINE 200 MG: 400 TABLET, EXTENDED RELEASE ORAL at 08:21

## 2019-06-07 RX ADMIN — METHYLPREDNISOLONE SODIUM SUCCINATE 40 MG: 40 INJECTION, POWDER, FOR SOLUTION INTRAMUSCULAR; INTRAVENOUS at 11:13

## 2019-06-07 RX ADMIN — CEFTRIAXONE 2 G: 2 INJECTION, POWDER, FOR SOLUTION INTRAMUSCULAR; INTRAVENOUS at 20:37

## 2019-06-07 RX ADMIN — METHADONE HYDROCHLORIDE 20 MG: 10 TABLET ORAL at 15:49

## 2019-06-07 RX ADMIN — SODIUM CHLORIDE, PRESERVATIVE FREE 10 ML: 5 INJECTION INTRAVENOUS at 08:24

## 2019-06-07 RX ADMIN — IPRATROPIUM BROMIDE AND ALBUTEROL SULFATE 1 AMPULE: .5; 3 SOLUTION RESPIRATORY (INHALATION) at 08:07

## 2019-06-07 RX ADMIN — IPRATROPIUM BROMIDE AND ALBUTEROL SULFATE 1 AMPULE: .5; 3 SOLUTION RESPIRATORY (INHALATION) at 21:27

## 2019-06-07 RX ADMIN — IPRATROPIUM BROMIDE AND ALBUTEROL SULFATE 1 AMPULE: .5; 3 SOLUTION RESPIRATORY (INHALATION) at 16:36

## 2019-06-07 RX ADMIN — OXYCODONE HYDROCHLORIDE 15 MG: 5 TABLET ORAL at 01:27

## 2019-06-07 RX ADMIN — OXYCODONE HYDROCHLORIDE 15 MG: 5 TABLET ORAL at 14:31

## 2019-06-07 RX ADMIN — METHYLPREDNISOLONE SODIUM SUCCINATE 40 MG: 40 INJECTION, POWDER, FOR SOLUTION INTRAMUSCULAR; INTRAVENOUS at 06:07

## 2019-06-07 ASSESSMENT — PAIN - FUNCTIONAL ASSESSMENT
PAIN_FUNCTIONAL_ASSESSMENT: ACTIVITIES ARE NOT PREVENTED

## 2019-06-07 ASSESSMENT — PAIN DESCRIPTION - PAIN TYPE
TYPE: CHRONIC PAIN

## 2019-06-07 ASSESSMENT — PAIN DESCRIPTION - DESCRIPTORS
DESCRIPTORS: ACHING

## 2019-06-07 ASSESSMENT — PAIN DESCRIPTION - FREQUENCY
FREQUENCY: CONTINUOUS

## 2019-06-07 ASSESSMENT — PAIN DESCRIPTION - ORIENTATION
ORIENTATION: RIGHT;LEFT
ORIENTATION: RIGHT;LEFT
ORIENTATION: LEFT;RIGHT
ORIENTATION: LEFT;RIGHT
ORIENTATION: RIGHT;LEFT
ORIENTATION: RIGHT;LEFT

## 2019-06-07 ASSESSMENT — PAIN DESCRIPTION - LOCATION
LOCATION: KNEE;LEG
LOCATION: KNEE;LEG
LOCATION: KNEE
LOCATION: KNEE;LEG

## 2019-06-07 ASSESSMENT — PAIN SCALES - GENERAL
PAINLEVEL_OUTOF10: 7
PAINLEVEL_OUTOF10: 9
PAINLEVEL_OUTOF10: 6
PAINLEVEL_OUTOF10: 0
PAINLEVEL_OUTOF10: 9
PAINLEVEL_OUTOF10: 6
PAINLEVEL_OUTOF10: 8
PAINLEVEL_OUTOF10: 0
PAINLEVEL_OUTOF10: 6
PAINLEVEL_OUTOF10: 8
PAINLEVEL_OUTOF10: 4
PAINLEVEL_OUTOF10: 0
PAINLEVEL_OUTOF10: 8

## 2019-06-07 ASSESSMENT — PAIN DESCRIPTION - ONSET
ONSET: ON-GOING

## 2019-06-07 ASSESSMENT — PAIN DESCRIPTION - PROGRESSION
CLINICAL_PROGRESSION: NOT CHANGED

## 2019-06-07 NOTE — CARE COORDINATION
INITIAL CASE MANAGEMENT ASSESSMENT    Reviewed chart, met with patient to assess possible discharge needs. Explained Case Management role/services. Living Situation: Patient lives with her parents and dtg     ADLs: Independent     DME: O2 through [facility], nebulizer, wheelchair, RW, cane    PT/OT Recs: None ordered     Active Services: None     Transportation: Active . Reports family drives and will transport at discharge     Medications: No barriers to taking/obtaining medications    PCP: Christina Singleton      HD/PD: N/A    PLAN/COMMENTS: No needs identified at this time    SW/CM provided contact information for patient or family to call with any questions. SW/CM will follow and assist as needed.       Electronically signed by SONY Rubi on 6/7/2019 at 3:38 PM

## 2019-06-07 NOTE — H&P
Hospital Medicine History & Physical      PCP: Jane Aiken    Date of Admission: 2019    Date of Service: Pt seen/examined on 19 and Admitted to Inpatient with expected LOS greater than two midnights due to medical therapy. .    Chief Complaint:  sob      History Of Present Illness:    46 y.o. female who presented to [facility] [place] with increasing shortness of breath. Patient reports cough productive of sputum. She's been using inhalers at home with no improvement of her symptoms. Her breathing got so bad that she finally decided to come to the emergency room for care. She was noted to be in moderate distress initially and was placed on BiPAP but has turned around quite nicely. At the time of my assessment in her hospital room she is able to speak in complete sentences. She has not been to the hospital for several months. Patient follows up with Dr. Bettina Krishnamurthy in pulmonary medicine and recently saw her several weeks ago. Past Medical History:          Diagnosis Date    Anxiety and depression     Arthritis     Asthma     Chronic pain syndrome     Complication of joint prosthesis (HCC)     COPD (chronic obstructive pulmonary disease) (HCC)     Failed back surgical syndrome     Failed neck syndrome     Fibromyalgia     GERD (gastroesophageal reflux disease)     Influenza A 2019    MDRO (multiple drug resistant organisms) resistance 2018    sputum    Obesity (BMI 30-39. 9)     Pneumonia        Past Surgical History:          Procedure Laterality Date    ABDOMEN SURGERY      BACK SURGERY       SECTION      x2    ENDOSCOPY, COLON, DIAGNOSTIC      HERNIA REPAIR      JOINT REPLACEMENT      right knee    KNEE ARTHROCENTESIS      removal of bilateral knees    SPINE SURGERY      5  back surgeries     UPPER GASTROINTESTINAL ENDOSCOPY  2015       Medications Prior to Admission:      Prior to Admission medications    Medication Sig Start Date End Date Taking? Authorizing Provider   predniSONE (DELTASONE) 20 MG tablet Take 40 mg daily for 5 days, then 20 mg daily for 5 days, then 10 mg daily for 4 days, then stop 5/8/19  Yes Cinthia Alejandre MD   pseudoephedrine-guaiFENesin [facility] D)  MG per extended release tablet Take 2 tablets by mouth every 12 hours 5/8/19  Yes Cinthia Alejandre MD   ipratropium-albuterol (DUONEB) 0.5-2.5 (3) MG/3ML SOLN nebulizer solution Take 3 mLs by nebulization every 4 hours as needed for Shortness of Breath 2/27/19  Yes Cinthia Alejandre MD   albuterol sulfate  (90 Base) MCG/ACT inhaler INHALE 2 PUFFS INTO THE LUNGS EVERY 4 HOURS AS NEEDED FOR WHEEZING OR SHORTNESS OF BREATH 2/27/19  Yes Cinthia Alejandre MD   zolpidem (AMBIEN) 5 MG tablet Take 5 mg by mouth nightly as needed for Sleep. .   Yes Historical Provider, MD   ibuprofen (ADVIL;MOTRIN) 600 MG tablet Take 600 mg by mouth every 8 hours as needed    Yes Historical Provider, MD   budesonide-formoterol (SYMBICORT) 160-4.5 MCG/ACT AERO INHALE TWO PUFFS BY MOUTH TWICE A DAY -- MUST BE SEEN for future refills 1/22/19  Yes Cinthia Alejandre MD   oxymetazoline (AFRIN) 0.05 % nasal spray 2 sprays by Nasal route 2 times daily as needed for Congestion   Yes Historical Provider, MD   ALPRAZolam Babita Ryan) 1 MG tablet Take 1 mg by mouth 4 times daily. 0400, 0800, 1400, 2000. Yes Historical Provider, MD   oxyCODONE (OXY-IR) 15 MG immediate release tablet Take 15 mg by mouth every 4 hours as needed for Pain   Yes Historical Provider, MD   methadone (DOLOPHINE) 10 MG tablet Take by mouth every morning. 30mg at 0400, 20mg at 1600.    Yes Historical Provider, MD   fluticasone (FLONASE) 50 MCG/ACT nasal spray 1 spray by Nasal route nightly    Yes Historical Provider, MD   theophylline (DARCY-24) 200 MG extended release capsule Take 1 capsule by mouth daily  Patient not taking: Reported on 6/6/2019 5/8/19 6/7/19  Cinthia Alejandre MD       Allergies:  Gabapentin; Penicillins; Morphine; and Morphine and related    Social History:      The patient currently lives  w family    TOBACCO:   reports that she has never smoked. She has never used smokeless tobacco.  ETOH:   reports that she does not drink alcohol. Family History:      Reviewed in detail and negative for DM, CAD, Cancer, CVA. Positive as follows:        Problem Relation Age of Onset    COPD Mother     Asthma Father        REVIEW OF SYSTEMS:   Pertinent positives as noted in the HPI. All other systems reviewed and negative. PHYSICAL EXAM PERFORMED:    BP (!) 144/97   Pulse 71   Temp 97.8 °F (36.6 °C) (Oral)   Resp 22   Ht 5' (1.524 m)   Wt 173 lb 1 oz (78.5 kg)   SpO2 98%   BMI 33.80 kg/m²     General appearance:  No apparent distress, appears stated age and cooperative. Mildly dyspneic  HEENT:  Normal cephalic, atraumatic without obvious deformity. Pupils equal, round, and reactive to light. Extra ocular muscles intact. Conjunctivae/corneas clear. Neck: Supple, with full range of motion. No jugular venous distention. Trachea midline. Respiratory:  Diffuse exp wheezes r >L  Cardiovascular:  Regular rate and rhythm with normal S1/S2 without murmurs, rubs or gallops. Abdomen: Soft, non-tender, non-distended with normal bowel sounds. Musculoskeletal:  No clubbing, cyanosis or edema bilaterally. Full range of motion without deformity. Skin: Skin color, texture, turgor normal.  No rashes or lesions. Neurologic:  Neurovascularly intact without any focal sensory/motor deficits.  Cranial nerves: II-XII intact, grossly non-focal.  Psychiatric:  Alert and oriented, thought content appropriate, normal insight  Capillary Refill: Brisk,< 3 seconds   Peripheral Pulses: +2 palpable, equal bilaterally       Labs:     Recent Labs     06/06/19 1919   WBC 6.8   HGB 11.0*   HCT 34.2*        Recent Labs     06/06/19 1919      K 4.0      CO2 27   BUN 15   CREATININE 0.7   CALCIUM 8.7     Recent Labs     06/06/19 1919

## 2019-06-07 NOTE — CONSULTS
PATIENT IS SEEN AT THE REQUEST OF DR. Rina Huynh for acute on chronic respiratory failure    CONSULTING PHYSICIAN: Anshul    HISTORY OF PRESENT ILLNESS:  This is a 46 y.o. female who presented to the ED on  with a CC of SOB. Per ED notes she has been coughing and SOB. Noted to be hypoxic in the ED and was put on BIPAP at her request because she feared intubation. She has been much more SOB recently with cough but not able to get up anything. She was on prednisone burst for 2 weeks that only minimally helped her. She is better today but still SOB . Established Pulmonologist:  Dino    PAST MEDICAL HISTORY:  Past Medical History:   Diagnosis Date    Anxiety and depression     Arthritis     Asthma     Chronic pain syndrome     Complication of joint prosthesis ([address]     COPD (chronic obstructive pulmonary disease) (HCC)     Failed back surgical syndrome     Failed neck syndrome     Fibromyalgia     GERD (gastroesophageal reflux disease)     Influenza A 2019    MDRO (multiple drug resistant organisms) resistance 2018    sputum    Obesity (BMI 30-39. 9)     Pneumonia        PAST SURGICAL HISTORY:  Past Surgical History:   Procedure Laterality Date    ABDOMEN SURGERY      BACK SURGERY       SECTION      x2    ENDOSCOPY, COLON, DIAGNOSTIC      HERNIA REPAIR      JOINT REPLACEMENT      right knee    KNEE ARTHROCENTESIS      removal of bilateral knees    SPINE SURGERY      5  back surgeries     UPPER GASTROINTESTINAL ENDOSCOPY  2015       FAMILY HISTORY:  family history includes Asthma in her father; COPD in her mother. SOCIAL HISTORY:   reports that she has never smoked.  She has never used smokeless tobacco.    Scheduled Meds:   ALPRAZolam  1 mg Oral 4x Daily    methadone  20 mg Oral BID    sodium chloride flush  10 mL Intravenous 2 times per day    enoxaparin  40 mg Subcutaneous Daily    mometasone-formoterol  2 puff Inhalation BID    theophylline  200 mg Oral Daily    ipratropium-albuterol  1 ampule Inhalation Q4H WA    methylPREDNISolone  40 mg Intravenous Q6H    Followed by   Davida Rivas ON 6/9/2019] predniSONE  40 mg Oral Daily    cefTRIAXone (ROCEPHIN) IV  1 g Intravenous Q24H    azithromycin  500 mg Intravenous Q24H       Continuous Infusions:      PRN Meds:  oxyCODONE, sodium chloride flush, magnesium hydroxide, ondansetron, ipratropium-albuterol, acetaminophen    ALLERGIES:  Patient is allergic to gabapentin; penicillins; morphine; and morphine and related. REVIEW OF SYSTEMS:  Constitutional: Negative for fever or chills  HENT: Negative for sore throat  Eyes: Negative for redness   Respiratory: More SOB, coughing without mucus   Cardiovascular: Negative for chest pain  Gastrointestinal: Negative for vomiting, diarrhea   Genitourinary: Negative for hematuria, negative for dysuria  Musculoskeletal: Negative for arthralgias   Skin: Negative for rash  Neurological: Negative for syncope  Hematological: Negative for adenopathy  Extremities:  Negative for swelling    PHYSICAL EXAM:  Blood pressure (!) 145/96, pulse 80, temperature 98.4 °F (36.9 °C), temperature source Oral, resp. rate 22, height 5' (1.524 m), weight 168 lb 14 oz (76.6 kg), SpO2 92 %, not currently breastfeeding.'  Gen: Anxious  Eyes: PERRL. No sclera icterus. No conjunctival injection. ENT: No discharge. Pharynx clear. Raspy voice  Neck: Trachea midline. No obvious mass. Resp: Wheezing some of which is forced  CV: Regular rate. Regular rhythm. No murmur or rub. GI: Non-tender. Non-distended. No hernia. BS present. Skin: Warm and dry. No nodule on exposed extremities. Lymph: No cervical LAD. No supraclavicular LAD. M/S: No cyanosis. No joint deformity. Neuro: Awake. Alert. Moves all four extremities.    EXT:   trace edema, no clubbing    LABS:  CBC:   Recent Labs     06/06/19  1919 06/07/19  0615   WBC 6.8 2.2*   HGB 11.0* 10.9*   HCT 34.2* 33.9*   MCV 83.5 81.6    194     BMP: Recent Labs     19      K 4.0      CO2 27   BUN 15   CREATININE 0.7     LIVER PROFILE:   Recent Labs     19   AST 12*   ALT 9*   BILITOT <0.2   ALKPHOS 93     PT/INR: No results for input(s): PROTIME, INR in the last 72 hours. APTT: No results for input(s): APTT in the last 72 hours. UA:No results for input(s): NITRITE, COLORU, PHUR, LABCAST, WBCUA, RBCUA, MUCUS, TRICHOMONAS, YEAST, BACTERIA, CLARITYU, SPECGRAV, LEUKOCYTESUR, UROBILINOGEN, BILIRUBINUR, BLOODU, GLUCOSEU, AMORPHOUS in the last 72 hours. Invalid input(s): KETONESU  No results for input(s): PHART, NML0KVO, PO2ART in the last 72 hours. Cultures:  None    PFTs:  Mild obstruction      ECHO:  Nothing recent    AB.27    Chest X-ray:  Chest imaging was reviewed by me and showed interstitial prominence with haziness in the rll which could be breast attenuation    I reviewed all the above labs and studies pertaining to this visit. ASSESSMENT:  ·  Acute Hypoxic Respiratory Failure with saturations less than 90% on room air and hypercapnic with saturations less than 7.35  · Asthma Exacerbation (1200 eosinophils on differential)  · Vocal Cord Dysfunction  · Possible pneumonia  · Severe Anxiety   · Chronic narcotic abuse    PLAN:  · Supply oxygen if saturations less than 90%  · PRN Bipap  · Duonebs q 4 hours  · Increase Dulera to 200 mcg  · Steroids as is  · Add Sodium chloride nebs  · Continue with azithromycin and Rocephin.   Increase Rocephin to 2 grams  · One time dose of Vanco  · Cultures, antigens  · MRSA probe  · Lactobacillus   · Procalcitonin level   · DVT prophylaxis    Follows with Dr. Divine Westbrook    She does not smoke (ED note says she does)    DO [facility]

## 2019-06-07 NOTE — PLAN OF CARE
Problem: Pain:  Goal: Pain level will decrease  Description  Pain level will decrease  6/7/2019 1042 by Kirti Arteaga RN  Outcome: Ongoing   Pain/discomfort being managed with PRN analgesics per MD orders. Pt able to express presence and absence of pain and rate pain appropriately using numerical scale. Problem: Falls - Risk of:  Goal: Will remain free from falls  Description  Will remain free from falls  6/7/2019 1042 by Kirti Arteaga RN  Outcome: Ongoing   Call light in reach. Side rails up x2. Pt reminded to use call light for assistance getting out of bed if needed. Hourly rounding done to anticipate pt needs.      Problem: Breathing Pattern - Ineffective:  Goal: Ability to achieve and maintain a regular respiratory rate will improve  Description  Ability to achieve and maintain a regular respiratory rate will improve  6/7/2019 1042 by Kirti Arteaga RN  Outcome: Ongoing  Electronically signed by Kirti Arteaga RN on 6/7/2019 at 10:43 AM

## 2019-06-07 NOTE — PLAN OF CARE
Problem: Pain:  Goal: Pain level will decrease  Description  Pain level will decrease  Outcome: Ongoing   Pain meds being reviewed by MD. Pt given all other meds as ordered per MD. Pt educated on non pharmacological methods to decrease pain. Problem: Falls - Risk of:  Goal: Will remain free from falls  Description  Will remain free from falls  Outcome: Ongoing   Patient assessed for fall risk; fall precautions initiated. Patient instructed about safety devices. Environment kept free of clutter and adequate lighting provided. Bed locked and in lowest position. Call light within reach. Will continue to monitor. Problem: Breathing Pattern - Ineffective:  Goal: Ability to achieve and maintain a regular respiratory rate will improve  Description  Ability to achieve and maintain a regular respiratory rate will improve  Outcome: Ongoing   Pt educated on optimal breathing technique. Pt also educated on relaxation techniques.

## 2019-06-07 NOTE — PROGRESS NOTES
4 Eyes Skin Assessment     The patient is being assess for  Admission    I agree that 2 RN's have performed a thorough Head to Toe Skin Assessment on the patient. ALL assessment sites listed below have been assessed. Areas assessed by both nurses:   [x]   Head, Face, and Ears   [x]   Shoulders, Back, and Chest  [x]   Arms, Elbows, and Hands   [x]   Coccyx, Sacrum, and IschIum  [x]   Legs, Feet, and Heels        Does the Patient have Skin Breakdown?   No         Ramana Prevention initiated:  NA   Wound Care Orders initiated:  NA      [facility] nurse consulted for Pressure Injury (Stage 3,4, Unstageable, DTI, NWPT, and Complex wounds), New and Established Ostomies:  NA      Nurse 1 eSignature: Electronically signed by Tiana Martinez RN on 6/7/19 at 12:17 AM    **SHARE this note so that the co-signing nurse is able to place an eSignature**    Nurse 2 eSignature: Electronically signed by Óscar Wallis RN on 6/7/19 at 12:17 AM

## 2019-06-07 NOTE — PROGRESS NOTES
Hospitalist Progress Note      PCP: Vidal Curtis    Date of Admission: 6/6/2019        Subjective: feels better, no chest pain, improved SOB, still coughing. Medications:  Reviewed    Infusion Medications   Scheduled Medications    ALPRAZolam  1 mg Oral 4x Daily    methadone  20 mg Oral BID    cefTRIAXone (ROCEPHIN) IV  2 g Intravenous Q24H    mometasone-formoterol  2 puff Inhalation BID    sodium chloride (Inhalant)  15 mL Nebulization TID    vancomycin  1,000 mg Intravenous Once    lactobacillus  2 capsule Oral BID WC    sodium chloride flush  10 mL Intravenous 2 times per day    enoxaparin  40 mg Subcutaneous Daily    theophylline  200 mg Oral Daily    ipratropium-albuterol  1 ampule Inhalation Q4H WA    methylPREDNISolone  40 mg Intravenous Q6H    Followed by   Melony Tello ON 6/9/2019] predniSONE  40 mg Oral Daily    azithromycin  500 mg Intravenous Q24H     PRN Meds: oxyCODONE, sodium chloride flush, magnesium hydroxide, ondansetron, ipratropium-albuterol, acetaminophen      Intake/Output Summary (Last 24 hours) at 6/7/2019 1210  Last data filed at 6/7/2019 1007  Gross per 24 hour   Intake 890 ml   Output 1100 ml   Net -210 ml       Physical Exam Performed:    /84   Pulse 85   Temp 98.3 °F (36.8 °C) (Oral)   Resp 20   Ht 5' (1.524 m)   Wt 168 lb 14 oz (76.6 kg)   SpO2 96%   BMI 32.98 kg/m²     General appearance: No apparent distress  Neck: Supple  Respiratory:  Expiratory wheezes   Cardiovascular: Regular rate and rhythm with normal S1/S2 without murmurs, rubs or gallops. Abdomen: Soft, non-tender  Musculoskeletal: No clubbing  Skin: Skin color, texture, turgor normal.  No rashes or lesions.   Neurologic:  No focal weakness   Psychiatric: Alert and oriented  Capillary Refill: Brisk,< 3 seconds   Peripheral Pulses: +2 palpable, equal bilaterally       Labs:   Recent Labs     06/06/19  1919 06/07/19  0615   WBC 6.8 2.2*   HGB 11.0* 10.9*   HCT 34.2* 33.9*    194 Recent Labs     06/06/19 1919      K 4.0      CO2 27   BUN 15   CREATININE 0.7   CALCIUM 8.7     Recent Labs     06/06/19 1919   AST 12*   ALT 9*   BILITOT <0.2   ALKPHOS 93     No results for input(s): INR in the last 72 hours. Recent Labs     06/06/19 1919   TROPONINI <0.01       Urinalysis:      Lab Results   Component Value Date    NITRU Negative 04/27/2018    WBCUA 105 04/27/2018    BACTERIA 2+ 04/27/2018    RBCUA 5 04/27/2018    BLOODU SMALL 04/27/2018    SPECGRAV 1.019 04/27/2018    GLUCOSEU Negative 04/27/2018    GLUCOSEU NEGATIVE 06/02/2010       Radiology:  XR CHEST PORTABLE   Final Result   Patchy right basilar opacities which may represent an infectious/inflammatory   process. This could be further evaluated via dedicated PA and lateral views   of the chest when the patient is better able to tolerate imaging. Assessment/Plan:    Active Hospital Problems    Diagnosis    Acute respiratory failure with hypoxia ([facility] [address] [J96.01]     1. Acute on chronic respiratory failure with hypoxia irving to pneumonia and asthma exacerbation, oxygen, duo nebs, pulmonary consulted. 2. Pneumonia, iv antibiotics  3. Asthma exacerbation, steroids as ordered.   4. Chronic pain, pain management       Diet: DIET LOW SODIUM 2 GM;  Code Status: Full Code        Dispo - ongoing management     Dana Keller MD

## 2019-06-07 NOTE — PROGRESS NOTES
Pharmacy Medication Reconciliation Note     List of medications patient is currently taking is complete. Source of information:   1. Conversation with patient at bedside. 2. Epic records. ALLERGY  Gabapentin; Penicillins; Morphine; and Morphine and related    Notes regarding home medications:   1. Patient reports taking her regular morning and afternoon medications today prior to arrival to ED. 2. Patient denies any herbal medications.      Johana Acuña, Pharmacy Intern  6/6/2019  8:52 PM

## 2019-06-08 LAB
L. PNEUMOPHILA SEROGP 1 UR AG: NORMAL
MRSA SCREEN RT-PCR: NORMAL

## 2019-06-08 PROCEDURE — 94761 N-INVAS EAR/PLS OXIMETRY MLT: CPT

## 2019-06-08 PROCEDURE — 2700000000 HC OXYGEN THERAPY PER DAY

## 2019-06-08 PROCEDURE — 2580000003 HC RX 258: Performed by: INTERNAL MEDICINE

## 2019-06-08 PROCEDURE — 2060000000 HC ICU INTERMEDIATE R&B

## 2019-06-08 PROCEDURE — 6360000002 HC RX W HCPCS: Performed by: INTERNAL MEDICINE

## 2019-06-08 PROCEDURE — 99232 SBSQ HOSP IP/OBS MODERATE 35: CPT | Performed by: INTERNAL MEDICINE

## 2019-06-08 PROCEDURE — 94640 AIRWAY INHALATION TREATMENT: CPT

## 2019-06-08 PROCEDURE — 6370000000 HC RX 637 (ALT 250 FOR IP): Performed by: INTERNAL MEDICINE

## 2019-06-08 PROCEDURE — 6370000000 HC RX 637 (ALT 250 FOR IP): Performed by: NURSE PRACTITIONER

## 2019-06-08 RX ORDER — METHADONE HYDROCHLORIDE 10 MG/1
30 TABLET ORAL DAILY
Status: DISCONTINUED | OUTPATIENT
Start: 2019-06-08 | End: 2019-06-10 | Stop reason: HOSPADM

## 2019-06-08 RX ORDER — METHADONE HYDROCHLORIDE 10 MG/1
20 TABLET ORAL DAILY
Status: DISCONTINUED | OUTPATIENT
Start: 2019-06-08 | End: 2019-06-10 | Stop reason: HOSPADM

## 2019-06-08 RX ADMIN — Medication 2 CAPSULE: at 08:14

## 2019-06-08 RX ADMIN — IPRATROPIUM BROMIDE AND ALBUTEROL SULFATE 1 AMPULE: .5; 3 SOLUTION RESPIRATORY (INHALATION) at 08:42

## 2019-06-08 RX ADMIN — METHYLPREDNISOLONE SODIUM SUCCINATE 40 MG: 40 INJECTION, POWDER, FOR SOLUTION INTRAMUSCULAR; INTRAVENOUS at 05:30

## 2019-06-08 RX ADMIN — METHADONE HYDROCHLORIDE 30 MG: 10 TABLET ORAL at 03:34

## 2019-06-08 RX ADMIN — IPRATROPIUM BROMIDE AND ALBUTEROL SULFATE 1 AMPULE: .5; 3 SOLUTION RESPIRATORY (INHALATION) at 20:22

## 2019-06-08 RX ADMIN — CEFTRIAXONE 2 G: 2 INJECTION, POWDER, FOR SOLUTION INTRAMUSCULAR; INTRAVENOUS at 20:15

## 2019-06-08 RX ADMIN — IPRATROPIUM BROMIDE AND ALBUTEROL SULFATE 1 AMPULE: .5; 3 SOLUTION RESPIRATORY (INHALATION) at 16:17

## 2019-06-08 RX ADMIN — AZITHROMYCIN MONOHYDRATE 500 MG: 500 INJECTION, POWDER, LYOPHILIZED, FOR SOLUTION INTRAVENOUS at 20:25

## 2019-06-08 RX ADMIN — METHYLPREDNISOLONE SODIUM SUCCINATE 40 MG: 40 INJECTION, POWDER, FOR SOLUTION INTRAMUSCULAR; INTRAVENOUS at 00:00

## 2019-06-08 RX ADMIN — OXYCODONE HYDROCHLORIDE 15 MG: 5 TABLET ORAL at 02:41

## 2019-06-08 RX ADMIN — IPRATROPIUM BROMIDE AND ALBUTEROL SULFATE 1 AMPULE: .5; 3 SOLUTION RESPIRATORY (INHALATION) at 12:55

## 2019-06-08 RX ADMIN — ALPRAZOLAM 1 MG: 0.5 TABLET ORAL at 14:02

## 2019-06-08 RX ADMIN — SODIUM CHLORIDE, PRESERVATIVE FREE 10 ML: 5 INJECTION INTRAVENOUS at 08:14

## 2019-06-08 RX ADMIN — MOMETASONE FUROATE AND FORMOTEROL FUMARATE DIHYDRATE 2 PUFF: 200; 5 AEROSOL RESPIRATORY (INHALATION) at 08:42

## 2019-06-08 RX ADMIN — SODIUM CHLORIDE, PRESERVATIVE FREE 10 ML: 5 INJECTION INTRAVENOUS at 20:25

## 2019-06-08 RX ADMIN — METHADONE HYDROCHLORIDE 20 MG: 10 TABLET ORAL at 16:35

## 2019-06-08 RX ADMIN — OXYCODONE HYDROCHLORIDE 15 MG: 5 TABLET ORAL at 15:35

## 2019-06-08 RX ADMIN — METHYLPREDNISOLONE SODIUM SUCCINATE 40 MG: 40 INJECTION, POWDER, FOR SOLUTION INTRAMUSCULAR; INTRAVENOUS at 18:30

## 2019-06-08 RX ADMIN — MOMETASONE FUROATE AND FORMOTEROL FUMARATE DIHYDRATE 2 PUFF: 200; 5 AEROSOL RESPIRATORY (INHALATION) at 20:22

## 2019-06-08 RX ADMIN — ALPRAZOLAM 1 MG: 0.5 TABLET ORAL at 08:14

## 2019-06-08 RX ADMIN — THEOPHYLLINE 200 MG: 400 TABLET, EXTENDED RELEASE ORAL at 08:13

## 2019-06-08 RX ADMIN — METHYLPREDNISOLONE SODIUM SUCCINATE 40 MG: 40 INJECTION, POWDER, FOR SOLUTION INTRAMUSCULAR; INTRAVENOUS at 10:53

## 2019-06-08 RX ADMIN — Medication 2 CAPSULE: at 16:36

## 2019-06-08 RX ADMIN — OXYCODONE HYDROCHLORIDE 15 MG: 5 TABLET ORAL at 09:21

## 2019-06-08 RX ADMIN — OXYCODONE HYDROCHLORIDE 15 MG: 5 TABLET ORAL at 21:54

## 2019-06-08 RX ADMIN — ALPRAZOLAM 1 MG: 0.5 TABLET ORAL at 20:15

## 2019-06-08 RX ADMIN — ALPRAZOLAM 1 MG: 0.5 TABLET ORAL at 03:33

## 2019-06-08 ASSESSMENT — PAIN DESCRIPTION - ONSET
ONSET: ON-GOING
ONSET: PROGRESSIVE
ONSET: ON-GOING
ONSET: PROGRESSIVE
ONSET: PROGRESSIVE
ONSET: ON-GOING

## 2019-06-08 ASSESSMENT — PAIN DESCRIPTION - LOCATION
LOCATION: KNEE;LEG
LOCATION: KNEE;LEG
LOCATION: LEG
LOCATION: KNEE;LEG
LOCATION: KNEE;LEG
LOCATION: LEG;KNEE
LOCATION: KNEE;LEG

## 2019-06-08 ASSESSMENT — PAIN SCALES - GENERAL
PAINLEVEL_OUTOF10: 4
PAINLEVEL_OUTOF10: 8
PAINLEVEL_OUTOF10: 4
PAINLEVEL_OUTOF10: 9
PAINLEVEL_OUTOF10: 0
PAINLEVEL_OUTOF10: 8
PAINLEVEL_OUTOF10: 4
PAINLEVEL_OUTOF10: 4
PAINLEVEL_OUTOF10: 8
PAINLEVEL_OUTOF10: 5
PAINLEVEL_OUTOF10: 7
PAINLEVEL_OUTOF10: 7
PAINLEVEL_OUTOF10: 8

## 2019-06-08 ASSESSMENT — PAIN DESCRIPTION - PAIN TYPE
TYPE: CHRONIC PAIN

## 2019-06-08 ASSESSMENT — PAIN DESCRIPTION - FREQUENCY
FREQUENCY: CONTINUOUS

## 2019-06-08 ASSESSMENT — PAIN DESCRIPTION - DESCRIPTORS
DESCRIPTORS: ACHING

## 2019-06-08 ASSESSMENT — PAIN DESCRIPTION - ORIENTATION
ORIENTATION: RIGHT;LEFT

## 2019-06-08 ASSESSMENT — PAIN - FUNCTIONAL ASSESSMENT
PAIN_FUNCTIONAL_ASSESSMENT: ACTIVITIES ARE NOT PREVENTED

## 2019-06-08 ASSESSMENT — PAIN DESCRIPTION - PROGRESSION
CLINICAL_PROGRESSION: NOT CHANGED
CLINICAL_PROGRESSION: NOT CHANGED

## 2019-06-08 NOTE — PROGRESS NOTES
Pt upset her Methadone dose was in correct. . I verified her dose per OARRS.  I will edit her dosage to be Methadone 20mg 4pm amd 30mg at 4 am

## 2019-06-08 NOTE — PLAN OF CARE
Problem: Pain:  Goal: Pain level will decrease  Description  Pain level will decrease  6/7/2019 2311 by Keila Vila RN  Outcome: Ongoing     Problem: Pain:  Goal: Control of acute pain  Description  Control of acute pain  6/7/2019 2311 by Keila Vila RN  Outcome: Ongoing     Problem: Pain:  Goal: Control of chronic pain  Description  Control of chronic pain  6/7/2019 2311 by Keila Vila RN  Outcome: Ongoing     Problem: Falls - Risk of:  Goal: Will remain free from falls  Description  Will remain free from falls  6/7/2019 2311 by Keila Vila RN  Outcome: Ongoing     Problem: Falls - Risk of:  Goal: Absence of physical injury  Description  Absence of physical injury  6/7/2019 2311 by Keila Vila RN  Outcome: Ongoing     Problem:  Activity Intolerance:  Goal: Ability to tolerate increased activity will improve  Description  Ability to tolerate increased activity will improve  6/7/2019 2311 by Keila Vila RN  Outcome: Ongoing     Problem: Breathing Pattern - Ineffective:  Goal: Ability to achieve and maintain a regular respiratory rate will improve  Description  Ability to achieve and maintain a regular respiratory rate will improve  6/7/2019 2311 by Keila Vila RN  Outcome: Ongoing

## 2019-06-08 NOTE — PROGRESS NOTES
Pulmonary Progress Note    Date of Admission: 6/6/2019   LOS: 2 days     Chief Complaint   Patient presents with    Shortness of Breath       Assessment:     Acute Hypoxemic Respiratory Failure with SAO2 <90% on Room Air  Asthma Exacerbation, Acute  Moderate Persistent Asthma  Vocal Cord Dysfunction  Anxiety    Plan:      -Wean supplemental oxygen to goal saturation of >90%  -BIpap as needed  -continue duonebs and dulera  -cont steroids  On azithromycin/ceftriaxone x 5 days  -REsp cx, RVP and legionella s.pneumo ags all negative      24 Hour Events/Subjective  NO acute event o/n. Pt states her dyspne is improving, but still with coughing paroxysms and stress incontinence    ROS:   No nausea  No Vomiting  No chest pain      Intake/Output Summary (Last 24 hours) at 6/8/2019 1151  Last data filed at 6/8/2019 0459  Gross per 24 hour   Intake 780 ml   Output 1775 ml   Net -995 ml         PHYSICAL EXAM:   Blood pressure 138/85, pulse 78, temperature 98.2 °F (36.8 °C), temperature source Oral, resp. rate 16, height 5' (1.524 m), weight 166 lb 7.2 oz (75.5 kg), SpO2 97 %, not currently breastfeeding.'  Gen:  No acute distress. Eyes: PERRL. Anicteric sclera. No conjunctival injection. ENT: No discharge. Posterior oropharynx clear. External appearance of ears and nose normal.  Neck: Trachea midline. No mass   Resp:  No crackles. +wheezes. Diminished air movement throughout. No rhonchi. No dullness on percussion. CV: Regular rate. Regular rhythm. No murmur or rub. No edema. GI: Soft, Non-tender. Non-distended. +BS  Skin: Warm, dry, w/o erythema. Lymph: No cervical or supraclavicular LAD. M/S: No cyanosis. No clubbing. Neuro:  no focal neurologic deficit. Moves all extremities  Psych: Awake and alert, Oriented x 3. Judgement and insight appropriate. Mood stable.       Medications:    Scheduled Meds:   methadone  30 mg Oral Daily    methadone  20 mg Oral Daily    ALPRAZolam  1 mg Oral 4x Daily    cefTRIAXone (ROCEPHIN) IV  2 g Intravenous Q24H    mometasone-formoterol  2 puff Inhalation BID    sodium chloride (Inhalant)  15 mL Nebulization TID    lactobacillus  2 capsule Oral BID WC    sodium chloride flush  10 mL Intravenous 2 times per day    enoxaparin  40 mg Subcutaneous Daily    theophylline  200 mg Oral Daily    ipratropium-albuterol  1 ampule Inhalation Q4H WA    methylPREDNISolone  40 mg Intravenous Q6H    Followed by   Toño Salt ON 6/9/2019] predniSONE  40 mg Oral Daily    azithromycin  500 mg Intravenous Q24H       Continuous Infusions:      PRN Meds:  oxyCODONE, sodium chloride flush, magnesium hydroxide, ondansetron, ipratropium-albuterol, acetaminophen    Labs reviewed:  CBC:   Recent Labs     06/06/19 1919 06/07/19 0615   WBC 6.8 2.2*   HGB 11.0* 10.9*   HCT 34.2* 33.9*   MCV 83.5 81.6    194     BMP:   Recent Labs     06/06/19 1919      K 4.0      CO2 27   BUN 15   CREATININE 0.7     LIVER PROFILE:   Recent Labs     06/06/19 1919   AST 12*   ALT 9*   BILITOT <0.2   ALKPHOS 93     PT/INR: No results for input(s): PROTIME, INR in the last 72 hours. APTT: No results for input(s): APTT in the last 72 hours. UA:No results for input(s): NITRITE, COLORU, PHUR, LABCAST, WBCUA, RBCUA, MUCUS, TRICHOMONAS, YEAST, BACTERIA, CLARITYU, SPECGRAV, LEUKOCYTESUR, UROBILINOGEN, BILIRUBINUR, BLOODU, GLUCOSEU, AMORPHOUS in the last 72 hours. Invalid input(s): Sandra [place]  No results for input(s): PH, PCO2, PO2 in the last 72 hours. Films:  Radiology Review:  Pertinent images / reports were reviewed as a part of this visit. CT Chest w/ contrast: No results found for this or any previous visit. CT Chest w/o contrast: No results found for this or any previous visit. CTPA: No results found for this or any previous visit.     CXR PA/LAT:   Results for orders placed during the hospital encounter of 02/20/19   XR CHEST STANDARD (2 VW)    Narrative EXAMINATION:  TWO VIEWS OF THE CHEST    2/20/2019 4:27 pm    COMPARISON:  01/15/2019    HISTORY:  ORDERING SYSTEM PROVIDED HISTORY: sob  TECHNOLOGIST PROVIDED HISTORY:  Reason for exam:->sob  Ordering Physician Provided Reason for Exam: Abdominal Pain (RLQ)  Acuity: Chronic  Type of Exam: Ongoing    FINDINGS:  The heart and pulmonary vascularity are within normal limits. There are no  focal areas of consolidation or pleural effusion. The osseous structures are  intact. A right internal jugular venous access port is stable. Cervical spine  are wire is unchanged. Impression No acute abnormality         CXR portable:   Results for orders placed during the hospital encounter of 06/06/19   XR CHEST PORTABLE    Narrative EXAMINATION:  ONE XRAY VIEW OF THE CHEST    6/6/2019 7:14 pm    COMPARISON:  Frontal view of the chest 02/21/2019, frontal and lateral views of the chest  02/20/2019. HISTORY:  ORDERING SYSTEM PROVIDED HISTORY: SOB  TECHNOLOGIST PROVIDED HISTORY:  Reason for exam:->SOB  Ordering Physician Provided Reason for Exam: very sob  Acuity: Acute  Type of Exam: Initial    FINDINGS:  Support devices:    Right chest wall port again noted with distal tip terminating in the proximal  right atrium, stable. Stable slightly tortuous appearance of the port  catheter in the interval from 02/21/2019. The cardiopericardial silhouette is stable in size and configuration. There are patchy right basilar opacities which may represent an  infectious/inflammatory process. There is no pneumothorax or sizable pleural effusion. Impression Patchy right basilar opacities which may represent an infectious/inflammatory  process. This could be further evaluated via dedicated PA and lateral views  of the chest when the patient is better able to tolerate imaging.             Thank you for this consult,    Sara Wong [address] [place] [address] Pulmonary, Critical Care, and Sleep Medicine

## 2019-06-08 NOTE — PROGRESS NOTES
34.2* 33.9*    194     Recent Labs     06/06/19 1919      K 4.0      CO2 27   BUN 15   CREATININE 0.7   CALCIUM 8.7     Recent Labs     06/06/19 1919   AST 12*   ALT 9*   BILITOT <0.2   ALKPHOS 93     No results for input(s): INR in the last 72 hours. Recent Labs     06/06/19 1919   TROPONINI <0.01       Urinalysis:      Lab Results   Component Value Date    NITRU Negative 04/27/2018    WBCUA 105 04/27/2018    BACTERIA 2+ 04/27/2018    RBCUA 5 04/27/2018    BLOODU SMALL 04/27/2018    SPECGRAV 1.019 04/27/2018    GLUCOSEU Negative 04/27/2018    GLUCOSEU NEGATIVE 06/02/2010       Radiology:  XR CHEST PORTABLE   Final Result   Patchy right basilar opacities which may represent an infectious/inflammatory   process. This could be further evaluated via dedicated PA and lateral views   of the chest when the patient is better able to tolerate imaging. Assessment/Plan:    Active Hospital Problems    Diagnosis    Acute respiratory failure with hypoxia ([facility] [address] [J96.01]     1. Acute on chronic respiratory failure with hypoxia irving to pneumonia and asthma exacerbation, oxygen, duo nebs, pulmonary consulted. Feels better. 2. Pneumonia, iv antibiotics, afebrile. Still coughing. 3. Asthma exacerbation, steroids as ordered.   4. Chronic pain, pain management           Diet: DIET GENERAL;  Code Status: Full Code        Dispo - hopefully tomorrow home    Taran Silva MD

## 2019-06-09 LAB
BASOPHILS ABSOLUTE: 0 K/UL (ref 0–0.2)
BASOPHILS RELATIVE PERCENT: 0.2 %
CULTURE, RESPIRATORY: NORMAL
EOSINOPHILS ABSOLUTE: 0 K/UL (ref 0–0.6)
EOSINOPHILS RELATIVE PERCENT: 0 %
GRAM STAIN RESULT: NORMAL
HCT VFR BLD CALC: 37.6 % (ref 36–48)
HEMOGLOBIN: 12.3 G/DL (ref 12–16)
LYMPHOCYTES ABSOLUTE: 0.5 K/UL (ref 1–5.1)
LYMPHOCYTES RELATIVE PERCENT: 9.4 %
MCH RBC QN AUTO: 26.7 PG (ref 26–34)
MCHC RBC AUTO-ENTMCNC: 32.7 G/DL (ref 31–36)
MCV RBC AUTO: 81.6 FL (ref 80–100)
MONOCYTES ABSOLUTE: 0.2 K/UL (ref 0–1.3)
MONOCYTES RELATIVE PERCENT: 4 %
NEUTROPHILS ABSOLUTE: 4.6 K/UL (ref 1.7–7.7)
NEUTROPHILS RELATIVE PERCENT: 86.4 %
PDW BLD-RTO: 16.9 % (ref 12.4–15.4)
PLATELET # BLD: 263 K/UL (ref 135–450)
PMV BLD AUTO: 7.3 FL (ref 5–10.5)
RBC # BLD: 4.61 M/UL (ref 4–5.2)
WBC # BLD: 5.3 K/UL (ref 4–11)

## 2019-06-09 PROCEDURE — 85025 COMPLETE CBC W/AUTO DIFF WBC: CPT

## 2019-06-09 PROCEDURE — 6370000000 HC RX 637 (ALT 250 FOR IP): Performed by: NURSE PRACTITIONER

## 2019-06-09 PROCEDURE — 6360000002 HC RX W HCPCS: Performed by: INTERNAL MEDICINE

## 2019-06-09 PROCEDURE — 94760 N-INVAS EAR/PLS OXIMETRY 1: CPT

## 2019-06-09 PROCEDURE — 2580000003 HC RX 258: Performed by: INTERNAL MEDICINE

## 2019-06-09 PROCEDURE — 6370000000 HC RX 637 (ALT 250 FOR IP): Performed by: INTERNAL MEDICINE

## 2019-06-09 PROCEDURE — 2060000000 HC ICU INTERMEDIATE R&B

## 2019-06-09 PROCEDURE — 94640 AIRWAY INHALATION TREATMENT: CPT

## 2019-06-09 RX ADMIN — CEFTRIAXONE 2 G: 2 INJECTION, POWDER, FOR SOLUTION INTRAMUSCULAR; INTRAVENOUS at 20:07

## 2019-06-09 RX ADMIN — IPRATROPIUM BROMIDE AND ALBUTEROL SULFATE 1 AMPULE: .5; 3 SOLUTION RESPIRATORY (INHALATION) at 16:26

## 2019-06-09 RX ADMIN — ALPRAZOLAM 1 MG: 0.5 TABLET ORAL at 08:19

## 2019-06-09 RX ADMIN — AZITHROMYCIN MONOHYDRATE 500 MG: 500 INJECTION, POWDER, LYOPHILIZED, FOR SOLUTION INTRAVENOUS at 20:12

## 2019-06-09 RX ADMIN — IPRATROPIUM BROMIDE AND ALBUTEROL SULFATE 1 AMPULE: .5; 3 SOLUTION RESPIRATORY (INHALATION) at 08:10

## 2019-06-09 RX ADMIN — MOMETASONE FUROATE AND FORMOTEROL FUMARATE DIHYDRATE 2 PUFF: 200; 5 AEROSOL RESPIRATORY (INHALATION) at 08:10

## 2019-06-09 RX ADMIN — ALPRAZOLAM 1 MG: 0.5 TABLET ORAL at 04:04

## 2019-06-09 RX ADMIN — OXYCODONE HYDROCHLORIDE 15 MG: 5 TABLET ORAL at 11:35

## 2019-06-09 RX ADMIN — ALPRAZOLAM 1 MG: 0.5 TABLET ORAL at 20:10

## 2019-06-09 RX ADMIN — METHADONE HYDROCHLORIDE 20 MG: 10 TABLET ORAL at 16:12

## 2019-06-09 RX ADMIN — SODIUM CHLORIDE, PRESERVATIVE FREE 10 ML: 5 INJECTION INTRAVENOUS at 08:22

## 2019-06-09 RX ADMIN — THEOPHYLLINE 200 MG: 400 TABLET, EXTENDED RELEASE ORAL at 08:19

## 2019-06-09 RX ADMIN — Medication 2 CAPSULE: at 08:19

## 2019-06-09 RX ADMIN — ALPRAZOLAM 1 MG: 0.5 TABLET ORAL at 14:17

## 2019-06-09 RX ADMIN — OXYCODONE HYDROCHLORIDE 15 MG: 5 TABLET ORAL at 18:28

## 2019-06-09 RX ADMIN — METHADONE HYDROCHLORIDE 30 MG: 10 TABLET ORAL at 04:04

## 2019-06-09 RX ADMIN — IPRATROPIUM BROMIDE AND ALBUTEROL SULFATE 1 AMPULE: .5; 3 SOLUTION RESPIRATORY (INHALATION) at 20:33

## 2019-06-09 RX ADMIN — OXYCODONE HYDROCHLORIDE 15 MG: 5 TABLET ORAL at 05:09

## 2019-06-09 RX ADMIN — IPRATROPIUM BROMIDE AND ALBUTEROL SULFATE 1 AMPULE: .5; 3 SOLUTION RESPIRATORY (INHALATION) at 12:19

## 2019-06-09 RX ADMIN — Medication 2 CAPSULE: at 16:13

## 2019-06-09 RX ADMIN — MOMETASONE FUROATE AND FORMOTEROL FUMARATE DIHYDRATE 2 PUFF: 200; 5 AEROSOL RESPIRATORY (INHALATION) at 20:34

## 2019-06-09 RX ADMIN — PREDNISONE 40 MG: 20 TABLET ORAL at 08:19

## 2019-06-09 ASSESSMENT — PAIN DESCRIPTION - PAIN TYPE
TYPE: CHRONIC PAIN

## 2019-06-09 ASSESSMENT — PAIN DESCRIPTION - DESCRIPTORS
DESCRIPTORS: ACHING

## 2019-06-09 ASSESSMENT — PAIN SCALES - GENERAL
PAINLEVEL_OUTOF10: 4
PAINLEVEL_OUTOF10: 4
PAINLEVEL_OUTOF10: 6
PAINLEVEL_OUTOF10: 6
PAINLEVEL_OUTOF10: 2
PAINLEVEL_OUTOF10: 7
PAINLEVEL_OUTOF10: 8
PAINLEVEL_OUTOF10: 9
PAINLEVEL_OUTOF10: 4
PAINLEVEL_OUTOF10: 7
PAINLEVEL_OUTOF10: 4
PAINLEVEL_OUTOF10: 0

## 2019-06-09 ASSESSMENT — PAIN DESCRIPTION - ORIENTATION
ORIENTATION: RIGHT;LEFT

## 2019-06-09 ASSESSMENT — PAIN - FUNCTIONAL ASSESSMENT
PAIN_FUNCTIONAL_ASSESSMENT: ACTIVITIES ARE NOT PREVENTED

## 2019-06-09 ASSESSMENT — PAIN DESCRIPTION - ONSET
ONSET: ON-GOING
ONSET: GRADUAL

## 2019-06-09 ASSESSMENT — PAIN DESCRIPTION - LOCATION
LOCATION: KNEE;LEG
LOCATION: KNEE;LEG;FOOT
LOCATION: KNEE;LEG
LOCATION: KNEE;LEG
LOCATION: FOOT;KNEE;LEG
LOCATION: KNEE;LEG

## 2019-06-09 ASSESSMENT — PAIN DESCRIPTION - FREQUENCY
FREQUENCY: CONTINUOUS

## 2019-06-09 ASSESSMENT — PAIN DESCRIPTION - PROGRESSION: CLINICAL_PROGRESSION: GRADUALLY IMPROVING

## 2019-06-09 NOTE — PLAN OF CARE
Problem: Pain:  Goal: Pain level will decrease  Description  Pain level will decrease  Outcome: Ongoing     Problem: Pain:  Goal: Control of acute pain  Description  Control of acute pain  Outcome: Ongoing   Pain/discomfort being managed with PRN analgesics per MD orders. Pt able to express presence and absence of pain and rate pain appropriately using numerical scale. Problem: Falls - Risk of:  Goal: Will remain free from falls  Description  Will remain free from falls  Outcome: Ongoing     Problem: Falls - Risk of:  Goal: Absence of physical injury  Description  Absence of physical injury  Outcome: Ongoing   Fall risk precautions in place. Bed in lowest position with wheels locked,bed alarm in place and activated,non-skid socks on pt, fall risk ID on pt, call light in reach,   Problem: Activity Intolerance:  Goal: Ability to tolerate increased activity will improve  Description  Ability to tolerate increased activity will improve  Outcome: Ongoing     Problem: Breathing Pattern - Ineffective:  Goal: Ability to achieve and maintain a regular respiratory rate will improve  Description  Ability to achieve and maintain a regular respiratory rate will improve  Outcome: Ongoing   Monitor breath sounds, oxygen saturations and requirements and activity tolerance. Administer medications and treatments as ordered.

## 2019-06-09 NOTE — PLAN OF CARE
No new evidence of skin breakdown. Will continue to monitor closely. Patient has been free from falls. Room is free from clutter. . Call light within reach. Calls for assistance appropriately. Pain controlled with ordered pain medications.

## 2019-06-10 ENCOUNTER — TELEPHONE (OUTPATIENT)
Dept: PULMONOLOGY | Age: 49
End: 2019-06-10

## 2019-06-10 VITALS
OXYGEN SATURATION: 94 % | RESPIRATION RATE: 18 BRPM | WEIGHT: 171.3 LBS | HEIGHT: 60 IN | TEMPERATURE: 98.5 F | BODY MASS INDEX: 33.63 KG/M2 | HEART RATE: 84 BPM | DIASTOLIC BLOOD PRESSURE: 83 MMHG | SYSTOLIC BLOOD PRESSURE: 139 MMHG

## 2019-06-10 LAB
CREAT SERPL-MCNC: 0.8 MG/DL (ref 0.6–1.1)
GFR AFRICAN AMERICAN: >60
GFR NON-AFRICAN AMERICAN: >60
PLATELET # BLD: 246 K/UL (ref 135–450)

## 2019-06-10 PROCEDURE — 94640 AIRWAY INHALATION TREATMENT: CPT

## 2019-06-10 PROCEDURE — 99232 SBSQ HOSP IP/OBS MODERATE 35: CPT | Performed by: INTERNAL MEDICINE

## 2019-06-10 PROCEDURE — 6370000000 HC RX 637 (ALT 250 FOR IP): Performed by: INTERNAL MEDICINE

## 2019-06-10 PROCEDURE — 6360000002 HC RX W HCPCS: Performed by: INTERNAL MEDICINE

## 2019-06-10 PROCEDURE — 82565 ASSAY OF CREATININE: CPT

## 2019-06-10 PROCEDURE — 6370000000 HC RX 637 (ALT 250 FOR IP): Performed by: NURSE PRACTITIONER

## 2019-06-10 PROCEDURE — 85049 AUTOMATED PLATELET COUNT: CPT

## 2019-06-10 PROCEDURE — 36592 COLLECT BLOOD FROM PICC: CPT

## 2019-06-10 PROCEDURE — 94761 N-INVAS EAR/PLS OXIMETRY MLT: CPT

## 2019-06-10 RX ORDER — AZITHROMYCIN 500 MG/1
500 TABLET, FILM COATED ORAL
Qty: 3 TABLET | Refills: 0 | Status: SHIPPED | OUTPATIENT
Start: 2019-06-10 | End: 2019-06-13

## 2019-06-10 RX ORDER — PREDNISONE 20 MG/1
TABLET ORAL
Qty: 20 TABLET | Refills: 0 | Status: SHIPPED | OUTPATIENT
Start: 2019-06-10 | End: 2019-06-24

## 2019-06-10 RX ORDER — GUAIFENESIN 600 MG/1
600 TABLET, EXTENDED RELEASE ORAL 2 TIMES DAILY
Qty: 20 TABLET | Refills: 0 | Status: SHIPPED | OUTPATIENT
Start: 2019-06-10 | End: 2019-06-20

## 2019-06-10 RX ORDER — IPRATROPIUM BROMIDE AND ALBUTEROL SULFATE 2.5; .5 MG/3ML; MG/3ML
1 SOLUTION RESPIRATORY (INHALATION) EVERY 4 HOURS PRN
Qty: 360 ML | Refills: 3 | Status: SHIPPED | OUTPATIENT
Start: 2019-06-10

## 2019-06-10 RX ORDER — AZITHROMYCIN 500 MG/1
500 TABLET, FILM COATED ORAL
Status: DISCONTINUED | OUTPATIENT
Start: 2019-06-10 | End: 2019-06-10 | Stop reason: HOSPADM

## 2019-06-10 RX ORDER — GUAIFENESIN 600 MG/1
600 TABLET, EXTENDED RELEASE ORAL 2 TIMES DAILY
Status: DISCONTINUED | OUTPATIENT
Start: 2019-06-10 | End: 2019-06-10 | Stop reason: HOSPADM

## 2019-06-10 RX ADMIN — PREDNISONE 40 MG: 20 TABLET ORAL at 08:32

## 2019-06-10 RX ADMIN — OXYCODONE HYDROCHLORIDE 15 MG: 5 TABLET ORAL at 07:39

## 2019-06-10 RX ADMIN — IPRATROPIUM BROMIDE AND ALBUTEROL SULFATE 1 AMPULE: .5; 3 SOLUTION RESPIRATORY (INHALATION) at 07:30

## 2019-06-10 RX ADMIN — ALPRAZOLAM 1 MG: 0.5 TABLET ORAL at 08:31

## 2019-06-10 RX ADMIN — GUAIFENESIN 600 MG: 600 TABLET ORAL at 10:47

## 2019-06-10 RX ADMIN — MOMETASONE FUROATE AND FORMOTEROL FUMARATE DIHYDRATE 2 PUFF: 200; 5 AEROSOL RESPIRATORY (INHALATION) at 07:40

## 2019-06-10 RX ADMIN — IPRATROPIUM BROMIDE AND ALBUTEROL SULFATE 1 AMPULE: .5; 3 SOLUTION RESPIRATORY (INHALATION) at 11:15

## 2019-06-10 RX ADMIN — THEOPHYLLINE 200 MG: 400 TABLET, EXTENDED RELEASE ORAL at 08:31

## 2019-06-10 RX ADMIN — ALPRAZOLAM 1 MG: 0.5 TABLET ORAL at 03:55

## 2019-06-10 RX ADMIN — OXYCODONE HYDROCHLORIDE 15 MG: 5 TABLET ORAL at 00:58

## 2019-06-10 RX ADMIN — METHADONE HYDROCHLORIDE 30 MG: 10 TABLET ORAL at 03:55

## 2019-06-10 RX ADMIN — Medication 2 CAPSULE: at 08:32

## 2019-06-10 ASSESSMENT — PAIN DESCRIPTION - LOCATION
LOCATION: KNEE

## 2019-06-10 ASSESSMENT — PAIN DESCRIPTION - FREQUENCY
FREQUENCY: CONTINUOUS

## 2019-06-10 ASSESSMENT — PAIN DESCRIPTION - PAIN TYPE
TYPE: CHRONIC PAIN
TYPE: CHRONIC PAIN

## 2019-06-10 ASSESSMENT — PAIN SCALES - GENERAL
PAINLEVEL_OUTOF10: 7
PAINLEVEL_OUTOF10: 4
PAINLEVEL_OUTOF10: 8
PAINLEVEL_OUTOF10: 0
PAINLEVEL_OUTOF10: 8

## 2019-06-10 ASSESSMENT — PAIN DESCRIPTION - DESCRIPTORS
DESCRIPTORS: ACHING
DESCRIPTORS: ACHING

## 2019-06-10 ASSESSMENT — PAIN DESCRIPTION - ORIENTATION
ORIENTATION: RIGHT;LEFT

## 2019-06-10 NOTE — DISCHARGE INSTR - DIET

## 2019-06-10 NOTE — DISCHARGE INSTR - COC
Continuity of Care Form    Patient Name: Michael Fernandezbindkavitha   :  1970  MRN:  [id number]    Admit date:  2019  Discharge date:  ***    Code Status Order: Full Code   Advance Directives:   Advance Care Flowsheet Documentation     Date/Time Healthcare Directive Type of Healthcare Directive Copy in [address] Agent's Name Healthcare Agent's Phone Number    [phone]  No, patient does not have an advance directive for healthcare treatment -- -- -- -- --          Admitting Physician:  Johnnie Rodgers MD  PCP: Aurora Mcpherson    Discharging Nurse: [facility] Unit/Room#: A4H-0317/5127-01  Discharging Unit Phone Number: ***    Emergency Contact:   Extended Emergency Contact Information  Primary Emergency Contact: Driss Sheth, [address] [place] [address] Phone: [phone]  Mobile Phone: [phone]  Relation: Parent  Secondary Emergency Contact: Luis Owens [address] Phone: [phone]  Mobile Phone: [phone]  Relation: Child    Past Surgical History:  Past Surgical History:   Procedure Laterality Date    ABDOMEN SURGERY      BACK SURGERY       SECTION      x2    ENDOSCOPY, COLON, DIAGNOSTIC      HERNIA REPAIR      JOINT REPLACEMENT      right knee    KNEE ARTHROCENTESIS      removal of bilateral knees    SPINE SURGERY      5  back surgeries     UPPER GASTROINTESTINAL ENDOSCOPY  2015       Immunization History:   Immunization History   Administered Date(s) Administered    Influenza Vaccine, unspecified formulation 2006, 2009, 2011, 10/01/2012, 11/10/2013, 2015, 2015, 10/14/2016, 2017    Influenza Virus Vaccine 2015, 10/14/2016, 10/09/2018    Influenza, Kyra Prattyash, 3 Years and older, IM (Fluzone 3 yrs and older or Afluria 5 yrs and older) 10/14/2016    PPD Test 2013, 2013, 2013    Pneumococcal Polysaccharide (Bfbfalkaz89) 01/21/2010, 04/05/2017, 07/07/2017    Pneumococcal Vaccine, unspecified formulation 07/07/2017       Active Problems:  Patient Active Problem List   Diagnosis Code    Chronic pain syndrome G89.4    Anxiety F41.9    Severe persistent asthma with exacerbation J45.51    Acute respiratory failure with hypoxia ([facility]) J96.01    Acute exacerbation of COPD with asthma ([facility] J44.1, J45.901    Chronic diastolic CHF (congestive heart failure) ([facility]) I50.32    HTN (hypertension) I10    Vocal cord dysfunction J38.3    Anxiety F41.9    Chronic infection of sinus J32.9    Moderate persistent asthma without complication D03.52    Anxiety F41.9    Acute respiratory failure with hypoxia and hypercapnia ([facility]) J96.01, J96.02    Severe asthma J45.909    Other allergic rhinitis J30.89    Elevated IgE level R76.8    Lactic acidosis E87.2    Infection due to human metapneumovirus (hMPV) B97.81    Acute on chronic respiratory failure with hypoxia and hypercapnia ([facility]) J96.21, J96.22    Atelectasis J98.11    Chronic respiratory failure with hypoxia ([facility]) J96.11    COPD exacerbation ([facility]) J44.1    Eosinophilia D72.1    Moderate persistent asthma with exacerbation J45.41    Community acquired pneumonia of right lower lobe of lung ([facility] J18.1       Isolation/Infection:   Isolation          No Isolation        Patient Infection Status     Infection Encounter Level?  Onset Date Added Added By Resolved Resolved By Review Date    MDRO (multi-drug resistant organism) No  05/01/18 Deepti Harper RN 01/18/19 Deepti Harper RN     Sputum culture 4/28/18          Nurse Assessment:  Last Vital Signs: /83   Pulse 84   Temp 98.5 °F (36.9 °C) (Oral)   Resp 18   Ht 5' (1.524 m)   Wt 171 lb 4.8 oz (77.7 kg)   SpO2 94%   BMI 33.45 kg/m²     Last documented pain score (0-10 scale): Pain Level: 7  Last Weight:   Wt Readings from Last 1 Encounters:   06/10/19 171 lb 4.8 oz (77.7 kg)     Mental Status:  {IP PT MENTAL STATUS:}    IV Access:  { RIKI IV ACCESS:423386187}    Nursing Mobility/ADLs:  Walking   {[facility] DME YDXB:283283205}  Transfer  {P DME GNGD:902658601}  Bathing  {P DME IXJT:906413923}  Dressing  {P DME AMBC:283969402}  Toileting  {[facility] DME IKYA:254503217}  Feeding  {[facility] DME UURD:965522165}  Med Admin  {[facility] DME WYBE:324198207}  Med Delivery   { RIKI MED Delivery:465337059}    Wound Care Documentation and Therapy:        Elimination:  Continence:   · Bowel: {YES / SZ:21403}  · Bladder: {YES / OC:59060}  Urinary Catheter: {Urinary Catheter:919900589}   Colostomy/Ileostomy/Ileal Conduit: {YES / QD:63999}       Date of Last BM: ***    Intake/Output Summary (Last 24 hours) at 6/10/2019 1242  Last data filed at 6/10/2019 1027  Gross per 24 hour   Intake 1860 ml   Output 1100 ml   Net 760 ml     I/O last 3 completed shifts:   In: 0 [P.O.:1080; IV Piggyback:300]  Out: 80 [Urine:1100]    Safety Concerns:     [employer] Safety Concerns:709559669}    Impairments/Disabilities:      [employer] Impairments/Disabilities:131521547}    Nutrition Therapy:  Current Nutrition Therapy:   [employer] Diet List:533411991}    Routes of Feeding: {[facility] DME Other Feedings:354191548}  Liquids: {Slp liquid thickness:79574}  Daily Fluid Restriction: {P DME Yes amt example:721904487}  Last Modified Barium Swallow with Video (Video Swallowing Test): {Done Not Done GZXL:163730451}    Treatments at the Time of Hospital Discharge:   Respiratory Treatments: ***  Oxygen Therapy:  {Therapy; copd oxygen:45798}  Ventilator:    { CC Vent QZAD:686308687}    Rehab Therapies: {THERAPEUTIC INTERVENTION:9246349119}  Weight Bearing Status/Restrictions: { CC Weight Bearin}  Other Medical Equipment (for information only, NOT a DME order):  {EQUIPMENT:370203603}  Other Treatments: ***    Patient's personal belongings (please select all that are sent with patient):  {AGNES DME Belongings:744731674}    RN SIGNATURE:  {Esignature:806957967}    CASE MANAGEMENT/SOCIAL WORK SECTION    Inpatient Status Date: ***    Readmission Risk Assessment Score:  Readmission Risk              Risk of Unplanned Readmission:        14           Discharging to Facility/ Agency   · Name:   · Address:  · Phone:  · Fax:    Dialysis Facility (if applicable)   · Name:  · Address:  · Dialysis Schedule:  · Phone:  · Fax:    / signature: {Esignature:842579186}    PHYSICIAN SECTION    Prognosis: {Prognosis:5263898297}    Condition at Discharge: [address] [place] [address] Patient Condition:955257784}    Rehab Potential (if transferring to Rehab): {Prognosis:8252804184}    Recommended Labs or Other Treatments After Discharge: ***    Physician Certification: I certify the above information and transfer of Carla Campo  is necessary for the continuing treatment of the diagnosis listed and that she requires {Admit to Appropriate Level of Care:75825} for {GREATER/LESS:713723353} 30 days.      Update Admission H&P: {CHP DME Changes in FKWDQ:623830957}    PHYSICIAN SIGNATURE:  {Esignature:075111248}

## 2019-06-10 NOTE — PROGRESS NOTES
Patient needs to leave before prescriptions given  Her mother has cpome to pick her up and she needs to take care of getting her daughter to driving school this afternoon so she can not wait.   Reviewed with patient current medications and follow up  But is insistent on walking out at this time  Dr. Diana Cutler by perfect serve of patient intent

## 2019-06-10 NOTE — TELEPHONE ENCOUNTER
Patient still in [facility], needs to be seen for O2 recertification. When does she need a HFU appt. Also requesting steroid Rx. See message below.

## 2019-06-10 NOTE — TELEPHONE ENCOUNTER
Patient called in and she is currently in the hospital and wanting to be released and stated that she cant get the attending to come in and release her and she asked if Dr. Mayte Whalen would write her a prescription for Prednisone taper?        Please advise

## 2019-06-10 NOTE — PROGRESS NOTES
Pulmonary Progress Note    CC: Asthma exacerbation  Community acquired pneumonia  Vocal cord dysfunction   Acute hypoxic respiratory failure    Subjective:  She has shortness of breath with activity. She has a non-productive cough. ROS:  +IBARRA  +cough  No vomiting     PHYSICAL EXAM:  Blood pressure 139/83, pulse 84, temperature 98.5 °F (36.9 °C), temperature source Oral, resp. rate 18, height 5' (1.524 m), weight 171 lb 4.8 oz (77.7 kg), SpO2 90 %, not currently breastfeeding. on RA    Intake/Output Summary (Last 24 hours) at 6/10/2019 1113  Last data filed at 6/10/2019 1027  Gross per 24 hour   Intake 1620 ml   Output 1100 ml   Net 520 ml       Gen: Well developed; well nourished  Eyes: No scleral icterus. No conjunctival injection. ENT: External appearance of ears and nose normal.  Neck: Trachea midline. No obvious mass. No visible thyroid enlargement    Respiratory: Expiratory wheezes bilaterally, no accessory muscle use  Cardiovascular: Regular rate and rhythm, no appreciable murmurs. No edema. Skin: Warm and dry. No rashes or ulcers on visible areas. Normal texture and turgor  Musculoskeletal: No cyanosis, clubbing or joint deformity.     Psychiatric: Normal mood and affect; exhibits normal insight and judgement     Medications:  Current Facility-Administered Medications: azithromycin (ZITHROMAX) tablet 500 mg, 500 mg, Oral, Dinner  guaiFENesin (MUCINEX) extended release tablet 600 mg, 600 mg, Oral, BID  methadone (DOLOPHINE) tablet 30 mg, 30 mg, Oral, Daily  methadone (DOLOPHINE) tablet 20 mg, 20 mg, Oral, Daily  ALPRAZolam (XANAX) tablet 1 mg, 1 mg, Oral, 4x Daily  oxyCODONE (ROXICODONE) immediate release tablet 15 mg, 15 mg, Oral, Q6H PRN  cefTRIAXone (ROCEPHIN) 2 g IVPB in D5W 50ml minibag, 2 g, Intravenous, Q24H  mometasone-formoterol (DULERA) 200-5 MCG/ACT inhaler 2 puff, 2 puff, Inhalation, BID **AND** MDI Treatment, , , BID  sodium chloride (Inhalant) 3 % nebulizer solution 15 mL, 15 mL, then 10 mg daily x4 days, then stop  - Dulera and theophylline   - Duonebs every 4 hours  - For discharge please give Symbicort 160/4.5 mcg twice daily, Incruse daily, theophylline 200 mg daily and Duonebs as needed    Community acquired pneumonia  - Ceftriaxone and azithromycin (day #4). Would complete 7 days of antibiotics. Can use doxycycline for discharge. Vocal cord dysfunction   - Alprazolam    Acute hypoxic respiratory failure  - Now on room air      Okay for discharge from pulmonary perspective. Thank you for allowing me to participate in the care of this patient.      Kimberley Epps MD  [facility] Pulmonary, Critical Care and Sleep

## 2019-06-10 NOTE — DISCHARGE SUMMARY
Hospital Medicine Discharge Summary    Patient: Bora Mariscal     Gender: female  : 1970   Age: 46 y.o. MRN: [id number]    Code Status: Full Code     Primary Care Provider: Morena Haley    Admit Date: 2019   Discharge Date:   6/10/2019    Admitting Physician: Tomasa Lowery MD  Discharge Physician: Naye Rodriguez DO     Discharge Diagnoses: Active Hospital Problems    Diagnosis Date Noted    Moderate persistent asthma with exacerbation [J45.41]     Community acquired pneumonia of right lower lobe of lung ([address] [J18.1]     Acute respiratory failure with hypoxia ([facility] [address] [J96.01] 2014       Hospital Course: a 45 yo female admitted with PNA an asthma. She improved and was on RA this am prior to discharge. She was preparing to be discharged, however she left before I was able to see her today, stating that she had to get home to drive her daughter to driving class. She was not examined by myself prior to discharge. She was seen by pulmonology this am.     1. Acute on chronic respiratory failure with hypoxia due to pneumonia and asthma exacerbation, oxygen, duo nebs, pulmonary consulted  - DC home with prednisone taper, theophylline, incruse, symbicort, and azithro  2. Pneumonia, iv antibiotics, afebrile. BCx NGTD. No leukocytosis or fevers. Will complete 5 days of rocephin today  3. Asthma exacerbation, steroids as ordered. 4. Chronic pain, pain management     Disposition:  Home    Exam:     /83   Pulse 84   Temp 98.5 °F (36.9 °C) (Oral)   Resp 18   Ht 5' (1.524 m)   Wt 171 lb 4.8 oz (77.7 kg)   SpO2 94%   BMI 33.45 kg/m²     Consults:     IP CONSULT TO PULMONOLOGY  IP CONSULT TO PULMONOLOGY    Labs:  For convenience and continuity at follow-up the following most recent labs are provided:    Lab Results   Component Value Date    WBC 5.3 2019    HGB 12.3 2019    HCT 37.6 2019    MCV 81.6 2019     06/10/2019     2019    K 4.0 2019 supply**      zolpidem (AMBIEN) 5 MG tablet Take 5 mg by mouth nightly as needed for Sleep. Yvette Mendoza ibuprofen (ADVIL;MOTRIN) 600 MG tablet Take 600 mg by mouth every 8 hours as needed       budesonide-formoterol (SYMBICORT) 160-4.5 MCG/ACT AERO INHALE TWO PUFFS BY MOUTH TWICE A DAY -- MUST BE SEEN for future refills  Qty: 1 Inhaler, Refills: 1    Associated Diagnoses: Acute exacerbation of COPD with asthma ([facility])      oxymetazoline (AFRIN) 0.05 % nasal spray 2 sprays by Nasal route 2 times daily as needed for Congestion      ALPRAZolam (XANAX) 1 MG tablet Take 1 mg by mouth 4 times daily. 0400, 0800, 1400, 2000. oxyCODONE (OXY-IR) 15 MG immediate release tablet Take 15 mg by mouth every 4 hours as needed for Pain      methadone (DOLOPHINE) 10 MG tablet Take 20-30 mg by mouth 2 times daily. 30mg at 0400, 20mg at 1600      fluticasone (FLONASE) 50 MCG/ACT nasal spray 1 spray by Nasal route nightly     Associated Diagnoses: Chronic pain syndrome; Fibromyalgia; Failed back surgical syndrome; Failed neck syndrome; Complication of joint prosthesis ([facility])      theophylline (DARCY-24) 200 MG extended release capsule Take 1 capsule by mouth daily  Qty: 30 capsule, Refills: 3           Current Discharge Medication List      STOP taking these medications       furosemide (LASIX) 20 MG tablet Comments:   Reason for Stopping:         pseudoephedrine-guaiFENesin (MUCINEX D)  MG per extended release tablet Comments:   Reason for Stopping:         theophylline (UNIPHYL) 400 MG extended release tablet Comments:   Reason for Stopping:         GARCINIA CAMBOGIA-CHROMIUM PO Comments:   Reason for Stopping: Follow-up appointments:  one week    Provider Follow-up:    pcp    Condition at Discharge:  Stable    The patient was seen and examined on day of discharge and this discharge summary is in conjunction with any daily progress note from day of discharge. Time Spent on discharge is 45 minutes  in the examination, evaluation, counseling and review of medications and discharge plan. Signed:    Mert Rushing DO   6/10/2019      Thank you Anila Rodney for the opportunity to be involved in this patient's care. If you have any questions or concerns please feel free to contact me at [phone].

## 2019-06-12 LAB
BLOOD CULTURE, ROUTINE: NORMAL
CULTURE, BLOOD 2: NORMAL

## 2019-06-24 ENCOUNTER — APPOINTMENT (OUTPATIENT)
Dept: GENERAL RADIOLOGY | Age: 49
End: 2019-06-24
Payer: COMMERCIAL

## 2019-06-24 ENCOUNTER — TELEPHONE (OUTPATIENT)
Dept: PULMONOLOGY | Age: 49
End: 2019-06-24

## 2019-06-24 ENCOUNTER — APPOINTMENT (OUTPATIENT)
Dept: CT IMAGING | Age: 49
End: 2019-06-24
Payer: COMMERCIAL

## 2019-06-24 ENCOUNTER — HOSPITAL ENCOUNTER (EMERGENCY)
Age: 49
Discharge: HOME | End: 2019-06-24
Attending: EMERGENCY MEDICINE
Payer: COMMERCIAL

## 2019-06-24 VITALS
SYSTOLIC BLOOD PRESSURE: 132 MMHG | DIASTOLIC BLOOD PRESSURE: 82 MMHG | RESPIRATION RATE: 18 BRPM | OXYGEN SATURATION: 95 % | BODY MASS INDEX: 33.59 KG/M2 | WEIGHT: 171.08 LBS | HEIGHT: 60 IN | HEART RATE: 79 BPM | TEMPERATURE: 97.5 F

## 2019-06-24 DIAGNOSIS — S01.81XA FACIAL LACERATION, INITIAL ENCOUNTER: ICD-10-CM

## 2019-06-24 DIAGNOSIS — S09.90XA CLOSED HEAD INJURY, INITIAL ENCOUNTER: ICD-10-CM

## 2019-06-24 DIAGNOSIS — T40.1X1A ACCIDENTAL OVERDOSE OF HEROIN, INITIAL ENCOUNTER (HCC): Primary | ICD-10-CM

## 2019-06-24 DIAGNOSIS — J45.51 SEVERE PERSISTENT ASTHMA WITH EXACERBATION: ICD-10-CM

## 2019-06-24 LAB
AMPHETAMINE SCREEN, URINE: ABNORMAL
ANION GAP SERPL CALCULATED.3IONS-SCNC: 15 MMOL/L (ref 3–16)
BARBITURATE SCREEN URINE: ABNORMAL
BASE EXCESS ARTERIAL: -0.2 MMOL/L (ref -3–3)
BASOPHILS ABSOLUTE: 0.1 K/UL (ref 0–0.2)
BASOPHILS RELATIVE PERCENT: 0.9 %
BENZODIAZEPINE SCREEN, URINE: POSITIVE
BUN BLDV-MCNC: 38 MG/DL (ref 7–20)
CALCIUM SERPL-MCNC: 8.8 MG/DL (ref 8.3–10.6)
CANNABINOID SCREEN URINE: ABNORMAL
CARBOXYHEMOGLOBIN ARTERIAL: 0.9 % (ref 0–1.5)
CHLORIDE BLD-SCNC: 104 MMOL/L (ref 99–110)
CO2: 21 MMOL/L (ref 21–32)
COCAINE METABOLITE SCREEN URINE: ABNORMAL
CREAT SERPL-MCNC: 1 MG/DL (ref 0.6–1.1)
EOSINOPHILS ABSOLUTE: 1.3 K/UL (ref 0–0.6)
EOSINOPHILS RELATIVE PERCENT: 10.5 %
GFR AFRICAN AMERICAN: >60
GFR NON-AFRICAN AMERICAN: 58
GLUCOSE BLD-MCNC: 63 MG/DL (ref 70–99)
HCO3 ARTERIAL: 24.2 MMOL/L (ref 21–29)
HCT VFR BLD CALC: 36.5 % (ref 36–48)
HEMOGLOBIN, ART, EXTENDED: 12 G/DL (ref 12–16)
HEMOGLOBIN: 11.8 G/DL (ref 12–16)
LYMPHOCYTES ABSOLUTE: 2.3 K/UL (ref 1–5.1)
LYMPHOCYTES RELATIVE PERCENT: 17.9 %
Lab: ABNORMAL
MCH RBC QN AUTO: 26.5 PG (ref 26–34)
MCHC RBC AUTO-ENTMCNC: 32.5 G/DL (ref 31–36)
MCV RBC AUTO: 81.7 FL (ref 80–100)
METHADONE SCREEN, URINE: POSITIVE
METHEMOGLOBIN ARTERIAL: 0.7 %
MONOCYTES ABSOLUTE: 0.9 K/UL (ref 0–1.3)
MONOCYTES RELATIVE PERCENT: 7.2 %
NEUTROPHILS ABSOLUTE: 8.1 K/UL (ref 1.7–7.7)
NEUTROPHILS RELATIVE PERCENT: 63.5 %
O2 CONTENT ARTERIAL: 16 ML/DL
O2 SAT, ARTERIAL: 96.1 %
O2 THERAPY: ABNORMAL
OPIATE SCREEN URINE: ABNORMAL
OXYCODONE URINE: POSITIVE
PCO2 ARTERIAL: 40.7 MMHG (ref 35–45)
PDW BLD-RTO: 16.5 % (ref 12.4–15.4)
PH ARTERIAL: 7.39 (ref 7.35–7.45)
PH UA: 5
PHENCYCLIDINE SCREEN URINE: ABNORMAL
PLATELET # BLD: 349 K/UL (ref 135–450)
PMV BLD AUTO: 7.2 FL (ref 5–10.5)
PO2 ARTERIAL: 74.7 MMHG (ref 75–108)
POTASSIUM SERPL-SCNC: 3.9 MMOL/L (ref 3.5–5.1)
PROPOXYPHENE SCREEN: ABNORMAL
RBC # BLD: 4.46 M/UL (ref 4–5.2)
SODIUM BLD-SCNC: 140 MMOL/L (ref 136–145)
TCO2 ARTERIAL: 25.4 MMOL/L
WBC # BLD: 12.7 K/UL (ref 4–11)

## 2019-06-24 PROCEDURE — 85025 COMPLETE CBC W/AUTO DIFF WBC: CPT

## 2019-06-24 PROCEDURE — 6370000000 HC RX 637 (ALT 250 FOR IP): Performed by: EMERGENCY MEDICINE

## 2019-06-24 PROCEDURE — 94761 N-INVAS EAR/PLS OXIMETRY MLT: CPT

## 2019-06-24 PROCEDURE — 99285 EMERGENCY DEPT VISIT HI MDM: CPT

## 2019-06-24 PROCEDURE — 36415 COLL VENOUS BLD VENIPUNCTURE: CPT

## 2019-06-24 PROCEDURE — 96375 TX/PRO/DX INJ NEW DRUG ADDON: CPT

## 2019-06-24 PROCEDURE — 6360000002 HC RX W HCPCS: Performed by: EMERGENCY MEDICINE

## 2019-06-24 PROCEDURE — 80307 DRUG TEST PRSMV CHEM ANLYZR: CPT

## 2019-06-24 PROCEDURE — 80048 BASIC METABOLIC PNL TOTAL CA: CPT

## 2019-06-24 PROCEDURE — 71045 X-RAY EXAM CHEST 1 VIEW: CPT

## 2019-06-24 PROCEDURE — 94640 AIRWAY INHALATION TREATMENT: CPT

## 2019-06-24 PROCEDURE — 70450 CT HEAD/BRAIN W/O DYE: CPT

## 2019-06-24 PROCEDURE — 2700000000 HC OXYGEN THERAPY PER DAY

## 2019-06-24 PROCEDURE — 72125 CT NECK SPINE W/O DYE: CPT

## 2019-06-24 PROCEDURE — 96374 THER/PROPH/DIAG INJ IV PUSH: CPT

## 2019-06-24 PROCEDURE — 4500000025 HC ED LEVEL 5 PROCEDURE

## 2019-06-24 PROCEDURE — 82803 BLOOD GASES ANY COMBINATION: CPT

## 2019-06-24 PROCEDURE — 36600 WITHDRAWAL OF ARTERIAL BLOOD: CPT

## 2019-06-24 RX ORDER — PREDNISONE 20 MG/1
TABLET ORAL
Qty: 18 TABLET | Refills: 0 | Status: SHIPPED | OUTPATIENT
Start: 2019-06-24 | End: 2019-07-04

## 2019-06-24 RX ORDER — METHYLPREDNISOLONE SODIUM SUCCINATE 125 MG/2ML
125 INJECTION, POWDER, LYOPHILIZED, FOR SOLUTION INTRAMUSCULAR; INTRAVENOUS ONCE
Status: COMPLETED | OUTPATIENT
Start: 2019-06-24 | End: 2019-06-24

## 2019-06-24 RX ORDER — IPRATROPIUM BROMIDE AND ALBUTEROL SULFATE 2.5; .5 MG/3ML; MG/3ML
1 SOLUTION RESPIRATORY (INHALATION) ONCE
Status: COMPLETED | OUTPATIENT
Start: 2019-06-24 | End: 2019-06-24

## 2019-06-24 RX ORDER — ALBUTEROL SULFATE 2.5 MG/3ML
5 SOLUTION RESPIRATORY (INHALATION) ONCE
Status: COMPLETED | OUTPATIENT
Start: 2019-06-24 | End: 2019-06-24

## 2019-06-24 RX ORDER — NALOXONE HYDROCHLORIDE 1 MG/ML
2 INJECTION INTRAMUSCULAR; INTRAVENOUS; SUBCUTANEOUS ONCE
Status: COMPLETED | OUTPATIENT
Start: 2019-06-24 | End: 2019-06-24

## 2019-06-24 RX ADMIN — NALOXONE HYDROCHLORIDE 2 MG: 1 INJECTION PARENTERAL at 19:13

## 2019-06-24 RX ADMIN — ALBUTEROL SULFATE 5 MG: 2.5 SOLUTION RESPIRATORY (INHALATION) at 19:28

## 2019-06-24 RX ADMIN — METHYLPREDNISOLONE SODIUM SUCCINATE 125 MG: 125 INJECTION, POWDER, FOR SOLUTION INTRAMUSCULAR; INTRAVENOUS at 19:13

## 2019-06-24 RX ADMIN — IPRATROPIUM BROMIDE AND ALBUTEROL SULFATE 1 AMPULE: .5; 3 SOLUTION RESPIRATORY (INHALATION) at 20:13

## 2019-06-24 RX ADMIN — IPRATROPIUM BROMIDE AND ALBUTEROL SULFATE 1 AMPULE: .5; 3 SOLUTION RESPIRATORY (INHALATION) at 19:28

## 2019-06-24 RX ADMIN — ALBUTEROL SULFATE 5 MG: 2.5 SOLUTION RESPIRATORY (INHALATION) at 20:13

## 2019-06-24 ASSESSMENT — PAIN SCALES - GENERAL
PAINLEVEL_OUTOF10: 10
PAINLEVEL_OUTOF10: 6
PAINLEVEL_OUTOF10: 6

## 2019-06-24 ASSESSMENT — PAIN DESCRIPTION - PAIN TYPE
TYPE: CHRONIC PAIN
TYPE: CHRONIC PAIN

## 2019-06-24 ASSESSMENT — PAIN DESCRIPTION - ORIENTATION: ORIENTATION: LEFT;RIGHT

## 2019-06-24 ASSESSMENT — PAIN DESCRIPTION - LOCATION: LOCATION: LEG

## 2019-06-24 ASSESSMENT — PAIN DESCRIPTION - DESCRIPTORS: DESCRIPTORS: ACHING

## 2019-06-24 NOTE — ED PROVIDER NOTES
above.    Except as noted above the remainder of the review of systems was reviewed and negative. PAST MEDICAL HISTORY     Past Medical History:   Diagnosis Date    Anxiety and depression     Arthritis     Asthma     Chronic pain syndrome     Complication of joint prosthesis ([address]     COPD (chronic obstructive pulmonary disease) (HCC)     Failed back surgical syndrome     Failed neck syndrome     Fibromyalgia     GERD (gastroesophageal reflux disease)     Influenza A 2019    MDRO (multiple drug resistant organisms) resistance 2018    sputum    Obesity (BMI 30-39. 9)     Pneumonia          SURGICAL HISTORY       Past Surgical History:   Procedure Laterality Date    ABDOMEN SURGERY      BACK SURGERY       SECTION      x2    ENDOSCOPY, COLON, DIAGNOSTIC      HERNIA REPAIR      JOINT REPLACEMENT      right knee    KNEE ARTHROCENTESIS      removal of bilateral knees    SPINE SURGERY      5  back surgeries     UPPER GASTROINTESTINAL ENDOSCOPY  2015         CURRENT MEDICATIONS       Previous Medications    ALBUTEROL SULFATE  (90 BASE) MCG/ACT INHALER    INHALE 2 PUFFS INTO THE LUNGS EVERY 4 HOURS AS NEEDED FOR WHEEZING OR SHORTNESS OF BREATH    ALPRAZOLAM (XANAX) 1 MG TABLET    Take 1 mg by mouth 4 times daily. 0400, 0800, 1400, 2000. BUDESONIDE-FORMOTEROL (SYMBICORT) 160-4.5 MCG/ACT AERO    INHALE TWO PUFFS BY MOUTH TWICE A DAY -- MUST BE SEEN for future refills    FLUTICASONE (FLONASE) 50 MCG/ACT NASAL SPRAY    1 spray by Nasal route nightly     IBUPROFEN (ADVIL;MOTRIN) 600 MG TABLET    Take 600 mg by mouth every 8 hours as needed     IPRATROPIUM-ALBUTEROL (DUONEB) 0.5-2.5 (3) MG/3ML SOLN NEBULIZER SOLUTION    Take 3 mLs by nebulization every 4 hours as needed for Shortness of Breath    METHADONE (DOLOPHINE) 10 MG TABLET    Take 20-30 mg by mouth 2 times daily.  30mg at 0400, 20mg at 1600    OXYCODONE (OXY-IR) 15 MG IMMEDIATE RELEASE TABLET    Take 15 mg by mouth every 4 hours as needed for Pain    OXYMETAZOLINE (AFRIN) 0.05 % NASAL SPRAY    2 sprays by Nasal route 2 times daily as needed for Congestion    THEOPHYLLINE (DARCY-24) 200 MG EXTENDED RELEASE CAPSULE    Take 1 capsule by mouth daily    UMECLIDINIUM BROMIDE (INCRUSE ELLIPTA) 62.5 MCG/INH AEPB    Inhale 1 puff into the lungs daily    ZOLPIDEM (AMBIEN) 5 MG TABLET    Take 5 mg by mouth nightly as needed for Sleep. .       ALLERGIES     Gabapentin; Penicillins; Morphine; and Morphine and related    FAMILY HISTORY       Family History   Problem Relation Age of Onset    COPD Mother     Asthma Father           SOCIAL HISTORY       Social History     Socioeconomic History    Marital status: Single     Spouse name: None    Number of children: 2    Years of education: None    Highest education level: None   Occupational History    None   Social Needs    Financial resource strain: None    Food insecurity:     Worry: None     Inability: None    Transportation needs:     Medical: None     Non-medical: None   Tobacco Use    Smoking status: Never Smoker    Smokeless tobacco: Never Used    Tobacco comment: encouraged to never smoke    Substance and Sexual Activity    Alcohol use: No     Alcohol/week: 0.0 oz    Drug use: Yes     Types: Opiates      Comment: snort heroine    Sexual activity: Not Currently   Lifestyle    Physical activity:     Days per week: None     Minutes per session: None    Stress: None   Relationships    Social connections:     Talks on phone: None     Gets together: None     Attends Temple service: None     Active member of club or organization: None     Attends meetings of clubs or organizations: None     Relationship status: None    Intimate partner violence:     Fear of current or ex partner: None     Emotionally abused: None     Physically abused: None     Forced sexual activity: None   Other Topics Concern    None   Social History Narrative    None         PHYSICAL EXAM (up to 7 for level 4, 8 or more for level 5)     ED Triage Vitals [06/24/19 1832]   BP Temp Temp Source Pulse Resp SpO2 Height Weight   (!) 129/58 97.5 °F (36.4 °C) Oral 81 26 -- 5' (1.524 m) 171 lb 1.2 oz (77.6 kg)       General: An alert moderately obese white female no acute distress. Head: 2 cm laceration in the right supraorbital ridge in the eyebrow. No hematoma. Eyes: Pupils equal round reactive. Pupils 4 mm. Extraocular movements are intact. ENT: TMs are normal.  Nose clear. Oropharynx negative. Edentulous. Neck: Supple, nontender. Heart: Regular rate and rhythm. No murmurs or gallops noted. Lungs: Breath sounds equal bilaterally, moderate expiratory wheezing. No rales or rhonchi. Chest wall: Nontender. Abdomen: Obese, soft, nontender. Musculoskeletal: No external signs of trauma to the upper lower extremities. She has some pain with range of motion of her hips and knees. Neuro: Awake, alert, oriented. Symmetrical reactive pupils. Intact doctor movements. No facial asymmetry. Midline tongue. Symmetrical motor function. DIFFERENTIAL DIAGNOSIS   Differential includes but is not limited to drug overdose, closed head injury, traumatic intracerebral hemorrhage, traumatic subarachnoid/subdural, facial contusion, facial laceration, asthma exacerbation. DIAGNOSTIC RESULTS     EKG: All EKG's are interpreted by Jv Phillips MD in the absence of a cardiologist.      RADIOLOGY:   Non-plain film images such as CT, Ultrasound and MRI are read by the radiologist. Plain radiographic images are visualized and preliminarily interpreted Jv Phillips MD with the below findings:      Interpretation per the Radiologist below, if available at the time of this note:    CT Head WO Contrast   Final Result   No acute abnormality of the cervical spine. CT CERVICAL SPINE WO CONTRAST   Final Result   No acute abnormality of the cervical spine.          XR CHEST PORTABLE Final Result   No acute abnormality detected. ED BEDSIDE ULTRASOUND:   Performed by ED Physician - none    LABS:  Labs Reviewed   CBC WITH AUTO DIFFERENTIAL - Abnormal; Notable for the following components:       Result Value    WBC 12.7 (*)     Hemoglobin 11.8 (*)     RDW 16.5 (*)     Neutrophils # 8.1 (*)     Eosinophils # 1.3 (*)     All other components within normal limits    Narrative:     Performed at:  [address] [place]   Phone [phone]   BASIC METABOLIC PANEL - Abnormal; Notable for the following components:    Glucose 63 (*)     BUN 38 (*)     GFR Non- 58 (*)     All other components within normal limits    Narrative:     Performed at:  [address] [place]   Phone [phone]   URINE DRUG SCREEN - Abnormal; Notable for the following components:    Benzodiazepine Screen, Urine POSITIVE (*)     Methadone Screen, Urine POSITIVE (*)     Oxycodone Urine POSITIVE (*)     All other components within normal limits    Narrative:     Performed at:  [address] [facility] [place]   Phone [phone]   BLOOD GAS, ARTERIAL - Abnormal; Notable for the following components:    pO2, Arterial 74.7 (*)     All other components within normal limits    Narrative:     Performed at:  [address] [place]   Phone [phone]       All other labs were within normal range or not returned as of this dictation. EMERGENCY DEPARTMENT COURSE and DIFFERENTIAL DIAGNOSIS/MDM:   Vitals:    Vitals:    06/24/19 2013 06/24/19 2017 06/24/19 2018 06/24/19 2035   BP:   (!) 154/101 133/72   Pulse:   87 85   Resp:   (!) 32 18   Temp:       TempSrc:       SpO2: 97% 94% 100% 97%   Weight:       Height:           1930:  The patient has gradually become more obtunded again. She is become more tachypneic. We gave her an additional 2 mg of Narcan with no significant improvement. Arterial blood gas has been obtained. I am very familiar with this patient. She has severe asthma. She goes from being mildly dyspneic to being in respiratory failure within a matter of 45 to 60 minutes. We will continue to watch her closely, she is getting hand-held nebulizers right now, she is gotten IV Solu-Medrol. She may require intubation if she does not improve significantly over the next 30 to 60 minutes. 2005: The patient is lethargic but will arouse and respond to her name. She still tachypneic. She still wheezing, she is moving air somewhat better. Her arterial blood gas shows a normal pH with no CO2 retention. We will continue to monitor to see if airway management is indicated. 2030:  More alert. Asking for water to drink. Still tachypneic. HR 80. BP stable. 2105:  More alert. Continues to improve. 2205: Patient sleeping. She wakes up easily. She is moving air well, no significant wheezing. We will observe her for another hour or 2, I anticipate with the way she looks at this point in time that she should be able to be discharged home. Her presentation is consistent with the reported heroin overdose as well as exacerbation of her asthma. She did hit her head. She sustained a laceration. The laceration was repaired. She denied neck pain. CT of her cervical spine shows no acute abnormalities. CT of her head showed no acute intracranial injury. This patient is well-known to me. She has persistent severe asthma with frequent exacerbations and is required intubation multiple times. She has improved significantly since she is been here. If she continues to do well she will be discharged on a prednisone taper along with her current medications. Care was assumed by Dr. Zoe Kent.     CONSULTS:  None    PROCEDURES:  Lac Repair  Date/Time: Physician        Ricardo Smith MD  06/24/19 8686

## 2019-06-24 NOTE — TELEPHONE ENCOUNTER
Pt stated that she is due for O2 recert, but that she is only using O2 PRN and she is concerned that it will show she does not need it when she is not sick, please advise

## 2019-06-24 NOTE — ED NOTES
Pt is difficult to arouse again. Not following commands. Saliva collecting pt spits out of mouth with each breath. Dr Tess Song made aware.       Alice Payne RN  06/24/19 1934

## 2019-06-24 NOTE — ED NOTES
Pt is now more awake. Set up on own and started grabbing objects within reach. Pt reoriented. Will monitor closely.       Laine Alcazar RN  06/24/19 1920

## 2019-06-25 ENCOUNTER — TELEPHONE (OUTPATIENT)
Dept: OTHER | Facility: CLINIC | Age: 49
End: 2019-06-25

## 2019-06-25 NOTE — ED NOTES
Fall risk screening completed. Fall risk bracelet applied to patient. Non-skid socks provided and placed on patient. The fall risk is indicated using dome light . Bed alarm on and activated. The call light is within the patient's reach and instructions for use were provided. The bed is in the lowest position with wheels locked. The patient has been advised to notify staff using the call light if there is a need to get up or use restroom. The patient verbalized understanding of safety precautions and how to contact staff for assistance.         Mirtha Ford, RN  06/24/19 7961

## 2019-06-25 NOTE — ED NOTES
Pt is awake, alert and orient. Pt ambulated to restroom without difficulty. Steady gait. Pt back to room. Pt called her mom.      Prasanna Mosquera RN  06/24/19 5596

## 2019-06-25 NOTE — TELEPHONE ENCOUNTER
Please order an overnight oximetry as well.   We can see if she qualifies for oxygen with that as well as with the 6-minute walk test.

## 2019-06-25 NOTE — ED NOTES
RN to room to check on pt. Pt sleeping, awakes to touch but quickly falls back to sleep. 2L oxygen nc. Family at bedside, informed RN they are leaving. Malcolm Kerline (sister) [phone]. Please call if need anything.      Ofelia Sears RN  06/24/19 2039       Ofelia Sears RN  06/24/19 7599

## 2019-06-25 NOTE — TELEPHONE ENCOUNTER
Writer attempted to contact pt to inform her of ED f/u appt. Attempt unsuccessful. Left message with pt's mother to inform her that appt is scheduled for Wednesday 6-26 @3:45pm with Dr. Dominik Stearns.

## 2019-06-25 NOTE — TELEPHONE ENCOUNTER
Phone call to patient regarding need for 6 min walk and over night Ox. She was transferred to pulmonary rehab to schedule and order will be sent to [facility] for over night Ox testing. She seemed to expressed understanding.

## 2019-07-15 ENCOUNTER — TELEPHONE (OUTPATIENT)
Dept: PULMONOLOGY | Age: 49
End: 2019-07-15

## 2019-07-15 NOTE — TELEPHONE ENCOUNTER
Left message for patient, regarding no showing for the 6 minute walk on 7/11/19. Advised her to reschedule it before she is seen by Dr. Michaela LU [facility] on 7/31/19.

## 2019-12-02 ENCOUNTER — TELEPHONE (OUTPATIENT)
Dept: PULMONOLOGY | Age: 49
End: 2019-12-02

## 2019-12-02 RX ORDER — PREDNISONE 20 MG/1
TABLET ORAL
Qty: 17 TABLET | Refills: 0 | Status: ON HOLD | OUTPATIENT
Start: 2019-12-02 | End: 2022-01-20

## 2019-12-02 RX ORDER — DOXYCYCLINE HYCLATE 100 MG
100 TABLET ORAL 2 TIMES DAILY
Qty: 14 TABLET | Refills: 0 | Status: SHIPPED | OUTPATIENT
Start: 2019-12-02 | End: 2019-12-09

## 2019-12-02 RX ORDER — PREDNISONE 20 MG/1
TABLET ORAL
Qty: 13 TABLET | Refills: 0 | Status: SHIPPED | OUTPATIENT
Start: 2019-12-02 | End: 2019-12-02

## 2021-07-07 NOTE — PROGRESS NOTES
Labs     06/06/19 1919   AST 12*   ALT 9*   BILITOT <0.2   ALKPHOS 93     No results for input(s): INR in the last 72 hours. Recent Labs     06/06/19 1919   TROPONINI <0.01       Urinalysis:      Lab Results   Component Value Date    NITRU Negative 04/27/2018    WBCUA 105 04/27/2018    BACTERIA 2+ 04/27/2018    RBCUA 5 04/27/2018    BLOODU SMALL 04/27/2018    SPECGRAV 1.019 04/27/2018    GLUCOSEU Negative 04/27/2018    GLUCOSEU NEGATIVE 06/02/2010       Radiology:  XR CHEST PORTABLE   Final Result   Patchy right basilar opacities which may represent an infectious/inflammatory   process. This could be further evaluated via dedicated PA and lateral views   of the chest when the patient is better able to tolerate imaging. Assessment/Plan:    Active Hospital Problems    Diagnosis    Acute respiratory failure with hypoxia ([facility] [address] [J96.01]     1. Acute on chronic respiratory failure with hypoxia due to pneumonia and asthma exacerbation, oxygen, duo nebs, pulmonary consulted, hopefully home soon. 2. Pneumonia, iv antibiotics, afebrile. Still coughing. 3. Asthma exacerbation, steroids as ordered.   4. Chronic pain, pain management           Diet: DIET GENERAL;  Code Status: Full Code      Dispo - hopefully home in luci Cota MD Initial Size Of Lesion: 0.5

## 2022-01-18 ENCOUNTER — HOSPITAL ENCOUNTER (OUTPATIENT)
Age: 52
Setting detail: OBSERVATION
Discharge: HOME | End: 2022-01-20
Attending: INTERNAL MEDICINE | Admitting: INTERNAL MEDICINE
Payer: MEDICARE

## 2022-01-18 ENCOUNTER — APPOINTMENT (OUTPATIENT)
Dept: CT IMAGING | Age: 52
End: 2022-01-18
Payer: MEDICARE

## 2022-01-18 ENCOUNTER — APPOINTMENT (OUTPATIENT)
Dept: GENERAL RADIOLOGY | Age: 52
End: 2022-01-18
Payer: MEDICARE

## 2022-01-18 DIAGNOSIS — R41.82 ALTERED MENTAL STATUS, UNSPECIFIED ALTERED MENTAL STATUS TYPE: Primary | ICD-10-CM

## 2022-01-18 DIAGNOSIS — N39.0 URINARY TRACT INFECTION WITHOUT HEMATURIA, SITE UNSPECIFIED: ICD-10-CM

## 2022-01-18 PROBLEM — R56.9 SEIZURE-LIKE ACTIVITY (HCC): Status: ACTIVE | Noted: 2022-01-18

## 2022-01-18 LAB
A/G RATIO: 1.7 (ref 1.1–2.2)
ACETAMINOPHEN LEVEL: <5 UG/ML (ref 10–30)
ALBUMIN SERPL-MCNC: 4.3 G/DL (ref 3.4–5)
ALP BLD-CCNC: 95 U/L (ref 40–129)
ALT SERPL-CCNC: 16 U/L (ref 10–40)
AMMONIA: 26 UMOL/L (ref 11–51)
AMPHETAMINE SCREEN, URINE: ABNORMAL
ANION GAP SERPL CALCULATED.3IONS-SCNC: 13 MMOL/L (ref 3–16)
AST SERPL-CCNC: 18 U/L (ref 15–37)
BACTERIA: ABNORMAL /HPF
BARBITURATE SCREEN URINE: ABNORMAL
BASE EXCESS VENOUS: 0 MMOL/L
BASOPHILS ABSOLUTE: 0.1 K/UL (ref 0–0.2)
BASOPHILS RELATIVE PERCENT: 1 %
BENZODIAZEPINE SCREEN, URINE: ABNORMAL
BILIRUB SERPL-MCNC: <0.2 MG/DL (ref 0–1)
BILIRUBIN URINE: NEGATIVE
BLOOD, URINE: NEGATIVE
BUN BLDV-MCNC: 27 MG/DL (ref 7–20)
CALCIUM SERPL-MCNC: 8.7 MG/DL (ref 8.3–10.6)
CANNABINOID SCREEN URINE: ABNORMAL
CARBOXYHEMOGLOBIN: 0.9 %
CHLORIDE BLD-SCNC: 100 MMOL/L (ref 99–110)
CLARITY: ABNORMAL
CO2: 24 MMOL/L (ref 21–32)
COCAINE METABOLITE SCREEN URINE: ABNORMAL
COLOR: YELLOW
CREAT SERPL-MCNC: 1.2 MG/DL (ref 0.6–1.1)
EOSINOPHILS ABSOLUTE: 1 K/UL (ref 0–0.6)
EOSINOPHILS RELATIVE PERCENT: 11.4 %
EPITHELIAL CELLS, UA: 12 /HPF (ref 0–5)
ETHANOL: 11 MG/DL (ref 0–0.08)
GFR AFRICAN AMERICAN: 57
GFR NON-AFRICAN AMERICAN: 47
GLUCOSE BLD-MCNC: 93 MG/DL (ref 70–99)
GLUCOSE URINE: NEGATIVE MG/DL
HCO3 VENOUS: 28 MMOL/L (ref 23–29)
HCT VFR BLD CALC: 39.1 % (ref 36–48)
HEMOGLOBIN: 12.9 G/DL (ref 12–16)
HYALINE CASTS: 17 /LPF (ref 0–8)
KETONES, URINE: NEGATIVE MG/DL
LACTIC ACID, SEPSIS: 1 MMOL/L (ref 0.4–1.9)
LEUKOCYTE ESTERASE, URINE: ABNORMAL
LYMPHOCYTES ABSOLUTE: 2.1 K/UL (ref 1–5.1)
LYMPHOCYTES RELATIVE PERCENT: 24.7 %
Lab: ABNORMAL
MCH RBC QN AUTO: 28.3 PG (ref 26–34)
MCHC RBC AUTO-ENTMCNC: 32.9 G/DL (ref 31–36)
MCV RBC AUTO: 86 FL (ref 80–100)
METHADONE SCREEN, URINE: POSITIVE
METHEMOGLOBIN VENOUS: 0.7 %
MICROSCOPIC EXAMINATION: YES
MONOCYTES ABSOLUTE: 0.7 K/UL (ref 0–1.3)
MONOCYTES RELATIVE PERCENT: 7.8 %
NEUTROPHILS ABSOLUTE: 4.8 K/UL (ref 1.7–7.7)
NEUTROPHILS RELATIVE PERCENT: 55.1 %
NITRITE, URINE: NEGATIVE
O2 SAT, VEN: 50 %
O2 THERAPY: ABNORMAL
OPIATE SCREEN URINE: ABNORMAL
OXYCODONE URINE: ABNORMAL
PCO2, VEN: 62 MMHG (ref 40–50)
PDW BLD-RTO: 13.8 % (ref 12.4–15.4)
PH UA: 5
PH UA: 6 (ref 5–8)
PH VENOUS: 7.27 (ref 7.35–7.45)
PHENCYCLIDINE SCREEN URINE: ABNORMAL
PLATELET # BLD: 291 K/UL (ref 135–450)
PMV BLD AUTO: 6.8 FL (ref 5–10.5)
PO2, VEN: <30 MMHG
POTASSIUM REFLEX MAGNESIUM: 5.2 MMOL/L (ref 3.5–5.1)
PROPOXYPHENE SCREEN: ABNORMAL
PROTEIN UA: NEGATIVE MG/DL
RBC # BLD: 4.54 M/UL (ref 4–5.2)
RBC UA: 4 /HPF (ref 0–4)
SALICYLATE, SERUM: <0.3 MG/DL (ref 15–30)
SODIUM BLD-SCNC: 137 MMOL/L (ref 136–145)
SPECIFIC GRAVITY UA: 1.02 (ref 1–1.03)
TCO2 CALC VENOUS: 30 MMOL/L
TOTAL PROTEIN: 6.9 G/DL (ref 6.4–8.2)
TROPONIN: <0.01 NG/ML
URINE REFLEX TO CULTURE: YES
URINE TYPE: ABNORMAL
UROBILINOGEN, URINE: 0.2 E.U./DL
WBC # BLD: 8.6 K/UL (ref 4–11)
WBC UA: 61 /HPF (ref 0–5)

## 2022-01-18 PROCEDURE — 82077 ASSAY SPEC XCP UR&BREATH IA: CPT

## 2022-01-18 PROCEDURE — 99283 EMERGENCY DEPT VISIT LOW MDM: CPT

## 2022-01-18 PROCEDURE — 80179 DRUG ASSAY SALICYLATE: CPT

## 2022-01-18 PROCEDURE — 87086 URINE CULTURE/COLONY COUNT: CPT

## 2022-01-18 PROCEDURE — 82140 ASSAY OF AMMONIA: CPT

## 2022-01-18 PROCEDURE — 70450 CT HEAD/BRAIN W/O DYE: CPT

## 2022-01-18 PROCEDURE — 81001 URINALYSIS AUTO W/SCOPE: CPT

## 2022-01-18 PROCEDURE — 36415 COLL VENOUS BLD VENIPUNCTURE: CPT

## 2022-01-18 PROCEDURE — 83605 ASSAY OF LACTIC ACID: CPT

## 2022-01-18 PROCEDURE — 82803 BLOOD GASES ANY COMBINATION: CPT

## 2022-01-18 PROCEDURE — 80143 DRUG ASSAY ACETAMINOPHEN: CPT

## 2022-01-18 PROCEDURE — 93005 ELECTROCARDIOGRAM TRACING: CPT

## 2022-01-18 PROCEDURE — 84484 ASSAY OF TROPONIN QUANT: CPT

## 2022-01-18 PROCEDURE — 85025 COMPLETE CBC W/AUTO DIFF WBC: CPT

## 2022-01-18 PROCEDURE — 71045 X-RAY EXAM CHEST 1 VIEW: CPT

## 2022-01-18 PROCEDURE — 80053 COMPREHEN METABOLIC PANEL: CPT

## 2022-01-18 PROCEDURE — 80307 DRUG TEST PRSMV CHEM ANLYZR: CPT

## 2022-01-18 ASSESSMENT — ENCOUNTER SYMPTOMS
NAUSEA: 0
SHORTNESS OF BREATH: 0
VOMITING: 0
ABDOMINAL PAIN: 0
COUGH: 0

## 2022-01-19 ENCOUNTER — APPOINTMENT (OUTPATIENT)
Dept: MRI IMAGING | Age: 52
End: 2022-01-19
Payer: MEDICARE

## 2022-01-19 LAB
ANION GAP SERPL CALCULATED.3IONS-SCNC: 9 MMOL/L (ref 3–16)
BILIRUBIN URINE: NEGATIVE
BLOOD, URINE: NEGATIVE
BUN BLDV-MCNC: 22 MG/DL (ref 7–20)
CALCIUM SERPL-MCNC: 8.2 MG/DL (ref 8.3–10.6)
CHLORIDE BLD-SCNC: 101 MMOL/L (ref 99–110)
CLARITY: ABNORMAL
CO2: 27 MMOL/L (ref 21–32)
COLOR: YELLOW
CREAT SERPL-MCNC: 0.9 MG/DL (ref 0.6–1.1)
EKG ATRIAL RATE: 90 BPM
EKG DIAGNOSIS: NORMAL
EKG P AXIS: 69 DEGREES
EKG P-R INTERVAL: 154 MS
EKG Q-T INTERVAL: 356 MS
EKG QRS DURATION: 82 MS
EKG QTC CALCULATION (BAZETT): 435 MS
EKG R AXIS: 22 DEGREES
EKG T AXIS: 40 DEGREES
EKG VENTRICULAR RATE: 90 BPM
EPITHELIAL CELLS, UA: 4 /HPF (ref 0–5)
GFR AFRICAN AMERICAN: >60
GFR NON-AFRICAN AMERICAN: >60
GLUCOSE BLD-MCNC: 89 MG/DL (ref 70–99)
GLUCOSE URINE: NEGATIVE MG/DL
HYALINE CASTS: 1 /LPF (ref 0–8)
KETONES, URINE: NEGATIVE MG/DL
LEUKOCYTE ESTERASE, URINE: ABNORMAL
MICROSCOPIC EXAMINATION: YES
NITRITE, URINE: NEGATIVE
PH UA: 6 (ref 5–8)
POTASSIUM REFLEX MAGNESIUM: 4.3 MMOL/L (ref 3.5–5.1)
PROTEIN UA: NEGATIVE MG/DL
RBC UA: 1 /HPF (ref 0–4)
SODIUM BLD-SCNC: 137 MMOL/L (ref 136–145)
SPECIFIC GRAVITY UA: 1.02 (ref 1–1.03)
URINE CULTURE, ROUTINE: NORMAL
URINE TYPE: ABNORMAL
UROBILINOGEN, URINE: 0.2 E.U./DL
WBC UA: 23 /HPF (ref 0–5)

## 2022-01-19 PROCEDURE — G0378 HOSPITAL OBSERVATION PER HR: HCPCS

## 2022-01-19 PROCEDURE — 95819 EEG AWAKE AND ASLEEP: CPT

## 2022-01-19 PROCEDURE — 80048 BASIC METABOLIC PNL TOTAL CA: CPT

## 2022-01-19 PROCEDURE — 2580000003 HC RX 258: Performed by: INTERNAL MEDICINE

## 2022-01-19 PROCEDURE — 6370000000 HC RX 637 (ALT 250 FOR IP): Performed by: INTERNAL MEDICINE

## 2022-01-19 PROCEDURE — 81001 URINALYSIS AUTO W/SCOPE: CPT

## 2022-01-19 PROCEDURE — 36591 DRAW BLOOD OFF VENOUS DEVICE: CPT

## 2022-01-19 PROCEDURE — 6370000000 HC RX 637 (ALT 250 FOR IP): Performed by: FAMILY MEDICINE

## 2022-01-19 PROCEDURE — A9577 INJ MULTIHANCE: HCPCS | Performed by: INTERNAL MEDICINE

## 2022-01-19 PROCEDURE — 96374 THER/PROPH/DIAG INJ IV PUSH: CPT

## 2022-01-19 PROCEDURE — 6360000004 HC RX CONTRAST MEDICATION: Performed by: INTERNAL MEDICINE

## 2022-01-19 PROCEDURE — 70553 MRI BRAIN STEM W/O & W/DYE: CPT

## 2022-01-19 PROCEDURE — 94760 N-INVAS EAR/PLS OXIMETRY 1: CPT

## 2022-01-19 PROCEDURE — 6360000002 HC RX W HCPCS: Performed by: NURSE PRACTITIONER

## 2022-01-19 PROCEDURE — 94640 AIRWAY INHALATION TREATMENT: CPT

## 2022-01-19 RX ORDER — ACETAMINOPHEN 650 MG/1
650 SUPPOSITORY RECTAL EVERY 6 HOURS PRN
Status: DISCONTINUED | OUTPATIENT
Start: 2022-01-19 | End: 2022-01-20 | Stop reason: HOSPADM

## 2022-01-19 RX ORDER — LANOLIN ALCOHOL/MO/W.PET/CERES
6 CREAM (GRAM) TOPICAL NIGHTLY PRN
Status: DISCONTINUED | OUTPATIENT
Start: 2022-01-19 | End: 2022-01-20 | Stop reason: HOSPADM

## 2022-01-19 RX ORDER — METHADONE HYDROCHLORIDE 10 MG/1
60 TABLET ORAL
Status: DISCONTINUED | OUTPATIENT
Start: 2022-01-19 | End: 2022-01-20 | Stop reason: HOSPADM

## 2022-01-19 RX ORDER — IPRATROPIUM BROMIDE AND ALBUTEROL SULFATE 2.5; .5 MG/3ML; MG/3ML
1 SOLUTION RESPIRATORY (INHALATION) EVERY 4 HOURS PRN
Status: DISCONTINUED | OUTPATIENT
Start: 2022-01-19 | End: 2022-01-19

## 2022-01-19 RX ORDER — LORAZEPAM 2 MG/ML
1 INJECTION INTRAMUSCULAR EVERY 6 HOURS PRN
Status: DISCONTINUED | OUTPATIENT
Start: 2022-01-19 | End: 2022-01-20 | Stop reason: HOSPADM

## 2022-01-19 RX ORDER — HYDRALAZINE HYDROCHLORIDE 20 MG/ML
10 INJECTION INTRAMUSCULAR; INTRAVENOUS EVERY 6 HOURS PRN
Status: DISCONTINUED | OUTPATIENT
Start: 2022-01-19 | End: 2022-01-20 | Stop reason: HOSPADM

## 2022-01-19 RX ORDER — ONDANSETRON 4 MG/1
4 TABLET, ORALLY DISINTEGRATING ORAL EVERY 8 HOURS PRN
Status: DISCONTINUED | OUTPATIENT
Start: 2022-01-19 | End: 2022-01-20 | Stop reason: HOSPADM

## 2022-01-19 RX ORDER — BUDESONIDE AND FORMOTEROL FUMARATE DIHYDRATE 160; 4.5 UG/1; UG/1
2 AEROSOL RESPIRATORY (INHALATION) 2 TIMES DAILY
Status: DISCONTINUED | OUTPATIENT
Start: 2022-01-19 | End: 2022-01-20 | Stop reason: HOSPADM

## 2022-01-19 RX ORDER — SODIUM CHLORIDE 9 MG/ML
25 INJECTION, SOLUTION INTRAVENOUS PRN
Status: DISCONTINUED | OUTPATIENT
Start: 2022-01-19 | End: 2022-01-20 | Stop reason: HOSPADM

## 2022-01-19 RX ORDER — SODIUM CHLORIDE 0.9 % (FLUSH) 0.9 %
5-40 SYRINGE (ML) INJECTION PRN
Status: DISCONTINUED | OUTPATIENT
Start: 2022-01-19 | End: 2022-01-20 | Stop reason: HOSPADM

## 2022-01-19 RX ORDER — ACETAMINOPHEN 325 MG/1
650 TABLET ORAL EVERY 6 HOURS PRN
Status: DISCONTINUED | OUTPATIENT
Start: 2022-01-19 | End: 2022-01-20 | Stop reason: HOSPADM

## 2022-01-19 RX ORDER — SODIUM CHLORIDE 0.9 % (FLUSH) 0.9 %
5-40 SYRINGE (ML) INJECTION EVERY 12 HOURS SCHEDULED
Status: DISCONTINUED | OUTPATIENT
Start: 2022-01-19 | End: 2022-01-20 | Stop reason: HOSPADM

## 2022-01-19 RX ORDER — POLYETHYLENE GLYCOL 3350 17 G/17G
17 POWDER, FOR SOLUTION ORAL DAILY PRN
Status: DISCONTINUED | OUTPATIENT
Start: 2022-01-19 | End: 2022-01-20 | Stop reason: HOSPADM

## 2022-01-19 RX ORDER — ALBUTEROL SULFATE 90 UG/1
2 AEROSOL, METERED RESPIRATORY (INHALATION) EVERY 4 HOURS PRN
Status: DISCONTINUED | OUTPATIENT
Start: 2022-01-19 | End: 2022-01-20 | Stop reason: HOSPADM

## 2022-01-19 RX ORDER — CIPROFLOXACIN 500 MG/1
500 TABLET, FILM COATED ORAL EVERY 12 HOURS SCHEDULED
Status: DISCONTINUED | OUTPATIENT
Start: 2022-01-19 | End: 2022-01-20 | Stop reason: HOSPADM

## 2022-01-19 RX ORDER — ONDANSETRON 2 MG/ML
4 INJECTION INTRAMUSCULAR; INTRAVENOUS EVERY 6 HOURS PRN
Status: DISCONTINUED | OUTPATIENT
Start: 2022-01-19 | End: 2022-01-20 | Stop reason: HOSPADM

## 2022-01-19 RX ADMIN — CIPROFLOXACIN 500 MG: 500 TABLET, FILM COATED ORAL at 20:41

## 2022-01-19 RX ADMIN — Medication 2 PUFF: at 17:55

## 2022-01-19 RX ADMIN — GADOBENATE DIMEGLUMINE 15 ML: 529 INJECTION, SOLUTION INTRAVENOUS at 14:35

## 2022-01-19 RX ADMIN — Medication 6 MG: at 03:17

## 2022-01-19 RX ADMIN — CIPROFLOXACIN 500 MG: 500 TABLET, FILM COATED ORAL at 03:12

## 2022-01-19 RX ADMIN — BUDESONIDE AND FORMOTEROL FUMARATE DIHYDRATE 2 PUFF: 160; 4.5 AEROSOL RESPIRATORY (INHALATION) at 20:56

## 2022-01-19 RX ADMIN — Medication 6 MG: at 20:41

## 2022-01-19 RX ADMIN — LORAZEPAM 1 MG: 2 INJECTION INTRAMUSCULAR; INTRAVENOUS at 17:54

## 2022-01-19 RX ADMIN — Medication 10 ML: at 08:17

## 2022-01-19 RX ADMIN — METHADONE HYDROCHLORIDE 60 MG: 10 TABLET ORAL at 08:08

## 2022-01-19 RX ADMIN — Medication 10 ML: at 20:41

## 2022-01-19 ASSESSMENT — PAIN SCALES - GENERAL
PAINLEVEL_OUTOF10: 7
PAINLEVEL_OUTOF10: 0
PAINLEVEL_OUTOF10: 7

## 2022-01-19 ASSESSMENT — PAIN DESCRIPTION - LOCATION: LOCATION: KNEE;LEG

## 2022-01-19 ASSESSMENT — PAIN DESCRIPTION - FREQUENCY: FREQUENCY: CONTINUOUS

## 2022-01-19 ASSESSMENT — PAIN DESCRIPTION - PROGRESSION: CLINICAL_PROGRESSION: GRADUALLY WORSENING

## 2022-01-19 ASSESSMENT — PAIN DESCRIPTION - ONSET: ONSET: ON-GOING

## 2022-01-19 ASSESSMENT — PAIN - FUNCTIONAL ASSESSMENT: PAIN_FUNCTIONAL_ASSESSMENT: ACTIVITIES ARE NOT PREVENTED

## 2022-01-19 ASSESSMENT — PAIN DESCRIPTION - DESCRIPTORS: DESCRIPTORS: ACHING

## 2022-01-19 ASSESSMENT — PAIN DESCRIPTION - PAIN TYPE: TYPE: ACUTE PAIN

## 2022-01-19 ASSESSMENT — PAIN DESCRIPTION - ORIENTATION: ORIENTATION: RIGHT;LEFT

## 2022-01-19 NOTE — PROGRESS NOTES
Patient was suppose to have MRI done this morning, but was not able to complete d/t having a electronic monitoring device on her ankle. Spoke with Herminia, the supervisor at the [facility]. She stated someone would be out to remove device as soon as possible. Patient back in bed, call light and bedside table within reach. Will continue to monitor and reassess.

## 2022-01-19 NOTE — ED PROVIDER NOTES
Triage note: I evaluated this patient to initiate their ED workup in an expeditious manner. Please see notes from other ED providers regarding comprehensive evaluation including full history, physical exam, interpretation of results, and medical decision making/disposition. History is mostly provided by the patient's father who is at the bedside. Her father called 911 just prior to arrival because the patient was standing at the sink doing dishes when she suddenly became nonresponsive and appeared to get very stiff and shaky. They assisted her over to a couch. She was nonresponsive for approximately 20 to 25 minutes. He called 911. She started to respond more when the paramedics arrived. She did not bite her tongue. There was no incontinence. No history of seizures. There is a history of drug abuse but the patient denies ingestion of any medications chemicals or substances. She is sitting in a wheelchair. She is awake and alert. She is slow to respond to questions. No dysarthria or aphasia. No pronator drift. I suspect that she is likely postictal.  No visible seizure activity. She was oriented to person and place. She was able to give the correct year. No facial droop. No pronator drift. Negative finger-nose. Negative heel-to-shin. NIH stroke scale was 0. CT head without contrast was ordered as well as metabolic work-up for altered mental status. There is a ankle monitoring device on her left leg. She does appear to be somewhat restless. Pupils are 3 mm and reactive. No nystagmus.      Lady Awan,   01/18/22 3388

## 2022-01-19 NOTE — RT PROTOCOL NOTE
RT Inhaler-Nebulizer Bronchodilator Protocol Note    There is a bronchodilator order in the chart from a provider indicating to follow the RT Bronchodilator Protocol and there is an Initiate RT Inhaler-Nebulizer Bronchodilator Protocol order as well (see protocol at bottom of note). CXR Findings:  XR CHEST PORTABLE    Result Date: 1/18/2022  Clear lungs. The findings from the last RT Protocol Assessment were as follows:   History Pulmonary Disease: Chronic pulmonary disease  Respiratory Pattern: Regular pattern and RR 12-20 bpm  Breath Sounds: Inspiratory and expiratory or bilateral wheezing and/or rhonchi  Cough: Strong, spontaneous, non-productive  Indication for Bronchodilator Therapy: Wheezing associated with pulm disorder  Bronchodilator Assessment Score: 8    Change to MDI's per patient request.  Pt requests PRN only at this time. Pt not currantly admitted to the  hospital for Asthma     Aerosolized bronchodilator medication orders have been revised according to the RT Inhaler-Nebulizer Bronchodilator Protocol below. Respiratory Therapist to perform RT Therapy Protocol Assessment initially then follow the protocol. Repeat RT Therapy Protocol Assessment PRN for score 0-3 or on second treatment, BID, and PRN for scores above 3. No Indications - adjust the frequency to every 6 hours PRN wheezing or bronchospasm, if no treatments needed after 48 hours then discontinue using Per Protocol order mode. If indication present, adjust the RT bronchodilator orders based on the Bronchodilator Assessment Score as indicated below. Use Inhaler orders unless patient has one or more of the following: on home nebulizer, not able to hold breath for 10 seconds, is not alert and oriented, cannot activate and use MDI correctly, or respiratory rate 25 breaths per minute or more, then use the equivalent nebulizer order(s) with same Frequency and PRN reasons based on the score.   If a patient is on this medication at home then do not decrease Frequency below that used at home. 0-3 - enter or revise RT bronchodilator order(s) to equivalent RT Bronchodilator order with Frequency of every 4 hours PRN for wheezing or increased work of breathing using Per Protocol order mode. 4-6 - enter or revise RT Bronchodilator order(s) to two equivalent RT bronchodilator orders with one order with BID Frequency and one order with Frequency of every 4 hours PRN wheezing or increased work of breathing using Per Protocol order mode. 7-10 - enter or revise RT Bronchodilator order(s) to two equivalent RT bronchodilator orders with one order with TID Frequency and one order with Frequency of every 4 hours PRN wheezing or increased work of breathing using Per Protocol order mode. 11-13 - enter or revise RT Bronchodilator order(s) to one equivalent RT bronchodilator order with QID Frequency and an Albuterol order with Frequency of every 4 hours PRN wheezing or increased work of breathing using Per Protocol order mode. Greater than 13 - enter or revise RT Bronchodilator order(s) to one equivalent RT bronchodilator order with every 4 hours Frequency and an Albuterol order with Frequency of every 2 hours PRN wheezing or increased work of breathing using Per Protocol order mode. RT to enter RT Home Evaluation for COPD & MDI Assessment order using Per Protocol order mode.     Electronically signed by Sierra Moreau RCP on 1/19/2022 at 5:52 PM

## 2022-01-19 NOTE — PROGRESS NOTES
Patient in bed, eyes closed and quiet. Patient needs assessed and addressed, patient denies any needs at this time. Pending EEG and MRI results pending. MD stated patient can be possibly discharged tomorrow. Call light and bedside table within reach. Will continue to monitor and reassess.   Electronically signed by Ander Hawkins RN on 1/19/2022 at 4:54 PM

## 2022-01-19 NOTE — PROGRESS NOTES
4 Eyes Skin Assessment     NAME:  Melanie Foss  YOB: 1970  MEDICAL RECORD NUMBER:  [id number]    The patient is being assess for  Admission    I agree that 2 RN's have performed a thorough Head to Toe Skin Assessment on the patient. ALL assessment sites listed below have been assessed. Areas assessed by both nurses:    Head, Face, Ears, Shoulders, Back, Chest, Arms, Elbows, Hands, Sacrum. Buttock, Coccyx, Ischium and Legs. Feet and Heels        Does the Patient have a Wound?  No noted wound(s)       Ramana Prevention initiated:  No   Wound Care Orders initiated:  No    Pressure Injury (Stage 3,4, Unstageable, DTI, NWPT, and Complex wounds) if present place consult order under [de-identified] No    New and Established Ostomies if present place consult order under : No      Nurse 1 eSignature: Electronically signed by Aziza Smart RN on 1/19/22 at 4:13 AM EST    **SHARE this note so that the co-signing nurse is able to place an eSignature**    Nurse 2 eSignature: Electronically signed by Yoko Cummings RN on 1/19/22 at 4:15 AM EST

## 2022-01-19 NOTE — PLAN OF CARE
Problem: Falls - Risk of:  Goal: Will remain free from falls  Description: Will remain free from falls  1/19/2022 0742 by Buster Downing RN  Outcome: Ongoing     Problem: Falls - Risk of:  Goal: Absence of physical injury  Description: Absence of physical injury  1/19/2022 0742 by Buster Downing RN  Outcome: Ongoing     Problem: OXYGENATION/RESPIRATORY FUNCTION  Goal: Patient will maintain patent airway  1/19/2022 0742 by Buster Downing RN  Outcome: Ongoing     Problem: OXYGENATION/RESPIRATORY FUNCTION  Goal: Patient will achieve/maintain normal respiratory rate/effort  Description: Respiratory rate and effort will be within normal limits for the patient  Outcome: Ongoing     Problem: FLUID AND ELECTROLYTE IMBALANCE  Goal: Fluid and electrolyte balance are achieved/maintained  1/19/2022 0742 by Buster Downing RN  Outcome: Ongoing     Problem: ACTIVITY INTOLERANCE/IMPAIRED MOBILITY  Goal: Mobility/activity is maintained at optimum level for patient  1/19/2022 0742 by Buster Downing RN      Problem: ACTIVITY INTOLERANCE/IMPAIRED MOBILITY  Goal: Mobility/activity is maintained at optimum level for patient  1/19/2022 0742 by Buster Downing RN  Outcome: Ongoing

## 2022-01-19 NOTE — CONSULTS
Neurology Consult Note  Reason for Consult: seizure like activity         Chief complaint: \"I'm told I had a seizure\"    Dr Piter Alanis MD asked me to see Suzie Em in consultation for evaluation of seizure like activity. History of Present Illness:  I obtained my information via the patient, supplemented with chart review. Suzie Em is a 46 y.o. female with hx of asthma, anxiety, depression, chronic pain syndrome,  COPD, fibromyalgia, who presented to the ED on 22 for evaluation of seizure like activity. On the day of admission the patient was reportedly standing at the sink doing dishes for which is the last thing the patient recalls. She felt to be at her normal health. It is then reported that her father had noticed she was holding the same cup in her hand for an extended period of time. She was non responsive, not following commands. She appeared to be stiff then had some sort of shaking movements. Family was able to assist her to the couch for which her unresponsiveness persisted for 20-25 minutes. EMS was notified for which upon their arrival the patient became more responsive, confused. There was not any noted incontinence, or tongue trauma, the patient is edentulous. On arrival to the ED the patient was awake and alert. She was slow to respond to questioning. There was some concern she was postictal. Neurological exam was non focal. Her lactic acid was normal. UA was concerning for underlying infection. She was admitted for further evaluation. At time of encounter the patient reports the next thing she recalls was being in the ED, she was able to figure out she was in the hospital. She did not know what had happened but did not feel particularly confused. She tells me that she has not been sleeping well and that she has had increased stress as her mother with whom she had lived with recently passed away. Her  service is tomorrow.  She also tells me she recently has been coughing up yellow phlegm which is new. Otherwise, denies any recent illness, fever. No prior personal or family history of seizure. There seems to be some hx of drug abuse, though she denies any recently. Her last drink of alcohol was several weeks ago. Ciro attends a methadone clinic for chronic pain management. Today she feels at baseline, nothing like this has happened before. No further activity of concern noted overnight. Medical History:  Past Medical History:   Diagnosis Date    Anxiety and depression     Arthritis     Asthma     Chronic pain syndrome     Complication of joint prosthesis (HCC)     COPD (chronic obstructive pulmonary disease) (HCC)     Failed back surgical syndrome     Failed neck syndrome     Fibromyalgia     GERD (gastroesophageal reflux disease)     Influenza A 2019    MDRO (multiple drug resistant organisms) resistance 2018    sputum    Obesity (BMI 30-39. 9)     Pneumonia      Past Surgical History:   Procedure Laterality Date    ABDOMEN SURGERY      BACK SURGERY       SECTION      x2    ENDOSCOPY, COLON, DIAGNOSTIC      HERNIA REPAIR      JOINT REPLACEMENT      right knee    KNEE ARTHROCENTESIS      removal of bilateral knees    SPINE SURGERY      5  back surgeries     UPPER GASTROINTESTINAL ENDOSCOPY  2015     Scheduled Meds:   budesonide-formoterol  2 puff Inhalation BID    sodium chloride flush  5-40 mL IntraVENous 2 times per day    enoxaparin  40 mg SubCUTAneous Daily    ciprofloxacin  500 mg Oral 2 times per day    methadone  60 mg Oral QAM AC     Continuous Infusions:   sodium chloride       PRN Meds:.ipratropium-albuterol, sodium chloride flush, sodium chloride, ondansetron **OR** ondansetron, polyethylene glycol, acetaminophen **OR** acetaminophen, melatonin    Medications Prior to Admission: albuterol sulfate  (90 Base) MCG/ACT inhaler, Inhale 2 puffs into the lungs every 4 hours as needed for Wheezing or Shortness of Breath  ipratropium-albuterol (DUONEB) 0.5-2.5 (3) MG/3ML SOLN nebulizer solution, Take 3 mLs by nebulization every 4 hours as needed for Shortness of Breath  albuterol sulfate  (90 Base) MCG/ACT inhaler, INHALE 2 PUFFS INTO THE LUNGS EVERY 4 HOURS AS NEEDED FOR WHEEZING OR SHORTNESS OF BREATH  zolpidem (AMBIEN) 5 MG tablet, Take 5 mg by mouth nightly as needed for Sleep. Namita Colder ibuprofen (ADVIL;MOTRIN) 600 MG tablet, Take 600 mg by mouth every 8 hours as needed   budesonide-formoterol (SYMBICORT) 160-4.5 MCG/ACT AERO, INHALE TWO PUFFS BY MOUTH TWICE A DAY -- MUST BE SEEN for future refills  oxymetazoline (AFRIN) 0.05 % nasal spray, 2 sprays by Nasal route 2 times daily as needed for Congestion  methadone (DOLOPHINE) 10 MG tablet, Take 60 mg by mouth daily. Between 070-0800  fluticasone (FLONASE) 50 MCG/ACT nasal spray, 1 spray by Nasal route nightly   predniSONE (DELTASONE) 20 MG tablet, Take 40 mg daily for 5 days, then 20 mg daily for 5 days, then 10 mg daily for 4 days, then stop  Umeclidinium Bromide (INCRUSE ELLIPTA) 62.5 MCG/INH AEPB, Inhale 1 puff into the lungs daily  theophylline (DARCY-24) 200 MG extended release capsule, Take 1 capsule by mouth daily (Patient not taking: Reported on 6/6/2019)  ALPRAZolam (XANAX) 1 MG tablet, Take 1 mg by mouth 4 times daily. 0400, 0800, 1400, 2000.   oxyCODONE (OXY-IR) 15 MG immediate release tablet, Take 15 mg by mouth every 4 hours as needed for Pain    Allergies   Allergen Reactions    Gabapentin Other (See Comments)     Confused    Penicillins      Swelling    Morphine Rash    Morphine And Related Rash       Family History   Problem Relation Age of Onset    COPD Mother     Asthma Father        Social History     Tobacco Use   Smoking Status Never Smoker   Smokeless Tobacco Never Used   Tobacco Comment    encouraged to never smoke      Social History     Substance and Sexual Activity   Drug Use Yes    Types: Opiates Comment: snort heroine     Social History     Substance and Sexual Activity   Alcohol Use No    Alcohol/week: 0.0 standard drinks       ROS:  Constitutional- No weight loss or fevers  Eyes- No diplopia. No photophobia. Ears/nose/throat- No dysphagia. No Dysarthria  Cardiovascular- No palpitations. No chest pain  Respiratory- No dyspnea. + Cough +recent yellow phlegm. Gastrointestinal- No Abdominal pain. No Vomiting. Genitourinary- No incontinence. No urinary retention  Musculoskeletal- + chronic myalgia. + chronic arthralgia  Skin- No rash. No easy bruising. Psychiatric- + depression, surrounding recent death of her mother. No SI. No anxiety  Endocrine- No diabetes. No thyroid issues. Hematologic- No bleeding difficulty. No fatigue  Neurologic- + chronic BLE weakness. No Headache. Exam:  Vitals:    01/19/22 0039 01/19/22 0450 01/19/22 0729 01/19/22 0813   BP: (!) 143/89 (!) 162/95 (!) 159/105    Pulse: 105 82 84 84   Resp: 22 13 17    Temp: 98.6 °F (37 °C) 98.3 °F (36.8 °C) 98 °F (36.7 °C)    TempSrc: Oral Oral Oral    SpO2: 93% 98% 97%    Weight: 172 lb 6.4 oz (78.2 kg)      Height:          Constitutional    Vital signs: BP, HR, and RR reviewed   General Alert, no distress, well-nourished  Eyes: unable to visualize the fundi  Cardiovascular: pulses symmetric in all 4 extremities. No peripheral edema. Psychiatric: cooperative with examination, no  psychotic behavior noted. Neurologic  Mental status:   orientation to person, place, time and situation   General fund of knowledge:  grossly intact   Memory: Short term and long term intact   Attention intact as able to attend well to the exam     Language fluent in conversation. No aphasia. Comprehension intact; follows simple commands  Cranial nerves:   CN2: Visual Fields full w/o extinction on confrontational testing  CN 3,4,6: Very subtle left eye esotropia on primary gaze.  extraocular muscles intact, she does have a few very subtle beats of nystagmus when looking left laterally. PERRLA @ 3mm. CN5: V1: V2: V3: intact to light touch sensation bilaterally  CN7: upper and lower facial symmetric without dysarthria. She is edentulous. CN8: hearing grossly intact to conversation. CN9/10: palate elevated symmetrically  CN11: trap full strength on shoulder shrug bilaterally, SCM without weakness. CN12: tongue midline with protrusion. Able to move tongue side to side. No obvious tongue trauma. Strength: Grasp: BUE 5/5 . RUE: 5/5 Shoulder abduction, elbow flexion, elbow extension. LUE: 5/5  Shoulder abduction, elbow flexion, elbow extension. Dorsiflexion: R 5/5, L 5/5 ;  Plantar flexion: R 5/5, L 5/5  RLE: 5/5 Hip flexion, Knee flexion, Knee extension. LLE: 5/5 Hip flexion, Knee flexion, Knee extension. Deep tendon reflexes:   R Bicep: 1-2+  R Brachioradialis: 1-2+ R Patellar: s/p significant knee surgery. R Babinski: Toes down  L Bicep 1-2+ L Brachioradialis[de-identified] 1-2+ L Patellar: s/p significant knee surgery. L Babinski: Toes mute  Sensory: light touch intact in all 4 extremities. No sensory extinction on double simultaneous stimulation  Cerebellar/coordination: finger nose finger normal without ataxia  Tone: normal in all 4 extremities  Gait: normal gait    Labs  Na: 137  K: 4.3  BUN: 22  Creatinine: 0.9  Glucose: 89  Calcium: 8.2    AST: 18  ALT: 16  Ammonia: 26    WBC: 8.6  RBC: 4.54  Hgb: 12.9  Hct: 39.1  Platelet: 491    EtOH: 11  Urine Tox: Positive for methadone. UA 1/18/22: Moderate leukocyte esterase. Negative for nitrites. 61 WBC. 1+ Bacteria. UA 1/19/22: Moderate for leukocyte esterase. Negative for nitrites. 23 WBC. Urine Culture: pending     Studies  CT Head w/o 1/18/22: Independently reviewed. No acute intracranial abnormality. Impression  1. Acute prolonged unresponsiveness with reported adventitious movement with impaired recall. No incontinence, no tongue trauma. 2. UTI  3. Opiate dependence.     Edwin Must Milly Beauchamp is a 46 y.o. female with no prior seizure history who presented to the who presents with acute prolonged unresponsiveness with reported adventitious movement with impaired recall. No incontinence, no tongue trauma. She recently reports poor sleep and increase stress surrounding the recent death of her mother. Also found to have UTI on admission. CT head was without acute findings. She is back to normal mentation with no reoccurence. Grossly non focal exam, she does have some mild RLE weakness compared to LLE, she does have chronic significant joint and back pain including knees. Etiology: Cannot exclude seizure event, would consider provoked in setting of recent reported stress, poor sleep and UTI. Recommendations  - Awaiting MRI Brain w/ w/o, routine EEG  - No AED indicated at this time. Would need to reconsider if abnormal testing or if further events were to persist    - Inpatient seizure precautions. Monitor for and note any activity concerning for seizure including symptoms and duration.   - Metabolic and infectious work up/management per primary team. Treatment for UTI underway. - Telemetry monitoring.   - Given loss of consciousness, reported seizure like activity. The patient should avoid driving for at least 3 months, episode free with clearance from a physician, as well as refraining from activities that could be of danger to herself or others should she have another event such as swimming, bathing, operating heavy machinery, climbing heights or watching young children who do not have the ability to call for help if needed. - Follow up with Neurology in 3 months or sooner if needed. - Neurology attending will see this afternoon.        Erin Hermosillo, [address] Neurology    A copy of this note was provided for Dr Shonna Irving MD

## 2022-01-19 NOTE — ED PROVIDER NOTES
EMERGENCY DEPARTMENT ENCOUNTER        Pt Name: Mary Rae  MRN: [id number]  [place] 1970  Date of evaluation: 1/18/2022  Provider: TIFFANIE Forrest  PCP: Laura Whaley  Note Started: 10:18 PM EST       FRITZ. I have evaluated this patient. My supervising physician was available for consultation. CHIEF COMPLAINT       Chief Complaint   Patient presents with    Altered Mental Status     family states that the patient stiffened up while doind dishes then had shaking, pt altered at time of triage       HISTORY OF PRESENT ILLNESS   (Location, Timing/Onset, Context/Setting, Quality, Duration, Modifying Factors, Severity, Associated Signs and Symptoms)  Note limiting factors. Chief Complaint: Moncho Astudillo is a 46 y.o. female who presents to the emergency department today with concerns for altered mental status, stiffness. Patient was doing dishes today, when her father noticed that she was holding the same cup in her hand for an extended period of time, and shaking a little bit. When he went to try to get her to move, she was very rigid and would not follow commands. They were able to get her into a couch in a seated position where she sat with her eyes closed for about 25 minutes, not responding to their questions or commands. She then woke up slightly confused. She was brought in for evaluation. He states that this is never happened before. She did had no bowel or bladder incontinence during the episode. Patient does not recall anything related to the episode, she recalls a few events leading up to it, some people who are at the house with her, but just remembers waking up in the hospital being told that this is what happened to her. Patient does not have a seizure history. She does attend a methadone clinic, states her last dose was sometime yesterday. She has no chest pain, abdominal pain, nausea or vomiting. She denies numbness, tingling, weakness in her extremities. MCG/ACT inhaler Inhale 2 puffs into the lungs every 4 hours as needed for Wheezing or Shortness of Breath  Qty: 1 Inhaler, Refills: 3      ipratropium-albuterol (DUONEB) 0.5-2.5 (3) MG/3ML SOLN nebulizer solution Take 3 mLs by nebulization every 4 hours as needed for Shortness of Breath  Qty: 360 mL, Refills: 3      !! albuterol sulfate  (90 Base) MCG/ACT inhaler INHALE 2 PUFFS INTO THE LUNGS EVERY 4 HOURS AS NEEDED FOR WHEEZING OR SHORTNESS OF BREATH  Qty: 3 Inhaler, Refills: 0    Comments: **Patient requests 90 days supply**      zolpidem (AMBIEN) 5 MG tablet Take 5 mg by mouth nightly as needed for Sleep. Clement Fly ibuprofen (ADVIL;MOTRIN) 600 MG tablet Take 600 mg by mouth every 8 hours as needed       budesonide-formoterol (SYMBICORT) 160-4.5 MCG/ACT AERO INHALE TWO PUFFS BY MOUTH TWICE A DAY -- MUST BE SEEN for future refills  Qty: 1 Inhaler, Refills: 1    Associated Diagnoses: Acute exacerbation of COPD with asthma ([facility])      oxymetazoline (AFRIN) 0.05 % nasal spray 2 sprays by Nasal route 2 times daily as needed for Congestion      methadone (DOLOPHINE) 10 MG tablet Take 60 mg by mouth daily. Between 070-0800      fluticasone (FLONASE) 50 MCG/ACT nasal spray 1 spray by Nasal route nightly     Associated Diagnoses: Chronic pain syndrome; Fibromyalgia; Failed back surgical syndrome; Failed neck syndrome; Complication of joint prosthesis ([facility])      predniSONE (DELTASONE) 20 MG tablet Take 40 mg daily for 5 days, then 20 mg daily for 5 days, then 10 mg daily for 4 days, then stop  Qty: 17 tablet, Refills: 0      Umeclidinium Bromide (INCRUSE ELLIPTA) 62.5 MCG/INH AEPB Inhale 1 puff into the lungs daily  Qty: 1 each, Refills: 0      theophylline (DARCY-24) 200 MG extended release capsule Take 1 capsule by mouth daily  Qty: 30 capsule, Refills: 3      ALPRAZolam (XANAX) 1 MG tablet Take 1 mg by mouth 4 times daily. 0400, 0800, 1400, 2000.       oxyCODONE (OXY-IR) 15 MG immediate release tablet Take 15 mg by mouth every 4 hours as needed for Pain       !! - Potential duplicate medications found. Please discuss with provider. ALLERGIES     Gabapentin, Penicillins, Morphine, and Morphine and related    FAMILYHISTORY       Family History   Problem Relation Age of Onset    COPD Mother     Asthma Father           SOCIAL HISTORY       Social History     Tobacco Use    Smoking status: Never Smoker    Smokeless tobacco: Never Used    Tobacco comment: encouraged to never smoke    Vaping Use    Vaping Use: Never used   Substance Use Topics    Alcohol use: No     Alcohol/week: 0.0 standard drinks    Drug use: Yes     Types: Opiates      Comment: snort heroine       SCREENINGS             PHYSICAL EXAM    (up to 7 for level 4, 8 or more for level 5)     ED Triage Vitals [01/18/22 2034]   BP Temp Temp Source Pulse Resp SpO2 Height Weight   (!) 147/74 98 °F (36.7 °C) Temporal 93 18 98 % -- --       Physical Exam  Vitals and nursing note reviewed. Constitutional:       General: She is not in acute distress. Appearance: She is well-developed. She is not ill-appearing, toxic-appearing or diaphoretic. HENT:      Head: Normocephalic and atraumatic. Mouth/Throat:      Mouth: Mucous membranes are moist.      Pharynx: Oropharynx is clear. Eyes:      Extraocular Movements: Extraocular movements intact. Right eye: No nystagmus. Left eye: No nystagmus. Conjunctiva/sclera: Conjunctivae normal.      Pupils: Pupils are equal, round, and reactive to light. Cardiovascular:      Rate and Rhythm: Normal rate and regular rhythm. Heart sounds: Normal heart sounds. Pulmonary:      Effort: Pulmonary effort is normal. No respiratory distress. Breath sounds: Normal breath sounds. Abdominal:      Palpations: Abdomen is soft. Musculoskeletal:         General: Normal range of motion. Cervical back: Normal range of motion and neck supple.       Comments: Monitoring device noted to left ankle Skin:     General: Skin is warm and dry. Neurological:      Mental Status: She is alert and oriented to person, place, and time. GCS: GCS eye subscore is 4. GCS verbal subscore is 5. GCS motor subscore is 6. Cranial Nerves: No cranial nerve deficit or facial asymmetry. Sensory: No sensory deficit. Motor: No weakness. Coordination: Coordination normal.      Deep Tendon Reflexes: Reflexes normal.   Psychiatric:         Mood and Affect: Mood normal.         Behavior: Behavior normal. Behavior is cooperative. Thought Content:  Thought content normal.         DIAGNOSTIC RESULTS   LABS:    Labs Reviewed   CBC WITH AUTO DIFFERENTIAL - Abnormal; Notable for the following components:       Result Value    Eosinophils Absolute 1.0 (*)     All other components within normal limits    Narrative:     Performed at:  [address] [place] [address]   Phone [phone]   COMPREHENSIVE METABOLIC PANEL W/ REFLEX TO MG FOR LOW K - Abnormal; Notable for the following components:    Potassium reflex Magnesium 5.2 (*)     BUN 27 (*)     CREATININE 1.2 (*)     GFR Non- 47 (*)     GFR  57 (*)     All other components within normal limits    Narrative:     Performed at:  [address] [facility] [employer] [place] [address]   Phone [phone]   URINE RT REFLEX TO CULTURE - Abnormal; Notable for the following components:    Clarity, UA CLOUDY (*)     Leukocyte Esterase, Urine MODERATE (*)     All other components within normal limits    Narrative:     Performed at:  [address] [employer] [place] [address]   Phone [phone]   BLOOD GAS, VENOUS - Abnormal; Notable for the following components:    pH, Evaristo 7.268 (*)     pCO2, Evaristo 62.0 (*)     All other components within normal limits    Narrative:     Performed at:  [facility]  [address] [facility] [employer] [address]   Phone [phone]   URINE DRUG SCREEN - Abnormal; Notable for the following components:    Methadone Screen, Urine POSITIVE (*)     All other components within normal limits    Narrative:     Performed at:  [facility]  [address] [facility] [employer] [address]   Phone [phone]   ACETAMINOPHEN LEVEL - Abnormal; Notable for the following components:    Acetaminophen Level <5 (*)     All other components within normal limits    Narrative:     Performed at:  [facility]  [address] [facility] [employer] [address]   Phone [phone]   SALICYLATE LEVEL - Abnormal; Notable for the following components:    Salicylate, Serum <3.4 (*)     All other components within normal limits    Narrative:     Performed at:  [facility]  [address] [facility] [employer] [address]   Phone [phone]   MICROSCOPIC URINALYSIS - Abnormal; Notable for the following components:    Bacteria, UA 1+ (*)     Hyaline Casts, UA 17 (*)     WBC, UA 61 (*)     Epithelial Cells, UA 12 (*)     All other components within normal limits    Narrative:     Performed at:  [facility]  [address] [facility] [employer] [address]   Phone [phone]   URINALYSIS - Abnormal; Notable for the following components:    Clarity, UA CLOUDY (*)     Leukocyte Esterase, Urine MODERATE (*)     All other components within normal limits    Narrative:     Performed at:  [facility]  [address] [facility] [employer] [address]   Phone [phone]   MICROSCOPIC URINALYSIS - Abnormal; Notable for the following components:    WBC, UA 23 (*)     All other components within normal limits    Narrative:     Performed at:  [facility]  [address] [facility] [employer] [address]   Phone [phone] CULTURE, URINE   TROPONIN    Narrative:     Performed at:  [facility]  [address] [facility] [place] [address]   Phone [phone]   LACTATE, SEPSIS    Narrative:     Performed at:  [facility]  [address] [facility] [address] [place] [address]   Phone [phone]   ETHANOL    Narrative:     Performed at:  [facility]  [address] [facility] [place] [address]   Phone [phone]   AMMONIA    Narrative:     Performed at:  [facility]  [address] [facility] [place] [address]   Phone [phone]   BASIC METABOLIC PANEL W/ REFLEX TO MG FOR LOW K       When ordered only abnormal lab results are displayed. All other labs were within normal range or not returned as of this dictation. EKG: When ordered, EKG's are interpreted by the Emergency Department Physician in the absence of a cardiologist.  Please see their note for interpretation of EKG. RADIOLOGY:   Non-plain film images such as CT, Ultrasound and MRI are read by the radiologist. Plain radiographic images are visualized and preliminarily interpreted by the ED Provider with the below findings:        Interpretation per the Radiologist below, if available at the time of this note:    CT Head WO Contrast   Final Result   No acute intracranial abnormality. RECOMMENDATIONS:   Unavailable         XR CHEST PORTABLE   Final Result   Clear lungs. CT Head WO Contrast    Result Date: 1/18/2022  EXAMINATION: CT OF THE HEAD WITHOUT CONTRAST  1/18/2022 9:08 pm TECHNIQUE: CT of the head was performed without the administration of intravenous contrast. Dose modulation, iterative reconstruction, and/or weight based adjustment of the mA/kV was utilized to reduce the radiation dose to as low as reasonably achievable.  COMPARISON: CT head without contrast, 06/24/2019 HISTORY: ORDERING SYSTEM PROVIDED HISTORY: altered mental status TECHNOLOGIST PROVIDED HISTORY: Reason for exam:->altered mental status Has a \"code stroke\" or \"stroke alert\" been called? ->No Decision Support Exception - unselect if not a suspected or confirmed emergency medical condition->Emergency Medical Condition (MA) Is the patient pregnant?->No Reason for Exam: ams FINDINGS: BRAIN/VENTRICLES: There is no acute intracranial hemorrhage, mass effect or midline shift. No abnormal extra-axial fluid collection. The gray-white differentiation is maintained without evidence of an acute infarct. There is no evidence of hydrocephalus. ORBITS: The visualized portion of the orbits demonstrate no acute abnormality. SINUSES: The visualized paranasal sinuses and mastoid air cells demonstrate no acute abnormality. SOFT TISSUES/SKULL:  No acute abnormality of the visualized skull or soft tissues. No acute intracranial abnormality. RECOMMENDATIONS: Unavailable     XR CHEST PORTABLE    Result Date: 1/18/2022  EXAMINATION: ONE XRAY VIEW OF THE CHEST 1/18/2022 8:48 pm COMPARISON: None. HISTORY: ORDERING SYSTEM PROVIDED HISTORY: altered mental status TECHNOLOGIST PROVIDED HISTORY: Reason for exam:->altered mental status Reason for Exam: ams FINDINGS: Clear lungs. No pleural effusion or pneumothorax. Cardiomediastinal silhouette is unremarkable. Visualized osseous structures are unremarkable. Right chest wall port tip in the right atrium. Clear lungs.            PROCEDURES   Unless otherwise noted below, none     Procedures    CONSULTS:  IP CONSULT TO NEUROLOGY  IP CONSULT TO PHARMACY      EMERGENCY DEPARTMENT COURSE and DIFFERENTIAL DIAGNOSIS/MDM:   Vitals:    Vitals:    01/18/22 2034 01/19/22 0020 01/19/22 0030 01/19/22 0039   BP: (!) 147/74 (!) 129/91  (!) 143/89   Pulse: 93 85  105   Resp: 18 18 22   Temp: 98 °F (36.7 °C)   98.6 °F (37 °C)   TempSrc: Temporal   Oral   SpO2: 98% 97%  93%   Height:   5' (1.524 m)        Patient was given the following medications:  Medications budesonide-formoterol (SYMBICORT) 160-4.5 MCG/ACT inhaler 2 puff (has no administration in time range)   ipratropium-albuterol (DUONEB) nebulizer solution 3 mL (has no administration in time range)   sodium chloride flush 0.9 % injection 5-40 mL (has no administration in time range)   sodium chloride flush 0.9 % injection 5-40 mL (has no administration in time range)   0.9 % sodium chloride infusion (has no administration in time range)   enoxaparin (LOVENOX) injection 40 mg (has no administration in time range)   ondansetron (ZOFRAN-ODT) disintegrating tablet 4 mg (has no administration in time range)     Or   ondansetron (ZOFRAN) injection 4 mg (has no administration in time range)   polyethylene glycol (GLYCOLAX) packet 17 g (has no administration in time range)   acetaminophen (TYLENOL) tablet 650 mg (has no administration in time range)     Or   acetaminophen (TYLENOL) suppository 650 mg (has no administration in time range)   ciprofloxacin (CIPRO) tablet 500 mg (has no administration in time range)           ED COURSE & MEDICAL DECISION MAKING    - The patient presented to the ER with complaints of episode of altered mental status, stiffness. Vital signs were reviewed. Exam as above. Peripheral IV placed. Labs, Imaging ordered. - Pertinent Labs & Imaging studies reviewed. (See chart for details)   -  Patient seen and evaluated in the emergency department. -  Triage and nursing notes reviewed and incorporated. -  Old chart records reviewed and incorporated. -  FRITZ. I have evaluated this patient. My supervising physician was available for consultation.  -  Differential diagnosis includes: stroke, TIA, intracranial bleed, trauma, infection/sepsis, medication side effect, intoxication/withdrawal, metabolic/electrolyte abnormalities  -  Work-up included:  See above  - Consults: Hospitalist for admission  -  Results discussed with patient and/or family.   Labs show no leukocytosis or concerning anemia, metabolic panel with sodium of 137, potassium 5.2, BUN 27, creatinine 1.2, GFR 47. Troponin is less than 0.01. Lactic acid is not elevated at 1.0. VBG appears to be about baseline, pH is 7.26, PCO2 62, HCO3 of 28. Ethanol is 11, drug screen is positive for methadone, consistent with her history that she provided. Acetaminophen level is less than 5, salicylate is less than 0.3, ammonia levels not elevated at 26. Urine appears contaminated, will defer treatment pending culture results after discussing with the hospitalist. Imaging studies show no acute process on chest x-ray. CT of the head with no acute intracranial abnormality. Patient feels much improved, and per her father, who is at bedside, she does appear to be back at baseline. However, given the history of her having such an extended period of time where she was altered, and not quite responsive with some abnormal shaking, I do think she would benefit from admission for further evaluation and management. The patient and/or family is agreeable with plan of care and disposition.  -  Disposition:   Admission  - Critical Care: Because of high probability of sudden clinical deterioration of the patient's condition or  further deterioration, critical care time included my full attention to the patient's condition, including chart data review, documentation, medication ordering, reviewing the patient's old records, reevaluation patient's cardiac, pulmonary and neurological status. Reassessment of vital signs. Consultations with ED attending and admitting physician. Ordering, interpreting reviewing diagnostic testing. Therefore, my critical care time was 31 minutes of direct attention to the patient's condition did not include time spent on separately billable procedures. FINAL IMPRESSION      1. Altered mental status, unspecified altered mental status type    2.  Urinary tract infection without hematuria, site unspecified          DISPOSITION/PLAN

## 2022-01-19 NOTE — PROGRESS NOTES
PT AAO x4 per this shift. VS. Pt tolerating diet. U/A recollected per MD orders. Pt erasmo pain. Pt able to walk with steady gait with nursing staff. Pt requesting melatonin and methadone. Pt stating she now take 60 mg once a day of methadone between 2780-5534. JEZ Carey notified with [facility]. MD JEAN BAPTISTE, [address] [facility] notified. See eMAR with NO. Pharmacy stating they would to contact clinic to double check new dosage. Pt erasmo any other needs. Fall precautions in place. Bed alarm on. Call light within reach. Will continue to round.  Electronically signed by Sarah Weston RN on 1/19/2022 at 6:19 AM

## 2022-01-19 NOTE — PROGRESS NOTES
Patient in bed, awake, a&ox4. Patient took medications whole, no complications. Patient having pain in legs/knees rating 7/10. Scheduled pain medication given as ordered. Patient eating breakfast currently and tolerating, no reports of n/v. Patient port flushed and capped. Patient able to make needs known. Denies any needs at this time. Call light and bedside table within reach. Will continue to monitor and reassess.

## 2022-01-19 NOTE — H&P
Hospital Medicine History & Physical      PCP: Stephanie Tee    Date of Admission: 1/18/2022    Date of Service: Pt seen/examined on 1/18/2020 and Placed in Observation. Chief Complaint: Seizure-like activity at home      History Of Present Illness:     46 y.o. female who has a past medical history of opiate dependence on methadone, anxiety and depression, chronic pain syndrome, prior orthopedic surgeries  Her mother passed away last Thursday due to pneumonia, father lives in [place] has come to town to help clean up the house  He was noted to be washing dishes when she suddenly became quiet when father asked she was unable to respond, eyes were closed, he tried to take her to the couch and noted that her hands were stiff, he made her lay on the couch hands and she remained unresponsive for 10 minutes. She did not have any urinary bowel incontinence. No biting of tongue. EMS was called brought the patient to the emergency room. Patient was back to her baseline when in the ED. Work-up included a UA which had epithelial cells so it is being repeated. She has no history of seizures, she has no family history of seizures. She denies any drug use and this is confirmed with a urine drug screen which is only positive methadone. Patient was admitted to hospital for further monitoring and neurology evaluation        Past Medical History:          Diagnosis Date    Anxiety and depression     Arthritis     Asthma     Chronic pain syndrome     Complication of joint prosthesis ([address]     COPD (chronic obstructive pulmonary disease) (HCC)     Failed back surgical syndrome     Failed neck syndrome     Fibromyalgia     GERD (gastroesophageal reflux disease)     Influenza A 02/22/2019    MDRO (multiple drug resistant organisms) resistance 04/28/2018    sputum    Obesity (BMI 30-39. 9)     Pneumonia        Past Surgical History:          Procedure Laterality Date    ABDOMEN SURGERY      BACK SURGERY 4 times daily. 0400, 0800, 1400, 2000. Historical Provider, MD   oxyCODONE (OXY-IR) 15 MG immediate release tablet Take 15 mg by mouth every 4 hours as needed for Pain    Historical Provider, MD   methadone (DOLOPHINE) 10 MG tablet Take 20-30 mg by mouth 2 times daily. 30mg at 0400, 20mg at 1600    Historical Provider, MD   fluticasone (FLONASE) 50 MCG/ACT nasal spray 1 spray by Nasal route nightly     Historical Provider, MD       Allergies:  Gabapentin, Penicillins, Morphine, and Morphine and related    Social History:      The patient currently lives at home alone, father is here from [address] [place] [address], mother passed away last week    TOBACCO:   reports that she has never smoked. She has never used smokeless tobacco.  ETOH:   reports no history of alcohol use. Family History:          Problem Relation Age of Onset    COPD Mother     Asthma Father        REVIEW OF SYSTEMS:   Pertinent positives as noted in the HPI. All other systems reviewed and negative. PHYSICAL EXAM PERFORMED:    BP (!) 147/74   Pulse 93   Temp 98 °F (36.7 °C) (Temporal)   Resp 18   SpO2 98%     General appearance:  No apparent distress, appears stated age and cooperative. HEENT:  Normal cephalic, atraumatic without obvious deformity. Pupils equal, round, and reactive to light. Extra ocular muscles intact. Conjunctivae/corneas clear. Neck: Supple, with full range of motion. No jugular venous distention. Trachea midline. Respiratory:  Normal respiratory effort. Clear to auscultation, bilaterally without Rales/Wheezes/Rhonchi. Cardiovascular:  Regular rate and rhythm with normal S1/S2 without murmurs, rubs or gallops. Abdomen: Soft, non-tender, non-distended with normal bowel sounds. Musculoskeletal:  No clubbing, cyanosis or edema bilaterally. Full range of motion without deformity. Skin: Skin color, texture, turgor normal.  No rashes or lesions. Neurologic:  Neurovascularly intact without any focal sensory/motor deficits. Cranial nerves: II-XII intact, grossly non-focal.  Psychiatric:  Alert and oriented, thought content appropriate, normal insight  Capillary Refill: Brisk,< 3 seconds   Peripheral Pulses: +2 palpable, equal bilaterally       Labs:     Recent Labs     01/18/22 2051   WBC 8.6   HGB 12.9   HCT 39.1        Recent Labs     01/18/22 2051      K 5.2*      CO2 24   BUN 27*   CREATININE 1.2*   CALCIUM 8.7     Recent Labs     01/18/22 2051   AST 18   ALT 16   BILITOT <0.2   ALKPHOS 95     No results for input(s): INR in the last 72 hours. Recent Labs     01/18/22 2051   TROPONINI <0.01       Urinalysis:      Lab Results   Component Value Date    NITRU Negative 01/18/2022    WBCUA 61 01/18/2022    BACTERIA 1+ 01/18/2022    RBCUA 4 01/18/2022    BLOODU Negative 01/18/2022    SPECGRAV 1.019 01/18/2022    GLUCOSEU Negative 01/18/2022    GLUCOSEU NEGATIVE 06/02/2010       Radiology:     CXR: I have reviewed the CXR with the following interpretation: No consolidation effusion pneumothorax  EKG:  I have reviewed the EKG with the following interpretation: Normal sinus rhythm ventricular of 90    CT Head WO Contrast   Final Result   No acute intracranial abnormality. RECOMMENDATIONS:   Unavailable         XR CHEST PORTABLE   Final Result   Clear lungs.              ASSESSMENT:    Active Hospital Problems    Diagnosis Date Noted    Seizure-like activity ([facility] [R56.9] 01/18/2022     Seizure-like activity; he is going through a very stressful time with recently mother passed away  No prior history of seizures, no family history of seizures  We will get an EEG  Neurology evaluation  MRI without contrast has been ordered further evaluation    Abnormal UA concerning for UTI  Initially UA showed some epithelial cells, this was repeated she still has some mild leukocytosis  Start ciprofloxacin  Follow-up urine cultures    Opiate dependence  Continue home methadone dose    DVT Prophylaxis: Lovenox  Diet: ADULT DIET; Regular  Code Status: Full Code    PT/OT Eval Status: Not applicable    Dispo - possible discharge tomorrow if cleared by neurology       Suzanne Bergeron MD   Hospitalist    Thank you Dave Carroll for the opportunity to be involved in this patient's care. If you have any questions or concerns please feel free to contact me at [phone].

## 2022-01-19 NOTE — PROGRESS NOTES
Pt arrived to floor from ER at [id number] stretcher. Pt oriented to room, call light, policies and procedures, the menu and ordering. Call light within reach. Bed in lowest position, bed alarm on, and wheels locked. Pt verbalized understanding. No complaints, questions or concerns at this time.

## 2022-01-19 NOTE — PROGRESS NOTES
Hospital Medicine Progress Note      Admit Date: 2022       CC: F/U for seizure like activity at home     HPI: 46 y.o. female who has a past medical history of opiate dependence on methadone, anxiety and depression, chronic pain syndrome, prior orthopedic surgeries  Her mother passed away last Thursday due to pneumonia, father lives in [place] has come to town to help clean up the house  He was noted to be washing dishes when she suddenly became quiet when father asked she was unable to respond, eyes were closed, he tried to take her to the couch and noted that her hands were stiff, he made her lay on the couch hands and she remained unresponsive for 10 minutes. She did not have any urinary bowel incontinence. No biting of tongue. EMS was called brought the patient to the emergency room. Patient was back to her baseline when in the ED. Work-up included a UA which had epithelial cells so it is being repeated. She has no history of seizures, she has no family history of seizures. She denies any drug use and this is confirmed with a urine drug screen which is only positive methadone. Patient was admitted to hospital for further monitoring and neurology evaluation    Interval History/Subjective: patient states she does not remember the events but states her father told her she had her arms out in front of her and was unresponsive. She came to at the hospital and felt very tired afterwards. She takes methadone at home, but denies alcohol or drug use otherwise. She has no prior hx of seizures. EEG was neg. Neurology wants to hold on AEDs at this time. urine drug screen Neg except for her known methadone    Will keep her overnight to monitor x24 hours and plan for d/c tomorrow. Patient states her mom just passed away and her  is tomorrow afternoon.      Review of Systems:       The patient denied headaches, visual changes, LOC, SOB, CP, ABD pain, N/V/D, skin changes, new or worsening weakness or neuromuscular deficits. Comprehensive ROS negative except as mentioned above. Past Medical History:        Diagnosis Date    Anxiety and depression     Arthritis     Asthma     Chronic pain syndrome     Complication of joint prosthesis (HCC)     COPD (chronic obstructive pulmonary disease) (HCC)     Failed back surgical syndrome     Failed neck syndrome     Fibromyalgia     GERD (gastroesophageal reflux disease)     Influenza A 2019    MDRO (multiple drug resistant organisms) resistance 2018    sputum    Obesity (BMI 30-39. 9)     Pneumonia        Past Surgical History:        Procedure Laterality Date    ABDOMEN SURGERY      BACK SURGERY       SECTION      x2    ENDOSCOPY, COLON, DIAGNOSTIC      HERNIA REPAIR      JOINT REPLACEMENT      right knee    KNEE ARTHROCENTESIS      removal of bilateral knees    SPINE SURGERY      5  back surgeries     UPPER GASTROINTESTINAL ENDOSCOPY  2015       Allergies:  Gabapentin, Penicillins, Morphine, and Morphine and related    Past medical and surgical history reviewed. Any changes have been noted. PHYSICAL EXAM:  BP (!) 166/94   Pulse 73   Temp 98.6 °F (37 °C) (Oral)   Resp 15   Ht 5' (1.524 m)   Wt 172 lb 6.4 oz (78.2 kg)   SpO2 97%   BMI 33.67 kg/m²       Intake/Output Summary (Last 24 hours) at 2022 1459  Last data filed at 2022 5212  Gross per 24 hour   Intake 240 ml   Output 850 ml   Net -610 ml        General appearance:   No apparent distress, appears stated age. Cooperative. HEENT:  Normocephalic, atraumatic. PERRLA. EOMi. Conjunctivae/corneas clear, no icterus, non-injected. Neck: Supple, with full range of motion. No jugular venous distention. Trachea midline. Respiratory:  Normal respiratory effort. Clear to auscultation, bilaterally without Rales/Wheezes/Rhonchi. Cardiovascular:  Regular rate and rhythm without murmurs, rubs or gallops.   Abdomen: Soft, non-tender, non-distended, without rebound or guarding. Normal bowel sounds. Musculoskeletal:  No clubbing, cyanosis or edema bilaterally. Full range of motion without deformity. Skin: Skin color, texture, turgor normal.  No rashes or lesions. Neurologic:  Neurovascularly intact without any focal sensory/motor deficits. Cranial nerves: II-XII intact, grossly intact. No facial asymmetry, tongue midline. Psychiatric:  Alert and oriented, thought content appropriate  Capillary Refill: Brisk,< 3 seconds   Peripheral Pulses: +2 palpable, equal bilaterally       LABS:    Lab Results   Component Value Date    WBC 8.6 01/18/2022    HGB 12.9 01/18/2022    HCT 39.1 01/18/2022    MCV 86.0 01/18/2022     01/18/2022    LYMPHOPCT 24.7 01/18/2022    RBC 4.54 01/18/2022    MCH 28.3 01/18/2022    MCHC 32.9 01/18/2022    RDW 13.8 01/18/2022       Lab Results   Component Value Date    CREATININE 0.9 01/19/2022    BUN 22 (H) 01/19/2022     01/19/2022    K 4.3 01/19/2022     01/19/2022    CO2 27 01/19/2022       Lab Results   Component Value Date    MG 1.70 05/02/2018       Lab Results   Component Value Date    ALT 16 01/18/2022    AST 18 01/18/2022    ALKPHOS 95 01/18/2022    BILITOT <0.2 01/18/2022        No flowsheet data found. Lab Results   Component Value Date    LABA1C 5.4 03/24/2017       Imaging:  CT Head WO Contrast   Final Result   No acute intracranial abnormality. RECOMMENDATIONS:   Unavailable         XR CHEST PORTABLE   Final Result   Clear lungs.          MRI BRAIN W WO CONTRAST    (Results Pending)       Scheduled and prn Medications:    Scheduled Meds:   budesonide-formoterol  2 puff Inhalation BID    sodium chloride flush  5-40 mL IntraVENous 2 times per day    enoxaparin  40 mg SubCUTAneous Daily    ciprofloxacin  500 mg Oral 2 times per day    methadone  60 mg Oral QAM AC     Continuous Infusions:   sodium chloride       PRN Meds:.ipratropium-albuterol, sodium chloride flush, sodium chloride, ondansetron **OR** ondansetron, polyethylene glycol, acetaminophen **OR** acetaminophen, melatonin    Assessment & Plan:         Seiz[facility] activity ([facility] [R56.9] 01/18/2022      Seizure-like activity; she is going through a very stressful time with recently mother passed away  No prior history of seizures, no family history of seizures  We will get an EEG-- neg for seizure activity  Neurology evaluation  MRI without contrast has been ordered further evaluation-- neg for acute findings   - hold off on AEDs at this time   - monitor overnight  - likely d/c tomorrow in the morning     Abnormal UA concerning for UTI  Initially UA showed some epithelial cells, this was repeated she still has some mild leukocytosis  Start ciprofloxacin  Follow-up urine cultures     Opiate dependence  Continue home methadone dose    HTN  - does not take meds  - may be situational.   - pt states her BP is typically 110s-120s. - she states she thinks it is high because she is here  - can give IV hydralazine 10mg PRN for SBP > 190 or DBP >100      Continue current regimen/therapies. Monitor. Adjust medical regimen as appropriate. Body mass index is 33.67 kg/m². The patient and / or the family were informed of the results of any tests, a time was given to answer questions, a plan was proposed and they agreed with plan. DVT ppx: lovenox      Diet: ADULT DIET;  Regular    Consults:  IP CONSULT TO NEUROLOGY  IP CONSULT TO PHARMACY    DISPO/placement plan: likely home tomorrow    Code Status: Full Code      HA Pham - HANK  01/19/22

## 2022-01-19 NOTE — ED PROVIDER NOTES
EKG: Normal sinus rhythm, rate of 90, no acute ST-T wave changes. Rhythm strip shows a sinus rhythm with a rate of 90, AR interval 154 ms, QRS of 82 ms with no other ectopy as interpreted by me. No significant change compared to 6/6/2019.      Miley Iniguez MD  01/18/22 6044

## 2022-01-19 NOTE — PLAN OF CARE
Problem: Falls - Risk of:  Goal: Will remain free from falls  Description: Will remain free from falls  1/19/2022 0452 by Azucena Garrett RN  Outcome: Met This Shift  Note: Patient educated on fall prevention. Call light is within reach, bed locked in lowest position, personal items within reach, and bed alarm is on. Will round on patient per unit guidelines. 1/19/2022 0451 by Azucena Garrett RN  Outcome: Met This Shift  Note: Patient educated on fall prevention. Call light is within reach, bed locked in lowest position, personal items within reach, and bed alarm is on. Will round on patient per unit guidelines. Goal: Absence of physical injury  Description: Absence of physical injury  Outcome: Met This Shift  Note: Pt is free of injury. No injury noted. Fall precautions in place. Call light within reach. Will monitor. Problem: OXYGENATION/RESPIRATORY FUNCTION  Goal: Patient will maintain patent airway  Outcome: Met This Shift  Note: Patient remains free of significant airway secretions. Patient able to effectively cough and clear any secretions that need cleared. Will continue to monitor patient throughout shift. Problem: FLUID AND ELECTROLYTE IMBALANCE  Goal: Fluid and electrolyte balance are achieved/maintained  Outcome: Ongoing  Note: Early and frequent ambulqtion encouraged. Educated patient on importance of early ambulation. Patient assisted with ambulation. Will continue to monitor. Problem: ACTIVITY INTOLERANCE/IMPAIRED MOBILITY  Goal: Mobility/activity is maintained at optimum level for patient  Outcome: Ongoing  Note: Early and frequent ambulqtion encouraged. Educated patient on importance of early ambulation. Patient assisted with ambulation. Will continue to monitor.

## 2022-01-20 VITALS
SYSTOLIC BLOOD PRESSURE: 149 MMHG | RESPIRATION RATE: 18 BRPM | HEART RATE: 87 BPM | WEIGHT: 169.09 LBS | HEIGHT: 60 IN | TEMPERATURE: 98.2 F | OXYGEN SATURATION: 98 % | BODY MASS INDEX: 33.2 KG/M2 | DIASTOLIC BLOOD PRESSURE: 93 MMHG

## 2022-01-20 PROCEDURE — 2580000003 HC RX 258: Performed by: INTERNAL MEDICINE

## 2022-01-20 PROCEDURE — 96376 TX/PRO/DX INJ SAME DRUG ADON: CPT

## 2022-01-20 PROCEDURE — G0378 HOSPITAL OBSERVATION PER HR: HCPCS

## 2022-01-20 PROCEDURE — 6370000000 HC RX 637 (ALT 250 FOR IP): Performed by: INTERNAL MEDICINE

## 2022-01-20 PROCEDURE — 94640 AIRWAY INHALATION TREATMENT: CPT

## 2022-01-20 PROCEDURE — 94761 N-INVAS EAR/PLS OXIMETRY MLT: CPT

## 2022-01-20 PROCEDURE — 6360000002 HC RX W HCPCS: Performed by: NURSE PRACTITIONER

## 2022-01-20 RX ORDER — CIPROFLOXACIN 500 MG/1
500 TABLET, FILM COATED ORAL EVERY 12 HOURS SCHEDULED
Qty: 20 TABLET | Refills: 0 | Status: SHIPPED | OUTPATIENT
Start: 2022-01-20 | End: 2022-01-30

## 2022-01-20 RX ORDER — OXYMETAZOLINE HYDROCHLORIDE 0.05 G/100ML
2 SPRAY NASAL 2 TIMES DAILY PRN
Qty: 1 EACH | Refills: 3 | Status: SHIPPED | OUTPATIENT
Start: 2022-01-20

## 2022-01-20 RX ORDER — HYDROXYZINE PAMOATE 25 MG/1
25-50 CAPSULE ORAL 3 TIMES DAILY PRN
Qty: 10 CAPSULE | Refills: 0 | Status: SHIPPED | OUTPATIENT
Start: 2022-01-20 | End: 2022-02-03

## 2022-01-20 RX ORDER — LANOLIN ALCOHOL/MO/W.PET/CERES
12 CREAM (GRAM) TOPICAL NIGHTLY PRN
Qty: 120 TABLET | Refills: 1 | Status: SHIPPED | OUTPATIENT
Start: 2022-01-20 | End: 2022-02-19

## 2022-01-20 RX ADMIN — CIPROFLOXACIN 500 MG: 500 TABLET, FILM COATED ORAL at 08:36

## 2022-01-20 RX ADMIN — BUDESONIDE AND FORMOTEROL FUMARATE DIHYDRATE 2 PUFF: 160; 4.5 AEROSOL RESPIRATORY (INHALATION) at 09:27

## 2022-01-20 RX ADMIN — LORAZEPAM 1 MG: 2 INJECTION INTRAMUSCULAR; INTRAVENOUS at 10:13

## 2022-01-20 RX ADMIN — LORAZEPAM 1 MG: 2 INJECTION INTRAMUSCULAR; INTRAVENOUS at 04:01

## 2022-01-20 RX ADMIN — Medication 10 ML: at 08:38

## 2022-01-20 RX ADMIN — METHADONE HYDROCHLORIDE 60 MG: 10 TABLET ORAL at 08:36

## 2022-01-20 ASSESSMENT — PAIN DESCRIPTION - DESCRIPTORS: DESCRIPTORS: ACHING

## 2022-01-20 ASSESSMENT — PAIN - FUNCTIONAL ASSESSMENT: PAIN_FUNCTIONAL_ASSESSMENT: ACTIVITIES ARE NOT PREVENTED

## 2022-01-20 ASSESSMENT — PAIN DESCRIPTION - FREQUENCY: FREQUENCY: CONTINUOUS

## 2022-01-20 ASSESSMENT — PAIN DESCRIPTION - LOCATION: LOCATION: KNEE

## 2022-01-20 ASSESSMENT — PAIN DESCRIPTION - PROGRESSION: CLINICAL_PROGRESSION: GRADUALLY WORSENING

## 2022-01-20 ASSESSMENT — PAIN SCALES - GENERAL: PAINLEVEL_OUTOF10: 8

## 2022-01-20 ASSESSMENT — PAIN DESCRIPTION - ONSET: ONSET: ON-GOING

## 2022-01-20 ASSESSMENT — PAIN DESCRIPTION - PAIN TYPE: TYPE: ACUTE PAIN

## 2022-01-20 ASSESSMENT — PAIN DESCRIPTION - ORIENTATION: ORIENTATION: RIGHT;LEFT

## 2022-01-20 NOTE — PROGRESS NOTES
This RN perfect served Katrina Taylor NP to inform that patient refused AM Lovenox. Will continue to monitor patient per unit protocols.  Electronically signed by Oamr Murray RN on 1/20/2022 at 9:06 AM

## 2022-01-20 NOTE — CARE COORDINATION
Noted d/c order. Met with patient; she goes daily to [facility]one clinic. Daughter is able to help her with transportation. Per patient request provided food stamp application, [employer] transportation numbers and info on obtaining [employer] cell phone. Patient had been living with her Mom who just passed away. Patient will remain in the home they had.    Electronically signed by Milo Yanez on 1/20/2022 at 5:32 PM

## 2022-01-20 NOTE — DISCHARGE SUMMARY
Hospital Medicine Discharge Summary    Patient ID: Melanie Foss      Patient's PCP: Erica Desai Date: 1/18/2022     Discharge Date: 1/20/2022      Admitting Physician: Sagar Castorena MD     Discharge Physician: HA Wong CNP       Discharge Diagnoses: Active Hospital Problems    Diagnosis Date Noted    Seizure-like activity [facility]) [R56.9] 01/18/2022       The patient was seen and examined on day of discharge and this discharge summary is in conjunction with any daily progress note from day of discharge. Disposition:  [x] Home  [] Home with home health [] Rehab [] Psych [] SNF  [] LTAC  [] Long term nursing home or group home [] Transfer to ICU  [] Transfer to PCU [] Other:    Hospital Course: 46 y. o. female who has a past medical history of opiate dependence on methadone, anxiety and depression, chronic pain syndrome, prior orthopedic surgeries  Her mother passed away last Thursday due to pneumonia, father lives in [place] has come to town to help clean up the house  He was noted to be washing dishes when she suddenly became quiet when father asked she was unable to respond, eyes were closed, he tried to take her to the couch and noted that her hands were stiff, he made her lay on the couch hands and she remained unresponsive for 10 minutes.  She did not have any urinary bowel incontinence.  No biting of tongue.  EMS was called brought the patient to the emergency room.  Patient was back to her baseline when in the ED.  Work-up included a UA which had epithelial cells so it is being repeated.  She has no history of seizures, she has no family history of seizures. Leia Brunner denies any drug use and this is confirmed with a urine drug screen which is only positive methadone.  Patient was admitted to hospital for further monitoring and neurology evaluation     1/19: patient states she does not remember the events but states her father told her she had her arms out in front of her and was unresponsive. She came to at the hospital and felt very tired afterwards. She takes methadone at home, but denies alcohol or drug use otherwise. She has no prior hx of seizures. EEG was neg. Neurology wants to hold on AEDs at this time.  urine drug screen Neg except for her known methadone     Will keep her overnight to monitor x24 hours and plan for d/c tomorrow. Patient states her mom just passed away and her  is tomorrow afternoon     : patient doing ok. Feels anxious for her mom's  today. Refilled some meds for her and gave small Rx for vistaril. She will f/u with her PCP   She is stable and ready for discharge      Assessment:      Seizure-like activity ([facility] [address] [R56.9] 2022      Seizure-like activity; she is going through a very stressful time with recently mother passed away  No prior history of seizures, no family history of seizures  We will get an EEG-- neg for seizure activity  Neurology evaluation  MRI without contrast has been ordered further evaluation-- neg for acute findings   - hold off on AEDs at this time   - monitor overnight  - likely d/c tomorrow in the morning  Neuro recs:   Given the constellation of her history, examination findings and reassuring EEG and MRI, my overall suspicion she had a seizure is low. 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 I suspect she had a non-epileptic episode. With that said, I cannot definitively exclude a seizure, and from that standpoint she would benefit from continued outpatient follow-up to determine in the future if additional testing such as continuous EEG monitoring is merited.  Otherwise at this time I do not recommend any further neurologic work-up.     We discussed she should not drive for a minimum of 3 months given an episode of unexplained LOC, regardless whether or not this was a seizure or a non-epileptic event.     Plan:  - Follow-up in 3 months with Neurology  - No further inpatient neurologic work-up  - We will sign off but remain available for questions       Abnormal UA concerning for UTI  Initially UA showed some epithelial cells, this was repeated she still has some mild leukocytosis  Start ciprofloxacin  Follow-up urine cultures     Opiate dependence  Continue home methadone dose  - methadone clinic was notified on the day of discharge by our pharmacist of her discharge and that she received her dose today.     HTN  - does not take meds  - may be situational.   - pt states her BP is typically 110s-120s. - she states she thinks it is high because she is here  - can give IV hydralazine 10mg PRN for SBP > 190 or DBP >100  - will have her f/u with her PCP going forward. Not indicated for new med while she is here. Exam:     BP (!) 149/93   Pulse 87   Temp 98.2 °F (36.8 °C) (Oral)   Resp 18   Ht 5' (1.524 m)   Wt 169 lb 1.5 oz (76.7 kg)   SpO2 98%   BMI 33.02 kg/m²   General appearance: No apparent distress, appears stated age and cooperative. HEENT: Pupils equal, round, and reactive to light. Conjunctivae/corneas clear. Neck: Supple, with full range of motion. No jugular venous distention. Trachea midline. Respiratory:  Normal respiratory effort. Clear to auscultation, bilaterally without Rales/Wheezes/Rhonchi. Cardiovascular: Regular rate and rhythm with normal S1/S2 without murmurs, rubs or gallops. Abdomen: Soft, non-tender, non-distended with normal bowel sounds. Musculoskelatal: No clubbing, cyanosis or edema bilaterally. Full range of motion without deformity. Skin: Skin color, texture, turgor normal.  No rashes or lesions. Neurologic:  Neurovascularly intact without any focal sensory/motor deficits.  Cranial nerves: II-XII intact, grossly non-focal.  Psychiatric: Alert and oriented, thought content appropriate, normal insight      Consults:     IP CONSULT TO NEUROLOGY  IP CONSULT TO PHARMACY    Diagnostic tests:   Imaging:  MRI BRAIN W WO CONTRAST Final Result   No acute intracranial abnormality visualized.       Slightly low-lying cerebellar tonsils and minimal chronic white matter   microvascular ischemic changes.       RECOMMENDATIONS:   Unavailable           CT Head WO Contrast   Final Result   No acute intracranial abnormality.       RECOMMENDATIONS:   Unavailable           XR CHEST PORTABLE   Final Result   Clear lungs.           EEG:   EEG Interpretation: This EEG was within normal limits for a patient of this age in   the awake and drowsy state. There were no epileptiform   abnormalities or seizures. Significant artifact was present   throughout recording limiting full interpretation of temporal   chains. If clinical concern for seizure persists would consider   repeating an outpatient EEG. Note: a normal routine EEG does not rule out a diagnosis of   epilepsy. Labs: For convenience and continuity at follow-up the following most recent labs are provided:      CBC:    Lab Results   Component Value Date    WBC 8.6 01/18/2022    HGB 12.9 01/18/2022    HCT 39.1 01/18/2022     01/18/2022       Renal:    Lab Results   Component Value Date     01/19/2022    K 4.3 01/19/2022     01/19/2022    CO2 27 01/19/2022    BUN 22 01/19/2022    CREATININE 0.9 01/19/2022    CALCIUM 8.2 01/19/2022    PHOS 4.0 02/24/2019           Discharge Instructions/Follow-up:  Complete antibiotics for UTI. F/u with PCP on discharge within 1 wk. Follow up with Neurology 3 months. No driving x3 months until cleared by neurology     PCP/SNF to follow up: pcp 1 wk.      D/C condition: stable    Code status: full      Discharge Medications:     Discharge Medication List as of 1/20/2022 11:26 AM           Details   ciprofloxacin (CIPRO) 500 MG tablet Take 1 tablet by mouth every 12 hours for 10 days, Disp-20 tablet, R-0Normal      melatonin 3 MG TABS tablet Take 4 tablets by mouth nightly as needed (insomnia), Disp-120 tablet, R-1Normal      hydrOXYzine (VISTARIL) 25 MG capsule Take 1-2 capsules by mouth 3 times daily as needed for Itching or Anxiety, Disp-10 capsule, R-0Normal              Details   oxymetazoline (AFRIN) 0.05 % nasal spray 2 sprays by Nasal route 2 times daily as needed for Congestion, Disp-1 each, R-3Normal              Details   albuterol sulfate  (90 Base) MCG/ACT inhaler Inhale 2 puffs into the lungs every 4 hours as needed for Wheezing or Shortness of Breath, Disp-1 Inhaler, R-3Normal      ipratropium-albuterol (DUONEB) 0.5-2.5 (3) MG/3ML SOLN nebulizer solution Take 3 mLs by nebulization every 4 hours as needed for Shortness of Breath, Disp-360 mL, R-3Normal      ibuprofen (ADVIL;MOTRIN) 600 MG tablet Take 600 mg by mouth every 8 hours as needed Historical Med      budesonide-formoterol (SYMBICORT) 160-4.5 MCG/ACT AERO INHALE TWO PUFFS BY MOUTH TWICE A DAY -- MUST BE SEEN for future refills, Disp-1 Inhaler, R-1Normal      methadone (DOLOPHINE) 10 MG tablet Take 60 mg by mouth daily. Between 070-0800Historical Med      fluticasone (FLONASE) 50 MCG/ACT nasal spray 1 spray by Nasal route nightly Historical Med             Time Spent on discharge is more than 30 minutes in the examination, evaluation, counseling and review of medications and discharge plan. Signed:    HA Cooper - CNP   1/20/2022      Thank you Abel Rose for the opportunity to be involved in this patient's care. If you have any questions or concerns please feel free to contact me at [phone].

## 2022-01-20 NOTE — PROGRESS NOTES
PT AAO x4 per this shift. VSS. Pt able to ambulate on own. Pt erasmo pains Pt having high anxiety per this shift. Calming techniques and self care discussed with PT. Pt verbalized understanding. Pt tolerating PO fluids. No further needs voiced at this time. Fall precautions in place. Bed alarm on. Call light within reach. Will continue to round.  Electronically signed by Christiane Toro RN on 1/20/2022 at 3:34 AM

## 2022-01-20 NOTE — PROGRESS NOTES
Hospital Medicine Progress Note      Admit Date: 2022       CC: F/U for seizure like activity     HPI: 46 y. o. female who has a past medical history of opiate dependence on methadone, anxiety and depression, chronic pain syndrome, prior orthopedic surgeries  Her mother passed away last Thursday due to pneumonia, father lives in [place] has come to town to help clean up the house  He was noted to be washing dishes when she suddenly became quiet when father asked she was unable to respond, eyes were closed, he tried to take her to the couch and noted that her hands were stiff, he made her lay on the couch hands and she remained unresponsive for 10 minutes.  She did not have any urinary bowel incontinence.  No biting of tongue.  EMS was called brought the patient to the emergency room.  Patient was back to her baseline when in the ED.  Work-up included a UA which had epithelial cells so it is being repeated.  She has no history of seizures, she has no family history of seizures. Cuong Oakley denies any drug use and this is confirmed with a urine drug screen which is only positive methadone.  Patient was admitted to hospital for further monitoring and neurology evaluation     : patient states she does not remember the events but states her father told her she had her arms out in front of her and was unresponsive. She came to at the hospital and felt very tired afterwards. She takes methadone at home, but denies alcohol or drug use otherwise. She has no prior hx of seizures. EEG was neg. Neurology wants to hold on AEDs at this time. urine drug screen Neg except for her known methadone     Will keep her overnight to monitor x24 hours and plan for d/c tomorrow. Patient states her mom just passed away and her  is tomorrow afternoon    Interval History/Subjective: patient doing ok. Feels anxious for her mom's  today. Refilled some meds for her and gave small Rx for vistaril.  She will f/u with her PCP    Review of Systems:       The patient denied headaches, visual changes, LOC, SOB, CP, ABD pain, N/V/D, skin changes, new or worsening weakness or neuromuscular deficits. Comprehensive ROS negative except as mentioned above. Past Medical History:        Diagnosis Date    Anxiety and depression     Arthritis     Asthma     Chronic pain syndrome     Complication of joint prosthesis (HCC)     COPD (chronic obstructive pulmonary disease) (HCC)     Failed back surgical syndrome     Failed neck syndrome     Fibromyalgia     GERD (gastroesophageal reflux disease)     Influenza A 2019    MDRO (multiple drug resistant organisms) resistance 2018    sputum    Obesity (BMI 30-39. 9)     Pneumonia        Past Surgical History:        Procedure Laterality Date    ABDOMEN SURGERY      BACK SURGERY       SECTION      x2    ENDOSCOPY, COLON, DIAGNOSTIC      HERNIA REPAIR      JOINT REPLACEMENT      right knee    KNEE ARTHROCENTESIS      removal of bilateral knees    SPINE SURGERY      5  back surgeries     UPPER GASTROINTESTINAL ENDOSCOPY  2015       Allergies:  Gabapentin, Penicillins, Morphine, and Morphine and related    Past medical and surgical history reviewed. Any changes have been noted. PHYSICAL EXAM:  BP (!) 174/88   Pulse 79   Temp 97.7 °F (36.5 °C) (Oral)   Resp 20   Ht 5' (1.524 m)   Wt 169 lb 1.5 oz (76.7 kg)   SpO2 99%   BMI 33.02 kg/m²       Intake/Output Summary (Last 24 hours) at 2022 5518  Last data filed at 2022 8248  Gross per 24 hour   Intake 1200 ml   Output 2700 ml   Net -1500 ml        General appearance:   No apparent distress, appears stated age. Cooperative. HEENT:  Normocephalic, atraumatic. PERRLA. EOMi. Conjunctivae/corneas clear, no icterus, non-injected. Neck: Supple, with full range of motion. No jugular venous distention. Trachea midline. Respiratory:  Normal respiratory effort.  Clear to auscultation, bilaterally without Rales/Wheezes/Rhonchi. Cardiovascular:  Regular rate and rhythm without murmurs, rubs or gallops. Abdomen: Soft, non-tender, non-distended, without rebound or guarding. Normal bowel sounds. Musculoskeletal:  No clubbing, cyanosis or edema bilaterally. Full range of motion without deformity. Skin: Skin color, texture, turgor normal.  No rashes or lesions. Neurologic:  Neurovascularly intact without any focal sensory/motor deficits. Cranial nerves: II-XII intact, grossly intact. No facial asymmetry, tongue midline. Psychiatric:  Alert and oriented, thought content appropriate  Capillary Refill: Brisk,< 3 seconds   Peripheral Pulses: +2 palpable, equal bilaterally       LABS:    Lab Results   Component Value Date    WBC 8.6 01/18/2022    HGB 12.9 01/18/2022    HCT 39.1 01/18/2022    MCV 86.0 01/18/2022     01/18/2022    LYMPHOPCT 24.7 01/18/2022    RBC 4.54 01/18/2022    MCH 28.3 01/18/2022    MCHC 32.9 01/18/2022    RDW 13.8 01/18/2022       Lab Results   Component Value Date    CREATININE 0.9 01/19/2022    BUN 22 (H) 01/19/2022     01/19/2022    K 4.3 01/19/2022     01/19/2022    CO2 27 01/19/2022       Lab Results   Component Value Date    MG 1.70 05/02/2018       Lab Results   Component Value Date    ALT 16 01/18/2022    AST 18 01/18/2022    ALKPHOS 95 01/18/2022    BILITOT <0.2 01/18/2022        No flowsheet data found. Lab Results   Component Value Date    LABA1C 5.4 03/24/2017       Imaging:  MRI BRAIN W WO CONTRAST   Final Result   No acute intracranial abnormality visualized. Slightly low-lying cerebellar tonsils and minimal chronic white matter   microvascular ischemic changes. RECOMMENDATIONS:   Unavailable         CT Head WO Contrast   Final Result   No acute intracranial abnormality. RECOMMENDATIONS:   Unavailable         XR CHEST PORTABLE   Final Result   Clear lungs.              Scheduled and prn Medications:    Scheduled Meds:   budesonide-formoterol 2 puff Inhalation BID    sodium chloride flush  5-40 mL IntraVENous 2 times per day    enoxaparin  40 mg SubCUTAneous Daily    ciprofloxacin  500 mg Oral 2 times per day    methadone  60 mg Oral QAM AC     Continuous Infusions:   sodium chloride       PRN Meds:.sodium chloride flush, sodium chloride, ondansetron **OR** ondansetron, polyethylene glycol, acetaminophen **OR** acetaminophen, melatonin, hydrALAZINE, LORazepam, albuterol sulfate HFA, ipratropium    Assessment & Plan:          Assessment:     Given the constellation of her history, examination findings and reassuring EEG and MRI, my overall suspicion she had a seizure is low. 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 I suspect she had a non-epileptic episode. With that said, I cannot definitively exclude a seizure, and from that standpoint she would benefit from continued outpatient follow-up to determine in the future if additional testing such as continuous EEG monitoring is merited. Otherwise at this time I do not recommend any further neurologic work-up.     We discussed she should not drive for a minimum of 3 months given an episode of unexplained LOC, regardless whether or not this was a seizure or a non-epileptic event.     Plan:  - Follow-up in 3 months with me  - No further inpatient neurologic work-up  - We will sign off but remain available for questions    Continue current regimen/therapies. Monitor. Adjust medical regimen as appropriate. Body mass index is 33.02 kg/m². The patient and / or the family were informed of the results of any tests, a time was given to answer questions, a plan was proposed and they agreed with plan. DVT ppx: lovenox      Diet: ADULT DIET;  Regular    Consults:  IP CONSULT TO NEUROLOGY  IP CONSULT TO PHARMACY    DISPO/placement plan: today d/c    Code Status: Full Code      HA Tavares - HANK  01/20/22

## 2022-01-20 NOTE — DISCHARGE INSTR - DIET

## 2022-01-20 NOTE — PLAN OF CARE
Problem: Falls - Risk of:  Goal: Will remain free from falls  Description: Will remain free from falls  Outcome: Met This Shift  Note: Patient educated on fall prevention. Call light is within reach, bed locked in lowest position, personal items within reach, and bed alarm is on. Will round on patient per unit guidelines. Goal: Absence of physical injury  Description: Absence of physical injury  Outcome: Met This Shift  Note: Pt is free of injury. No injury noted. Fall precautions in place. Call light within reach. Will monitor. Problem: OXYGENATION/RESPIRATORY FUNCTION  Goal: Patient will maintain patent airway  Outcome: Ongoing  Note: Patient remains free of significant airway secretions. Patient able to effectively cough and clear any secretions that need cleared. Will continue to monitor patient throughout shift. Problem: FLUID AND ELECTROLYTE IMBALANCE  Goal: Fluid and electrolyte balance are achieved/maintained  Outcome: Ongoing  Note: Patient monitored for fluid and electrolyte imbalance throughout the day by use of daily labs. Patient encouraged to drink fluids and eat a well balanced . Abnormal lab results will be reported to physician, per orders. Will continue to monitor.

## 2022-01-20 NOTE — PROGRESS NOTES
Pt A&Ox4. Pt in bed this AM, pt appears anxious, but is cooperative. Pt tolerating intake and PO medications whole with thin liquids. Complaints of knee pain, see MAR for intervention. Pt aware of discharge today. Able to make needs known. Call light within reach. Fall precautions in place. Will monitor per unit protocol.      Electronically signed by Tran Bernal RN on 1/20/2022 at 10:32 AM

## 2022-01-20 NOTE — PROGRESS NOTES
Data- discharge order received, pt verbalized agreement to discharge, disposition to previous residence, no needs for HHC/DME. Action- discharge instructions prepared and given to pt, pt verbalized understanding. Medication information packet given r/t NEW and/or CHANGED prescriptions emphasizing name/purpose/side effects, pt verbalized understanding. Discharge instruction summary: Diet- Regular, Activity- UAL, Primary Care Physician as follows: Broderickdon Kauffman [phone] f/u appointment, immunizations reviewed, prescription medications filled at pts preferred pharmacy. Response- Pt belongings gathered, IV removed. Disposition is home (no HHC/DME needs), transported with father in private car, taken to lobby via w/c w/ belongings and d/c paperwork, no complications. No further questions from pt.      Electronically signed by Aime Orta RN on 1/20/2022 at 11:43 AM

## 2022-01-20 NOTE — PLAN OF CARE
Problem: Falls - Risk of:  Goal: Will remain free from falls  Description: Will remain free from falls  1/20/2022 0807 by Mane Gonzalez RN  Outcome: Ongoing   Fall precautions in place. Gripper socks on. Call light within reach. Problem: Falls - Risk of:  Goal: Absence of physical injury  Description: Absence of physical injury  1/20/2022 0807 by Mane Gonzalez RN  Outcome: Ongoing  Problem: Pain:  Goal: Control of acute pain  Description: Control of acute pain  1/20/2022 0807 by Mane Gonzalez RN  Outcome: Ongoing   Will monitor pain level and administer medication per [facility]

## 2023-09-25 ENCOUNTER — HOSPITAL ENCOUNTER (INPATIENT)
Age: 53
LOS: 4 days | Discharge: HOME | End: 2023-09-29
Attending: EMERGENCY MEDICINE | Admitting: INTERNAL MEDICINE
Payer: MEDICARE

## 2023-09-25 ENCOUNTER — APPOINTMENT (OUTPATIENT)
Dept: GENERAL RADIOLOGY | Age: 53
End: 2023-09-25
Payer: MEDICARE

## 2023-09-25 ENCOUNTER — ANESTHESIA EVENT (OUTPATIENT)
Dept: OPERATING ROOM | Age: 53
End: 2023-09-25
Payer: MEDICARE

## 2023-09-25 DIAGNOSIS — S72.001A CLOSED FRACTURE OF NECK OF RIGHT FEMUR, INITIAL ENCOUNTER (HCC): Primary | ICD-10-CM

## 2023-09-25 PROBLEM — S72.002A HIP FRACTURE REQUIRING OPERATIVE REPAIR, LEFT, CLOSED, INITIAL ENCOUNTER (HCC): Status: ACTIVE | Noted: 2023-09-25

## 2023-09-25 LAB
ABO + RH BLD: NORMAL
ANION GAP SERPL CALCULATED.3IONS-SCNC: 11 MMOL/L (ref 3–16)
APTT BLD: 26.3 SEC (ref 22.7–35.9)
BASOPHILS # BLD: 0.1 K/UL (ref 0–0.2)
BASOPHILS NFR BLD: 1.1 %
BLD GP AB SCN SERPL QL: NORMAL
BUN SERPL-MCNC: 35 MG/DL (ref 7–20)
CALCIUM SERPL-MCNC: 8.3 MG/DL (ref 8.3–10.6)
CHLORIDE SERPL-SCNC: 103 MMOL/L (ref 99–110)
CO2 SERPL-SCNC: 21 MMOL/L (ref 21–32)
CREAT SERPL-MCNC: 0.7 MG/DL (ref 0.6–1.1)
DEPRECATED RDW RBC AUTO: 13.2 % (ref 12.4–15.4)
EKG ATRIAL RATE: 63 BPM
EKG DIAGNOSIS: NORMAL
EKG P AXIS: 56 DEGREES
EKG P-R INTERVAL: 174 MS
EKG Q-T INTERVAL: 440 MS
EKG QRS DURATION: 98 MS
EKG QTC CALCULATION (BAZETT): 450 MS
EKG R AXIS: 16 DEGREES
EKG T AXIS: 32 DEGREES
EKG VENTRICULAR RATE: 63 BPM
EOSINOPHIL # BLD: 0.4 K/UL (ref 0–0.6)
EOSINOPHIL NFR BLD: 5.9 %
GFR SERPLBLD CREATININE-BSD FMLA CKD-EPI: >60 ML/MIN/{1.73_M2}
GLUCOSE SERPL-MCNC: 98 MG/DL (ref 70–99)
HCT VFR BLD AUTO: 37.4 % (ref 36–48)
HGB BLD-MCNC: 12.9 G/DL (ref 12–16)
INR PPP: 1.04 (ref 0.84–1.16)
LYMPHOCYTES # BLD: 1.5 K/UL (ref 1–5.1)
LYMPHOCYTES NFR BLD: 20.8 %
MCH RBC QN AUTO: 28.2 PG (ref 26–34)
MCHC RBC AUTO-ENTMCNC: 34.4 G/DL (ref 31–36)
MCV RBC AUTO: 82 FL (ref 80–100)
MONOCYTES # BLD: 0.7 K/UL (ref 0–1.3)
MONOCYTES NFR BLD: 9.3 %
NEUTROPHILS # BLD: 4.7 K/UL (ref 1.7–7.7)
NEUTROPHILS NFR BLD: 62.9 %
PLATELET # BLD AUTO: 233 K/UL (ref 135–450)
PMV BLD AUTO: 6.9 FL (ref 5–10.5)
POTASSIUM SERPL-SCNC: 3.4 MMOL/L (ref 3.5–5.1)
PROTHROMBIN TIME: 13.6 SEC (ref 11.5–14.8)
RBC # BLD AUTO: 4.56 M/UL (ref 4–5.2)
SARS-COV-2 RDRP RESP QL NAA+PROBE: NOT DETECTED
SODIUM SERPL-SCNC: 135 MMOL/L (ref 136–145)
WBC # BLD AUTO: 7.4 K/UL (ref 4–11)

## 2023-09-25 PROCEDURE — 1200000000 HC SEMI PRIVATE

## 2023-09-25 PROCEDURE — 86900 BLOOD TYPING SEROLOGIC ABO: CPT

## 2023-09-25 PROCEDURE — 80048 BASIC METABOLIC PNL TOTAL CA: CPT

## 2023-09-25 PROCEDURE — 73502 X-RAY EXAM HIP UNI 2-3 VIEWS: CPT

## 2023-09-25 PROCEDURE — 85730 THROMBOPLASTIN TIME PARTIAL: CPT

## 2023-09-25 PROCEDURE — 93005 ELECTROCARDIOGRAM TRACING: CPT | Performed by: PHYSICIAN ASSISTANT

## 2023-09-25 PROCEDURE — 85025 COMPLETE CBC W/AUTO DIFF WBC: CPT

## 2023-09-25 PROCEDURE — 6360000002 HC RX W HCPCS: Performed by: PHYSICIAN ASSISTANT

## 2023-09-25 PROCEDURE — 71045 X-RAY EXAM CHEST 1 VIEW: CPT

## 2023-09-25 PROCEDURE — 94640 AIRWAY INHALATION TREATMENT: CPT

## 2023-09-25 PROCEDURE — 86850 RBC ANTIBODY SCREEN: CPT

## 2023-09-25 PROCEDURE — 6370000000 HC RX 637 (ALT 250 FOR IP): Performed by: PHYSICIAN ASSISTANT

## 2023-09-25 PROCEDURE — 85610 PROTHROMBIN TIME: CPT

## 2023-09-25 PROCEDURE — 6370000000 HC RX 637 (ALT 250 FOR IP): Performed by: INTERNAL MEDICINE

## 2023-09-25 PROCEDURE — 94760 N-INVAS EAR/PLS OXIMETRY 1: CPT

## 2023-09-25 PROCEDURE — 6370000000 HC RX 637 (ALT 250 FOR IP): Performed by: NURSE PRACTITIONER

## 2023-09-25 PROCEDURE — 51702 INSERT TEMP BLADDER CATH: CPT

## 2023-09-25 PROCEDURE — 96374 THER/PROPH/DIAG INJ IV PUSH: CPT

## 2023-09-25 PROCEDURE — 6360000002 HC RX W HCPCS: Performed by: INTERNAL MEDICINE

## 2023-09-25 PROCEDURE — 87635 SARS-COV-2 COVID-19 AMP PRB: CPT

## 2023-09-25 PROCEDURE — 96375 TX/PRO/DX INJ NEW DRUG ADDON: CPT

## 2023-09-25 PROCEDURE — 2580000003 HC RX 258: Performed by: INTERNAL MEDICINE

## 2023-09-25 PROCEDURE — 99285 EMERGENCY DEPT VISIT HI MDM: CPT

## 2023-09-25 PROCEDURE — 86901 BLOOD TYPING SEROLOGIC RH(D): CPT

## 2023-09-25 PROCEDURE — 93010 ELECTROCARDIOGRAM REPORT: CPT | Performed by: INTERNAL MEDICINE

## 2023-09-25 RX ORDER — SODIUM CHLORIDE 9 MG/ML
INJECTION, SOLUTION INTRAVENOUS PRN
Status: DISCONTINUED | OUTPATIENT
Start: 2023-09-25 | End: 2023-09-29 | Stop reason: HOSPADM

## 2023-09-25 RX ORDER — ONDANSETRON 4 MG/1
4 TABLET, ORALLY DISINTEGRATING ORAL EVERY 8 HOURS PRN
Status: DISCONTINUED | OUTPATIENT
Start: 2023-09-25 | End: 2023-09-29 | Stop reason: HOSPADM

## 2023-09-25 RX ORDER — ALBUTEROL SULFATE 90 UG/1
2 AEROSOL, METERED RESPIRATORY (INHALATION) EVERY 4 HOURS PRN
Status: DISCONTINUED | OUTPATIENT
Start: 2023-09-25 | End: 2023-09-29 | Stop reason: HOSPADM

## 2023-09-25 RX ORDER — ENOXAPARIN SODIUM 100 MG/ML
40 INJECTION SUBCUTANEOUS DAILY
Status: DISCONTINUED | OUTPATIENT
Start: 2023-09-25 | End: 2023-09-29 | Stop reason: HOSPADM

## 2023-09-25 RX ORDER — ALBUTEROL SULFATE 2.5 MG/3ML
2.5 SOLUTION RESPIRATORY (INHALATION) EVERY 6 HOURS PRN
Status: DISCONTINUED | OUTPATIENT
Start: 2023-09-25 | End: 2023-09-25

## 2023-09-25 RX ORDER — PSEUDOEPHEDRINE HCL 30 MG
30 TABLET ORAL EVERY 4 HOURS PRN
Status: ON HOLD | COMMUNITY
End: 2023-09-29 | Stop reason: HOSPADM

## 2023-09-25 RX ORDER — ONDANSETRON 2 MG/ML
4 INJECTION INTRAMUSCULAR; INTRAVENOUS ONCE
Status: COMPLETED | OUTPATIENT
Start: 2023-09-25 | End: 2023-09-25

## 2023-09-25 RX ORDER — POLYETHYLENE GLYCOL 3350 17 G/17G
17 POWDER, FOR SOLUTION ORAL DAILY PRN
Status: DISCONTINUED | OUTPATIENT
Start: 2023-09-25 | End: 2023-09-29 | Stop reason: HOSPADM

## 2023-09-25 RX ORDER — GUAIFENESIN/DEXTROMETHORPHAN 100-10MG/5
5 SYRUP ORAL EVERY 4 HOURS PRN
Status: DISCONTINUED | OUTPATIENT
Start: 2023-09-25 | End: 2023-09-29 | Stop reason: HOSPADM

## 2023-09-25 RX ORDER — ACETAMINOPHEN 650 MG/1
650 SUPPOSITORY RECTAL EVERY 6 HOURS PRN
Status: DISCONTINUED | OUTPATIENT
Start: 2023-09-25 | End: 2023-09-29 | Stop reason: HOSPADM

## 2023-09-25 RX ORDER — OXYCODONE HYDROCHLORIDE AND ACETAMINOPHEN 5; 325 MG/1; MG/1
1 TABLET ORAL ONCE
Status: COMPLETED | OUTPATIENT
Start: 2023-09-25 | End: 2023-09-25

## 2023-09-25 RX ORDER — BENZONATATE 100 MG/1
100 CAPSULE ORAL 3 TIMES DAILY PRN
Status: DISCONTINUED | OUTPATIENT
Start: 2023-09-25 | End: 2023-09-29 | Stop reason: HOSPADM

## 2023-09-25 RX ORDER — IPRATROPIUM BROMIDE AND ALBUTEROL SULFATE 2.5; .5 MG/3ML; MG/3ML
1 SOLUTION RESPIRATORY (INHALATION) EVERY 4 HOURS PRN
Status: DISCONTINUED | OUTPATIENT
Start: 2023-09-25 | End: 2023-09-29 | Stop reason: HOSPADM

## 2023-09-25 RX ORDER — ONDANSETRON 2 MG/ML
4 INJECTION INTRAMUSCULAR; INTRAVENOUS EVERY 6 HOURS PRN
Status: DISCONTINUED | OUTPATIENT
Start: 2023-09-25 | End: 2023-09-29 | Stop reason: HOSPADM

## 2023-09-25 RX ORDER — PROMETHAZINE HYDROCHLORIDE 25 MG/1
25 TABLET ORAL EVERY 6 HOURS PRN
COMMUNITY

## 2023-09-25 RX ORDER — OXYCODONE HYDROCHLORIDE 5 MG/1
5 TABLET ORAL EVERY 4 HOURS PRN
Status: DISCONTINUED | OUTPATIENT
Start: 2023-09-25 | End: 2023-09-27

## 2023-09-25 RX ORDER — ACETAMINOPHEN 325 MG/1
650 TABLET ORAL EVERY 6 HOURS PRN
Status: DISCONTINUED | OUTPATIENT
Start: 2023-09-25 | End: 2023-09-29 | Stop reason: HOSPADM

## 2023-09-25 RX ORDER — SODIUM CHLORIDE 0.9 % (FLUSH) 0.9 %
5-40 SYRINGE (ML) INJECTION EVERY 12 HOURS SCHEDULED
Status: DISCONTINUED | OUTPATIENT
Start: 2023-09-25 | End: 2023-09-29 | Stop reason: HOSPADM

## 2023-09-25 RX ORDER — METHADONE HYDROCHLORIDE 10 MG/1
60 TABLET ORAL DAILY
Status: DISCONTINUED | OUTPATIENT
Start: 2023-09-25 | End: 2023-09-25

## 2023-09-25 RX ORDER — SODIUM CHLORIDE 0.9 % (FLUSH) 0.9 %
5-40 SYRINGE (ML) INJECTION PRN
Status: DISCONTINUED | OUTPATIENT
Start: 2023-09-25 | End: 2023-09-29 | Stop reason: HOSPADM

## 2023-09-25 RX ORDER — FLUTICASONE PROPIONATE 50 MCG
1 SPRAY, SUSPENSION (ML) NASAL NIGHTLY
Status: DISCONTINUED | OUTPATIENT
Start: 2023-09-25 | End: 2023-09-29 | Stop reason: HOSPADM

## 2023-09-25 RX ADMIN — ENOXAPARIN SODIUM 40 MG: 100 INJECTION SUBCUTANEOUS at 16:25

## 2023-09-25 RX ADMIN — HYDROMORPHONE HYDROCHLORIDE 0.5 MG: 1 INJECTION, SOLUTION INTRAMUSCULAR; INTRAVENOUS; SUBCUTANEOUS at 18:25

## 2023-09-25 RX ADMIN — BENZONATATE 100 MG: 100 CAPSULE ORAL at 16:25

## 2023-09-25 RX ADMIN — OXYCODONE HYDROCHLORIDE 5 MG: 5 TABLET ORAL at 20:38

## 2023-09-25 RX ADMIN — FLUTICASONE PROPIONATE 1 SPRAY: 50 SPRAY, METERED NASAL at 20:38

## 2023-09-25 RX ADMIN — ALBUTEROL SULFATE 2 PUFF: 90 AEROSOL, METERED RESPIRATORY (INHALATION) at 21:30

## 2023-09-25 RX ADMIN — OXYCODONE HYDROCHLORIDE AND ACETAMINOPHEN 1 TABLET: 5; 325 TABLET ORAL at 10:32

## 2023-09-25 RX ADMIN — GUAIFENESIN SYRUP AND DEXTROMETHORPHAN 5 ML: 100; 10 SYRUP ORAL at 16:25

## 2023-09-25 RX ADMIN — HYDROMORPHONE HYDROCHLORIDE 1 MG: 1 INJECTION, SOLUTION INTRAMUSCULAR; INTRAVENOUS; SUBCUTANEOUS at 12:12

## 2023-09-25 RX ADMIN — ONDANSETRON 4 MG: 2 INJECTION INTRAMUSCULAR; INTRAVENOUS at 12:13

## 2023-09-25 RX ADMIN — HYDROMORPHONE HYDROCHLORIDE 0.5 MG: 1 INJECTION, SOLUTION INTRAMUSCULAR; INTRAVENOUS; SUBCUTANEOUS at 22:51

## 2023-09-25 RX ADMIN — SODIUM CHLORIDE, PRESERVATIVE FREE 10 ML: 5 INJECTION INTRAVENOUS at 20:47

## 2023-09-25 RX ADMIN — OXYCODONE HYDROCHLORIDE 5 MG: 5 TABLET ORAL at 16:25

## 2023-09-25 RX ADMIN — GUAIFENESIN SYRUP AND DEXTROMETHORPHAN 5 ML: 100; 10 SYRUP ORAL at 20:37

## 2023-09-25 ASSESSMENT — PAIN DESCRIPTION - DESCRIPTORS
DESCRIPTORS: CRAMPING;SHOOTING
DESCRIPTORS: SHARP
DESCRIPTORS: THROBBING
DESCRIPTORS: ACHING
DESCRIPTORS: SHARP
DESCRIPTORS: THROBBING
DESCRIPTORS: ACHING
DESCRIPTORS: SHOOTING

## 2023-09-25 ASSESSMENT — PAIN SCALES - GENERAL
PAINLEVEL_OUTOF10: 10
PAINLEVEL_OUTOF10: 10
PAINLEVEL_OUTOF10: 8
PAINLEVEL_OUTOF10: 8
PAINLEVEL_OUTOF10: 4
PAINLEVEL_OUTOF10: 8
PAINLEVEL_OUTOF10: 9
PAINLEVEL_OUTOF10: 8
PAINLEVEL_OUTOF10: 10

## 2023-09-25 ASSESSMENT — PAIN DESCRIPTION - FREQUENCY
FREQUENCY: CONTINUOUS

## 2023-09-25 ASSESSMENT — PAIN DESCRIPTION - ONSET
ONSET: ON-GOING

## 2023-09-25 ASSESSMENT — PAIN DESCRIPTION - ORIENTATION
ORIENTATION: RIGHT

## 2023-09-25 ASSESSMENT — PAIN DESCRIPTION - PAIN TYPE
TYPE: ACUTE PAIN
TYPE: CHRONIC PAIN
TYPE: ACUTE PAIN

## 2023-09-25 ASSESSMENT — PAIN - FUNCTIONAL ASSESSMENT
PAIN_FUNCTIONAL_ASSESSMENT: 0-10
PAIN_FUNCTIONAL_ASSESSMENT: PREVENTS OR INTERFERES SOME ACTIVE ACTIVITIES AND ADLS
PAIN_FUNCTIONAL_ASSESSMENT: PREVENTS OR INTERFERES WITH ALL ACTIVE AND SOME PASSIVE ACTIVITIES
PAIN_FUNCTIONAL_ASSESSMENT: PREVENTS OR INTERFERES WITH MANY ACTIVE NOT PASSIVE ACTIVITIES

## 2023-09-25 ASSESSMENT — PAIN DESCRIPTION - LOCATION
LOCATION: HIP
LOCATION: HIP;PELVIS
LOCATION: HIP

## 2023-09-25 ASSESSMENT — LIFESTYLE VARIABLES
HOW MANY STANDARD DRINKS CONTAINING ALCOHOL DO YOU HAVE ON A TYPICAL DAY: PATIENT DOES NOT DRINK
HOW OFTEN DO YOU HAVE A DRINK CONTAINING ALCOHOL: NEVER

## 2023-09-25 ASSESSMENT — ENCOUNTER SYMPTOMS
VOMITING: 0
COUGH: 0
ABDOMINAL PAIN: 0
DIARRHEA: 0
SHORTNESS OF BREATH: 0
NAUSEA: 0
WHEEZING: 0
RHINORRHEA: 0

## 2023-09-25 ASSESSMENT — PAIN SCALES - WONG BAKER
WONGBAKER_NUMERICALRESPONSE: 0
WONGBAKER_NUMERICALRESPONSE: 2

## 2023-09-25 NOTE — H&P
expected to be hospitalized for > than 2 midnights based on the following assessment and plan:      ASSESSMENT/PLAN:    Rt femoral neck fracture - s/p fall, ortho consulted in ED. Plan Rt ANT tomorrow. Pain control, NPO after MN and OR tomorrow. Will need PT/OT eval in am post surgery    Cough - productive. Check CXR, order tessalon pearls/robitussin. Abx pending CXR    H/o asthma - will cont neb/inhaler      DVT Prophylaxis: lovenox  Diet: Diet NPO Exceptions are: Sips of Water with Meds  ADULT DIET; Regular  Diet NPO Exceptions are: Sips of Water with Meds  Code Status: Full Code  PT/OT Eval Status: ordered    Dispo - cont care       Grayson Fitzpatrick MD    Thank you Junito Godinez for the opportunity to be involved in this patient's care. If you have any questions or concerns please feel free to contact me at [phone].

## 2023-09-25 NOTE — PROGRESS NOTES
[facility] VALERIE Scanner Results  Pt was scanned according to 's recommendations. Admission      Site Reading Redness noted? Y/N   Right heel 0.2 n   Left Heel 0.2 n   Sacrum 0.3 n       [x]  VALERIE Delta score < 0.6 indicates normal, healthy tissue at this time. Continue to assess daily and implement routine pressure injury prevention measures using the Ramana Order Set. Scan weekly on Wednesday. [] VALERIE Delta score > or = to 0.6 indicates at risk tissue. Implement target prevention interventions below and scan daily.     [x] Ramana orders reviewed and updated if indicated  [x] Educated patient/family on importance of offloading/repositioning  [x] Patient agreeable to plan for pressure offloading/repositioning    Extra pillows      [] Nutrition consult made  [x] Nutrition consult not indicated at this time     [] Sacral foam border in place  [] Sacral foam border not in place, barrier cream applied    [] Low air loss mattress (or Isoflex blue/orange mattress) ordered/placed   [x] For heels, apply liquid skin barrier twice daily and ensure offloading with pillows or boots

## 2023-09-25 NOTE — CONSULTS
[facility] Orthopedic Surgery  Consult Note    This patient is seen in consultation at the request of Dr Wilbur Price    Reason for Consult:  right hip pain    CHIEF COMPLAINT: right hip pain    History Obtained From:      HISTORY OF PRESENT ILLNESS:    The patient is a 52 y.o. female who presents with right hip pini after a fall in her home this AM. Pain is described in She states she shuffles her feet and tripped over her swiffer mop which had fallen to the floor. She was unable to stand due to severe right hip pain. In ER noted with right hip fracture Pain is described as aching, shooting. Discomfort is intermittent. Right hip pain worse with moving right leg and better at rest. She is alert and oriented. Hx failed back syndrome and FM. She states she stopped Methadone many months ago. Hx COPD on steroid inhalers. No other complaints. Prior left TKA with infection and she reports spacer still in place for some time per Dr Michael Garcia     Past Medical History:        Diagnosis Date    Anxiety and depression     Arthritis     Asthma     Chronic pain syndrome     Complication of joint prosthesis ([address])     COPD (chronic obstructive pulmonary disease) ([address])     Failed back surgical syndrome     Failed neck syndrome     Fibromyalgia     GERD (gastroesophageal reflux disease)     Influenza A 2019    MDRO (multiple drug resistant organisms) resistance 2018    sputum    Obesity (BMI 30-39. 9)     Pneumonia        Past Surgical History:        Procedure Laterality Date    ABDOMEN SURGERY      BACK SURGERY       SECTION      x2    ENDOSCOPY, COLON, DIAGNOSTIC      HERNIA REPAIR      JOINT REPLACEMENT      right knee    KNEE ARTHROCENTESIS      removal of bilateral knees    SPINE SURGERY      5  back surgeries     UPPER GASTROINTESTINAL ENDOSCOPY  2015       Social History     Tobacco Use    Smoking status: Never    Smokeless tobacco: Never    Tobacco comments:     encouraged to never smoke    Substance Use left hip or bilateral knees or ankles. FROM bilateral arms with no pain or limitations Left TKA scar well healed, no erythema or warmth. NEUROLOGIC:   Sensory:    Touch:                                    Right Lower Extremity:  normal                  Left Lower Extremity:  normal  Skin warm and dry  Resp deep and easy  Abdomen soft and round  Pulse is with regular rate and rhythm    DATA:    CBC:   Lab Results   Component Value Date/Time    WBC 7.4 09/25/2023 12:05 PM    RBC 4.56 09/25/2023 12:05 PM    HGB 12.9 09/25/2023 12:05 PM    HCT 37.4 09/25/2023 12:05 PM    MCV 82.0 09/25/2023 12:05 PM    MCH 28.2 09/25/2023 12:05 PM    MCHC 34.4 09/25/2023 12:05 PM    RDW 13.2 09/25/2023 12:05 PM     09/25/2023 12:05 PM    MPV 6.9 09/25/2023 12:05 PM     WBC:    Lab Results   Component Value Date/Time    WBC 7.4 09/25/2023 12:05 PM     PT/INR:    Lab Results   Component Value Date/Time    PROTIME 13.6 09/25/2023 12:05 PM    INR 1.04 09/25/2023 12:05 PM     PTT:    Lab Results   Component Value Date/Time    APTT 26.3 09/25/2023 12:05 PM   [APTT  Narrative & Impression  EXAMINATION:  TWO XRAY VIEWS OF THE RIGHT HIP     9/25/2023 11:17 am     COMPARISON:  None. HISTORY:  ORDERING SYSTEM PROVIDED HISTORY: fall, injury  TECHNOLOGIST PROVIDED HISTORY:  Reason for exam:->fall, injury  Reason for Exam: fall, injury     FINDINGS:  Right femoral neck fracture. IMPRESSION:  Right femoral neck fractureRadiology Review:          IMPRESSION/RECOMMENDATIONS:    Fall at home  Right femoral neck fracture, plan arthroplasty per Dr Wanda Dutton tomorrow  NPO after MN for surgery  Bedrest  Pain med as ordered  Medical mgmt per hospitalist.   Discussed with Dr Liat Barrera via [employer].      Raquel Paz, APRN - CNP  9/25/2023  2:00 PM

## 2023-09-25 NOTE — ED TRIAGE NOTES
Pt with mechanical fall around 730 this morning. States that she tripped over broom. Pt states she landed on right hip. Unable to bear weight on leg/hip.

## 2023-09-25 NOTE — PLAN OF CARE
Problem: Discharge Planning  Goal: Discharge to home or other facility with appropriate resources  Outcome: Progressing  Flowsheets (Taken 9/25/2023 1528)  Discharge to home or other facility with appropriate resources:   Identify barriers to discharge with patient and caregiver   Arrange for needed discharge resources and transportation as appropriate     Problem: Pain  Goal: Verbalizes/displays adequate comfort level or baseline comfort level  Outcome: Progressing     Problem: Skin/Tissue Integrity  Goal: Absence of new skin breakdown  Description: 1. Monitor for areas of redness and/or skin breakdown  2. Assess vascular access sites hourly  3. Every 4-6 hours minimum:  Change oxygen saturation probe site  4. Every 4-6 hours:  If on nasal continuous positive airway pressure, respiratory therapy assess nares and determine need for appliance change or resting period.   Outcome: Progressing     Problem: Safety - Adult  Goal: Free from fall injury  Outcome: Progressing     Problem: ABCDS Injury Assessment  Goal: Absence of physical injury  Outcome: Progressing  Flowsheets (Taken 9/25/2023 1659)  Absence of Physical Injury: Implement safety measures based on patient assessment

## 2023-09-25 NOTE — PROGRESS NOTES
Pt arrived to unit at 1512 . Pt is alert and oriented X4. Pt oriented to unit and to room. Pt oriented to call light and to phone. White board updated. Pt denies any further needs at this time.  Electronically signed by Robert Gao RN on 9/25/2023 at 3:13 PM

## 2023-09-25 NOTE — PROGRESS NOTES
Pharmacy Medication Reconciliation Note     List of medications Jimmie Tobin is currently taking is complete. Source of information:   1. Conversation with patient at bedside  2. Outpatient dispense report    Notes regarding home medications:   1. Removed albuterol inhaler, Symbicort, Flonase, Duoneb, melatonin, and methadone as patient no longer takes the medications  2.  Added promethazine and Sudafed paulon     Jackie Harkins, Pharmacy Student  9/25/2023  1:31 PM

## 2023-09-25 NOTE — ED PROVIDER NOTES
[address] [facility] [address] [address]        Pt Name: Brianne Avitia  MRN: [id number]  [address] [facility] 1970  Date of evaluation: 9/25/2023  Provider: Linda Rinne, PA-C  PCP: Jorge Alberto Sosa  Note Started: 10:45 AM EDT 9/25/23       I have seen and evaluated this patient with my supervising physician Elidia Caraballo MD.      [address]       Chief Complaint   Patient presents with    Fall     Pt with mechanical fall around 730 this morning. States that she tripped over broom. Pt states she landed on right hip. Unable to bear weight on leg/hip. HISTORY OF PRESENT ILLNESS: 1 or more Elements     History From: patient   Limitations to history : None    Brianne Avitia is a 52 y.o. female who presents for evaluation of right hip pain status post mechanical fall. Patient states that she tripped over her slipper sweeper today and landed on her right hip. She was not able to get up off of the floor and has not been able to bear weight since injury. Denies numbness tingling or weakness distally. States the pain radiates into her groin. Denies hitting her head or loss of consciousness. No other injuries or complaints at this time. Nursing Notes were all reviewed and agreed with or any disagreements were addressed in the HPI. REVIEW OF SYSTEMS :      Review of Systems   Constitutional:  Negative for appetite change, chills and fever. HENT:  Negative for congestion and rhinorrhea. Respiratory:  Negative for cough, shortness of breath and wheezing. Cardiovascular:  Negative for chest pain. Gastrointestinal:  Negative for abdominal pain, diarrhea, nausea and vomiting. Genitourinary:  Negative for difficulty urinating, dysuria and hematuria. Musculoskeletal:  Positive for arthralgias (R hip). Negative for neck pain and neck stiffness. Skin:  Negative for rash. Neurological:  Negative for weakness, numbness and headaches.

## 2023-09-25 NOTE — PLAN OF CARE
Per Dr Jourdan Diego. Plan bedrest, NWB right leg. Right hip arthroplasty on Tues. NPO after MN.  I will see pt today

## 2023-09-25 NOTE — PROGRESS NOTES
Educated patient on purpose of 4 eyes skin assessment and asked patient if allowed to perform, patient verbalized understanding and agreed to assessment. 4 Eyes Skin Assessment     The patient is being assess for  Admission    I agree that 2 RN's have performed a thorough Head to Toe Skin Assessment on the patient. ALL assessment sites listed below have been assessed. Areas assessed by both nurses:   [x]   Head, Face, and Ears   [x]   Shoulders, Back, and Chest  [x]   Arms, Elbows, and Hands   [x]   Coccyx, Sacrum, and IschIum  [x]   Legs, Feet, and Heels        Does the Patient have Skin Breakdown?   No         Ramana Prevention initiated:  Yes   Wound Care Orders initiated:  No      C nurse consulted for Pressure Injury (Stage 3,4, Unstageable, DTI, NWPT, and Complex wounds), New and Established Ostomies:  No      Nurse 1 eSignature: Electronically signed by Willi Avila RN on 9/25/23 at 5:31 PM EDT    **SHARE this note so that the co-signing nurse is able to place an eSignature**    Nurse 2 eSignature: Electronically signed by Jose Antonio Denise RN on 9/25/23 at 6:11 PM EDT

## 2023-09-25 NOTE — CARE COORDINATION
SW attempted to see patient today, however, she is reporting that she is in pain. Bedside nurse informed that she is medicaid. SW will check back after lunch. Respectfully submitted,    Asha Frey Due SONY, [facility]   [phone]    Electronically signed by SONY Ramon, ALTHEA on 9/25/2023 at 1:00 PM

## 2023-09-26 ENCOUNTER — APPOINTMENT (OUTPATIENT)
Dept: GENERAL RADIOLOGY | Age: 53
End: 2023-09-26
Payer: MEDICARE

## 2023-09-26 ENCOUNTER — ANESTHESIA (OUTPATIENT)
Dept: OPERATING ROOM | Age: 53
End: 2023-09-26
Payer: MEDICARE

## 2023-09-26 LAB
ANION GAP SERPL CALCULATED.3IONS-SCNC: 6 MMOL/L (ref 3–16)
BASOPHILS # BLD: 0 K/UL (ref 0–0.2)
BASOPHILS NFR BLD: 0.9 %
BUN SERPL-MCNC: 23 MG/DL (ref 7–20)
CALCIUM SERPL-MCNC: 8 MG/DL (ref 8.3–10.6)
CHLORIDE SERPL-SCNC: 105 MMOL/L (ref 99–110)
CO2 SERPL-SCNC: 25 MMOL/L (ref 21–32)
CREAT SERPL-MCNC: 0.6 MG/DL (ref 0.6–1.1)
DEPRECATED RDW RBC AUTO: 13.3 % (ref 12.4–15.4)
EOSINOPHIL # BLD: 0.5 K/UL (ref 0–0.6)
EOSINOPHIL NFR BLD: 9.6 %
GFR SERPLBLD CREATININE-BSD FMLA CKD-EPI: >60 ML/MIN/{1.73_M2}
GLUCOSE SERPL-MCNC: 110 MG/DL (ref 70–99)
HCT VFR BLD AUTO: 36.7 % (ref 36–48)
HGB BLD-MCNC: 12.8 G/DL (ref 12–16)
LYMPHOCYTES # BLD: 1 K/UL (ref 1–5.1)
LYMPHOCYTES NFR BLD: 19.5 %
MCH RBC QN AUTO: 28.5 PG (ref 26–34)
MCHC RBC AUTO-ENTMCNC: 34.9 G/DL (ref 31–36)
MCV RBC AUTO: 81.7 FL (ref 80–100)
MONOCYTES # BLD: 0.4 K/UL (ref 0–1.3)
MONOCYTES NFR BLD: 8.4 %
NEUTROPHILS # BLD: 3.2 K/UL (ref 1.7–7.7)
NEUTROPHILS NFR BLD: 61.6 %
PLATELET # BLD AUTO: 191 K/UL (ref 135–450)
PMV BLD AUTO: 7 FL (ref 5–10.5)
POTASSIUM SERPL-SCNC: 4 MMOL/L (ref 3.5–5.1)
RBC # BLD AUTO: 4.49 M/UL (ref 4–5.2)
SODIUM SERPL-SCNC: 136 MMOL/L (ref 136–145)
WBC # BLD AUTO: 5.3 K/UL (ref 4–11)

## 2023-09-26 PROCEDURE — 7100000001 HC PACU RECOVERY - ADDTL 15 MIN: Performed by: ORTHOPAEDIC SURGERY

## 2023-09-26 PROCEDURE — C1776 JOINT DEVICE (IMPLANTABLE): HCPCS | Performed by: ORTHOPAEDIC SURGERY

## 2023-09-26 PROCEDURE — 6360000002 HC RX W HCPCS: Performed by: INTERNAL MEDICINE

## 2023-09-26 PROCEDURE — A4217 STERILE WATER/SALINE, 500 ML: HCPCS | Performed by: ORTHOPAEDIC SURGERY

## 2023-09-26 PROCEDURE — 6360000002 HC RX W HCPCS: Performed by: NURSE PRACTITIONER

## 2023-09-26 PROCEDURE — 72170 X-RAY EXAM OF PELVIS: CPT

## 2023-09-26 PROCEDURE — 6370000000 HC RX 637 (ALT 250 FOR IP): Performed by: NURSE PRACTITIONER

## 2023-09-26 PROCEDURE — 3600000004 HC SURGERY LEVEL 4 BASE: Performed by: ORTHOPAEDIC SURGERY

## 2023-09-26 PROCEDURE — 2580000003 HC RX 258: Performed by: ORTHOPAEDIC SURGERY

## 2023-09-26 PROCEDURE — 2500000003 HC RX 250 WO HCPCS

## 2023-09-26 PROCEDURE — 2580000003 HC RX 258: Performed by: NURSE PRACTITIONER

## 2023-09-26 PROCEDURE — 80048 BASIC METABOLIC PNL TOTAL CA: CPT

## 2023-09-26 PROCEDURE — 6360000002 HC RX W HCPCS: Performed by: ORTHOPAEDIC SURGERY

## 2023-09-26 PROCEDURE — 2709999900 HC NON-CHARGEABLE SUPPLY: Performed by: ORTHOPAEDIC SURGERY

## 2023-09-26 PROCEDURE — 85025 COMPLETE CBC W/AUTO DIFF WBC: CPT

## 2023-09-26 PROCEDURE — 6360000002 HC RX W HCPCS

## 2023-09-26 PROCEDURE — 6360000002 HC RX W HCPCS: Performed by: ANESTHESIOLOGY

## 2023-09-26 PROCEDURE — 2720000010 HC SURG SUPPLY STERILE: Performed by: ORTHOPAEDIC SURGERY

## 2023-09-26 PROCEDURE — 3700000000 HC ANESTHESIA ATTENDED CARE: Performed by: ORTHOPAEDIC SURGERY

## 2023-09-26 PROCEDURE — 2580000003 HC RX 258: Performed by: ANESTHESIOLOGY

## 2023-09-26 PROCEDURE — 2580000003 HC RX 258: Performed by: INTERNAL MEDICINE

## 2023-09-26 PROCEDURE — 3700000001 HC ADD 15 MINUTES (ANESTHESIA): Performed by: ORTHOPAEDIC SURGERY

## 2023-09-26 PROCEDURE — 1200000000 HC SEMI PRIVATE

## 2023-09-26 PROCEDURE — 6370000000 HC RX 637 (ALT 250 FOR IP): Performed by: INTERNAL MEDICINE

## 2023-09-26 PROCEDURE — 3600000014 HC SURGERY LEVEL 4 ADDTL 15MIN: Performed by: ORTHOPAEDIC SURGERY

## 2023-09-26 PROCEDURE — 6370000000 HC RX 637 (ALT 250 FOR IP)

## 2023-09-26 PROCEDURE — 0SR90JZ REPLACEMENT OF RIGHT HIP JOINT WITH SYNTHETIC SUBSTITUTE, OPEN APPROACH: ICD-10-PCS | Performed by: ORTHOPAEDIC SURGERY

## 2023-09-26 PROCEDURE — 7100000000 HC PACU RECOVERY - FIRST 15 MIN: Performed by: ORTHOPAEDIC SURGERY

## 2023-09-26 PROCEDURE — 73501 X-RAY EXAM HIP UNI 1 VIEW: CPT

## 2023-09-26 PROCEDURE — 94760 N-INVAS EAR/PLS OXIMETRY 1: CPT

## 2023-09-26 DEVICE — G7 DUAL MOBILITY LINER 40MM D: Type: IMPLANTABLE DEVICE | Site: HIP | Status: FUNCTIONAL

## 2023-09-26 DEVICE — SHELL ACET SZ D DIA50MM 3 H OSSEOTI LIMIT H 2 MOBILITY G7: Type: IMPLANTABLE DEVICE | Site: HIP | Status: FUNCTIONAL

## 2023-09-26 DEVICE — BIOLOX® DELTA, CERAMIC FEMORAL HEAD, L, Ø 28/+3.5, TAPER 12/14
Type: IMPLANTABLE DEVICE | Site: HIP | Status: FUNCTIONAL
Brand: BIOLOX® DELTA

## 2023-09-26 DEVICE — AVENIR COMPLETE STANDARD COLLARED SIZE 4: Type: IMPLANTABLE DEVICE | Site: HIP | Status: FUNCTIONAL

## 2023-09-26 DEVICE — SCREW BNE L20MM DIA6.5MM ACET HIP TI ST TRIL: Type: IMPLANTABLE DEVICE | Site: HIP | Status: FUNCTIONAL

## 2023-09-26 DEVICE — BONE SCREW 6.5X25 SELF-TAP: Type: IMPLANTABLE DEVICE | Site: HIP | Status: FUNCTIONAL

## 2023-09-26 DEVICE — BONE SCREW 6.5X35 SELF-TAP: Type: IMPLANTABLE DEVICE | Site: HIP | Status: FUNCTIONAL

## 2023-09-26 DEVICE — VIVACIT-E DM BEARING 28X40MM: Type: IMPLANTABLE DEVICE | Site: HIP | Status: FUNCTIONAL

## 2023-09-26 RX ORDER — LIDOCAINE HYDROCHLORIDE 20 MG/ML
INJECTION, SOLUTION EPIDURAL; INFILTRATION; INTRACAUDAL; PERINEURAL PRN
Status: DISCONTINUED | OUTPATIENT
Start: 2023-09-26 | End: 2023-09-26 | Stop reason: SDUPTHER

## 2023-09-26 RX ORDER — MEPERIDINE HYDROCHLORIDE 25 MG/ML
12.5 INJECTION INTRAMUSCULAR; INTRAVENOUS; SUBCUTANEOUS
Status: DISCONTINUED | OUTPATIENT
Start: 2023-09-26 | End: 2023-09-26 | Stop reason: HOSPADM

## 2023-09-26 RX ORDER — MAGNESIUM HYDROXIDE 1200 MG/15ML
LIQUID ORAL CONTINUOUS PRN
Status: COMPLETED | OUTPATIENT
Start: 2023-09-26 | End: 2023-09-26

## 2023-09-26 RX ORDER — LORAZEPAM 2 MG/ML
0.5 INJECTION INTRAMUSCULAR
Status: DISCONTINUED | OUTPATIENT
Start: 2023-09-26 | End: 2023-09-26 | Stop reason: HOSPADM

## 2023-09-26 RX ORDER — DEXAMETHASONE SODIUM PHOSPHATE 4 MG/ML
INJECTION, SOLUTION INTRA-ARTICULAR; INTRALESIONAL; INTRAMUSCULAR; INTRAVENOUS; SOFT TISSUE PRN
Status: DISCONTINUED | OUTPATIENT
Start: 2023-09-26 | End: 2023-09-26 | Stop reason: SDUPTHER

## 2023-09-26 RX ORDER — ONDANSETRON 2 MG/ML
4 INJECTION INTRAMUSCULAR; INTRAVENOUS
Status: DISCONTINUED | OUTPATIENT
Start: 2023-09-26 | End: 2023-09-26 | Stop reason: HOSPADM

## 2023-09-26 RX ORDER — SODIUM CHLORIDE 0.9 % (FLUSH) 0.9 %
5-40 SYRINGE (ML) INJECTION PRN
Status: DISCONTINUED | OUTPATIENT
Start: 2023-09-26 | End: 2023-09-26 | Stop reason: HOSPADM

## 2023-09-26 RX ORDER — VANCOMYCIN HYDROCHLORIDE 1 G/20ML
INJECTION, POWDER, LYOPHILIZED, FOR SOLUTION INTRAVENOUS
Status: COMPLETED | OUTPATIENT
Start: 2023-09-26 | End: 2023-09-26

## 2023-09-26 RX ORDER — ROCURONIUM BROMIDE 10 MG/ML
INJECTION, SOLUTION INTRAVENOUS PRN
Status: DISCONTINUED | OUTPATIENT
Start: 2023-09-26 | End: 2023-09-26 | Stop reason: SDUPTHER

## 2023-09-26 RX ORDER — ALBUTEROL SULFATE 90 UG/1
AEROSOL, METERED RESPIRATORY (INHALATION) PRN
Status: DISCONTINUED | OUTPATIENT
Start: 2023-09-26 | End: 2023-09-26 | Stop reason: SDUPTHER

## 2023-09-26 RX ORDER — PROPOFOL 10 MG/ML
INJECTION, EMULSION INTRAVENOUS PRN
Status: DISCONTINUED | OUTPATIENT
Start: 2023-09-26 | End: 2023-09-26 | Stop reason: SDUPTHER

## 2023-09-26 RX ORDER — DIPHENHYDRAMINE HYDROCHLORIDE 50 MG/ML
12.5 INJECTION INTRAMUSCULAR; INTRAVENOUS
Status: DISCONTINUED | OUTPATIENT
Start: 2023-09-26 | End: 2023-09-26 | Stop reason: HOSPADM

## 2023-09-26 RX ORDER — HALOPERIDOL 5 MG/ML
1 INJECTION INTRAMUSCULAR
Status: COMPLETED | OUTPATIENT
Start: 2023-09-26 | End: 2023-09-26

## 2023-09-26 RX ORDER — MIDAZOLAM HYDROCHLORIDE 1 MG/ML
INJECTION INTRAMUSCULAR; INTRAVENOUS PRN
Status: DISCONTINUED | OUTPATIENT
Start: 2023-09-26 | End: 2023-09-26 | Stop reason: SDUPTHER

## 2023-09-26 RX ORDER — SODIUM CHLORIDE 9 MG/ML
INJECTION, SOLUTION INTRAVENOUS PRN
Status: DISCONTINUED | OUTPATIENT
Start: 2023-09-26 | End: 2023-09-26 | Stop reason: HOSPADM

## 2023-09-26 RX ORDER — PHENYLEPHRINE HCL IN 0.9% NACL 1 MG/10 ML
SYRINGE (ML) INTRAVENOUS PRN
Status: DISCONTINUED | OUTPATIENT
Start: 2023-09-26 | End: 2023-09-26 | Stop reason: SDUPTHER

## 2023-09-26 RX ORDER — FENTANYL CITRATE 0.05 MG/ML
50 INJECTION, SOLUTION INTRAMUSCULAR; INTRAVENOUS EVERY 5 MIN PRN
Status: DISCONTINUED | OUTPATIENT
Start: 2023-09-26 | End: 2023-09-26 | Stop reason: HOSPADM

## 2023-09-26 RX ORDER — ONDANSETRON 2 MG/ML
INJECTION INTRAMUSCULAR; INTRAVENOUS PRN
Status: DISCONTINUED | OUTPATIENT
Start: 2023-09-26 | End: 2023-09-26 | Stop reason: SDUPTHER

## 2023-09-26 RX ORDER — FENTANYL CITRATE 50 UG/ML
INJECTION, SOLUTION INTRAMUSCULAR; INTRAVENOUS PRN
Status: DISCONTINUED | OUTPATIENT
Start: 2023-09-26 | End: 2023-09-26 | Stop reason: SDUPTHER

## 2023-09-26 RX ORDER — SODIUM CHLORIDE 0.9 % (FLUSH) 0.9 %
5-40 SYRINGE (ML) INJECTION EVERY 12 HOURS SCHEDULED
Status: DISCONTINUED | OUTPATIENT
Start: 2023-09-26 | End: 2023-09-26 | Stop reason: HOSPADM

## 2023-09-26 RX ADMIN — MIDAZOLAM 1 MG: 1 INJECTION INTRAMUSCULAR; INTRAVENOUS at 15:16

## 2023-09-26 RX ADMIN — FENTANYL CITRATE 50 MCG: 50 INJECTION INTRAMUSCULAR; INTRAVENOUS at 15:20

## 2023-09-26 RX ADMIN — SODIUM CHLORIDE, PRESERVATIVE FREE 10 ML: 5 INJECTION INTRAVENOUS at 22:06

## 2023-09-26 RX ADMIN — HYDROMORPHONE HYDROCHLORIDE 0.5 MG: 1 INJECTION, SOLUTION INTRAMUSCULAR; INTRAVENOUS; SUBCUTANEOUS at 05:58

## 2023-09-26 RX ADMIN — Medication 100 MCG: at 16:15

## 2023-09-26 RX ADMIN — ROCURONIUM BROMIDE 10 MG: 10 INJECTION, SOLUTION INTRAVENOUS at 16:24

## 2023-09-26 RX ADMIN — Medication 100 MCG: at 15:33

## 2023-09-26 RX ADMIN — OXYCODONE HYDROCHLORIDE 5 MG: 5 TABLET ORAL at 19:00

## 2023-09-26 RX ADMIN — CEFAZOLIN 2000 MG: 2 INJECTION, POWDER, FOR SOLUTION INTRAMUSCULAR; INTRAVENOUS at 15:25

## 2023-09-26 RX ADMIN — ROCURONIUM BROMIDE 50 MG: 10 INJECTION, SOLUTION INTRAVENOUS at 15:22

## 2023-09-26 RX ADMIN — HYDROMORPHONE HYDROCHLORIDE 0.25 MG: 1 INJECTION, SOLUTION INTRAMUSCULAR; INTRAVENOUS; SUBCUTANEOUS at 17:35

## 2023-09-26 RX ADMIN — Medication 200 MCG: at 16:48

## 2023-09-26 RX ADMIN — MIDAZOLAM 1 MG: 1 INJECTION INTRAMUSCULAR; INTRAVENOUS at 15:19

## 2023-09-26 RX ADMIN — FENTANYL CITRATE 50 MCG: 50 INJECTION INTRAMUSCULAR; INTRAVENOUS at 17:22

## 2023-09-26 RX ADMIN — Medication 1000 MG: at 15:31

## 2023-09-26 RX ADMIN — HYDROMORPHONE HYDROCHLORIDE 0.5 MG: 1 INJECTION, SOLUTION INTRAMUSCULAR; INTRAVENOUS; SUBCUTANEOUS at 22:02

## 2023-09-26 RX ADMIN — ALBUTEROL SULFATE 4 PUFF: 90 AEROSOL, METERED RESPIRATORY (INHALATION) at 16:58

## 2023-09-26 RX ADMIN — SODIUM CHLORIDE: 9 INJECTION, SOLUTION INTRAVENOUS at 15:04

## 2023-09-26 RX ADMIN — Medication 200 MCG: at 16:35

## 2023-09-26 RX ADMIN — GUAIFENESIN SYRUP AND DEXTROMETHORPHAN 5 ML: 100; 10 SYRUP ORAL at 04:36

## 2023-09-26 RX ADMIN — PROPOFOL 150 MG: 10 INJECTION, EMULSION INTRAVENOUS at 15:21

## 2023-09-26 RX ADMIN — HYDROMORPHONE HYDROCHLORIDE 0.25 MG: 1 INJECTION, SOLUTION INTRAMUSCULAR; INTRAVENOUS; SUBCUTANEOUS at 17:40

## 2023-09-26 RX ADMIN — SODIUM CHLORIDE, PRESERVATIVE FREE 10 ML: 5 INJECTION INTRAVENOUS at 10:02

## 2023-09-26 RX ADMIN — HALOPERIDOL LACTATE 1 MG: 5 INJECTION, SOLUTION INTRAMUSCULAR at 17:51

## 2023-09-26 RX ADMIN — OXYCODONE HYDROCHLORIDE 5 MG: 5 TABLET ORAL at 04:36

## 2023-09-26 RX ADMIN — FENTANYL CITRATE 50 MCG: 0.05 INJECTION, SOLUTION INTRAMUSCULAR; INTRAVENOUS at 17:48

## 2023-09-26 RX ADMIN — ONDANSETRON 4 MG: 2 INJECTION INTRAMUSCULAR; INTRAVENOUS at 15:30

## 2023-09-26 RX ADMIN — CEFAZOLIN 2000 MG: 2 INJECTION, POWDER, FOR SOLUTION INTRAMUSCULAR; INTRAVENOUS at 23:33

## 2023-09-26 RX ADMIN — HYDROMORPHONE HYDROCHLORIDE 0.25 MG: 1 INJECTION, SOLUTION INTRAMUSCULAR; INTRAVENOUS; SUBCUTANEOUS at 14:55

## 2023-09-26 RX ADMIN — DEXAMETHASONE SODIUM PHOSPHATE 10 MG: 4 INJECTION, SOLUTION INTRAMUSCULAR; INTRAVENOUS at 15:30

## 2023-09-26 RX ADMIN — Medication 100 MCG: at 16:02

## 2023-09-26 RX ADMIN — HYDROMORPHONE HYDROCHLORIDE 0.5 MG: 1 INJECTION, SOLUTION INTRAMUSCULAR; INTRAVENOUS; SUBCUTANEOUS at 10:01

## 2023-09-26 RX ADMIN — SUGAMMADEX 200 MG: 100 INJECTION, SOLUTION INTRAVENOUS at 17:08

## 2023-09-26 RX ADMIN — LIDOCAINE HYDROCHLORIDE 100 MG: 20 INJECTION, SOLUTION EPIDURAL; INFILTRATION; INTRACAUDAL; PERINEURAL at 15:21

## 2023-09-26 ASSESSMENT — PAIN SCALES - GENERAL
PAINLEVEL_OUTOF10: 8
PAINLEVEL_OUTOF10: 10
PAINLEVEL_OUTOF10: 10
PAINLEVEL_OUTOF10: 6
PAINLEVEL_OUTOF10: 9
PAINLEVEL_OUTOF10: 0
PAINLEVEL_OUTOF10: 6
PAINLEVEL_OUTOF10: 9
PAINLEVEL_OUTOF10: 6
PAINLEVEL_OUTOF10: 8
PAINLEVEL_OUTOF10: 8

## 2023-09-26 ASSESSMENT — PAIN DESCRIPTION - FREQUENCY
FREQUENCY: CONTINUOUS

## 2023-09-26 ASSESSMENT — PAIN - FUNCTIONAL ASSESSMENT
PAIN_FUNCTIONAL_ASSESSMENT: PREVENTS OR INTERFERES SOME ACTIVE ACTIVITIES AND ADLS
PAIN_FUNCTIONAL_ASSESSMENT: PREVENTS OR INTERFERES WITH MANY ACTIVE NOT PASSIVE ACTIVITIES
PAIN_FUNCTIONAL_ASSESSMENT: PREVENTS OR INTERFERES SOME ACTIVE ACTIVITIES AND ADLS
PAIN_FUNCTIONAL_ASSESSMENT: PREVENTS OR INTERFERES WITH MANY ACTIVE NOT PASSIVE ACTIVITIES
PAIN_FUNCTIONAL_ASSESSMENT: 0-10
PAIN_FUNCTIONAL_ASSESSMENT: PREVENTS OR INTERFERES WITH MANY ACTIVE NOT PASSIVE ACTIVITIES
PAIN_FUNCTIONAL_ASSESSMENT: 0-10
PAIN_FUNCTIONAL_ASSESSMENT: PREVENTS OR INTERFERES SOME ACTIVE ACTIVITIES AND ADLS

## 2023-09-26 ASSESSMENT — LIFESTYLE VARIABLES: SMOKING_STATUS: 0

## 2023-09-26 ASSESSMENT — PAIN DESCRIPTION - ORIENTATION
ORIENTATION: RIGHT
ORIENTATION: RIGHT;OUTER
ORIENTATION: RIGHT
ORIENTATION: RIGHT
ORIENTATION: LEFT
ORIENTATION: RIGHT

## 2023-09-26 ASSESSMENT — PAIN DESCRIPTION - LOCATION
LOCATION: HIP

## 2023-09-26 ASSESSMENT — PAIN DESCRIPTION - ONSET
ONSET: ON-GOING

## 2023-09-26 ASSESSMENT — PAIN DESCRIPTION - PAIN TYPE
TYPE: SURGICAL PAIN;ACUTE PAIN
TYPE: ACUTE PAIN
TYPE: ACUTE PAIN
TYPE: ACUTE PAIN;SURGICAL PAIN

## 2023-09-26 ASSESSMENT — PAIN DESCRIPTION - DESCRIPTORS
DESCRIPTORS: THROBBING
DESCRIPTORS: THROBBING;SHARP
DESCRIPTORS: THROBBING
DESCRIPTORS: THROBBING
DESCRIPTORS: ACHING
DESCRIPTORS: BURNING
DESCRIPTORS: THROBBING
DESCRIPTORS: ACHING
DESCRIPTORS: THROBBING
DESCRIPTORS: ACHING
DESCRIPTORS: THROBBING

## 2023-09-26 NOTE — CARE COORDINATION
Case Management Assessment  Initial Evaluation    Date/Time of Evaluation: 9/26/2023 12:55 PM  Assessment Completed by: SONY Mckay, ALTHEA    If patient is discharged prior to next notation, then this note serves as note for discharge by case management. Patient Name: Ann Marie Jeffries                   YOB: 1970  Diagnosis: Closed fracture of neck of right femur, initial encounter ([address]) [S72.001A]  Hip fracture requiring operative repair, left, closed, initial encounter ([address]) Tara Rota                   Date / Time: 9/25/2023 10:23 AM    Patient Admission Status: Inpatient   Readmission Risk (Low < 19, Mod (19-27), High > 27): No data recorded  Current PCP: Matt Astorga  PCP verified by CM? Yes    Chart Reviewed: Yes      History Provided by: Patient  Patient Orientation: Alert and Oriented    Patient Cognition: Alert    Hospitalization in the last 30 days (Readmission):  No    If yes, Readmission Assessment in CM Navigator will be completed. Advance Directives:      Code Status: Full Code   Patient's Primary Decision Maker is: Legal Next of Kin      Discharge Planning:    Patient lives with: Children Type of Home: Apartment  Primary Care Giver: Self  Patient Support Systems include: Children, Family Members   Current Financial resources: None  Current community resources: None  Current services prior to admission: None            Current DME:              Type of Home Care services:  None    ADLS  Prior functional level: Independent in ADLs/IADLs  Current functional level: Other (see comment) (unsure at the present time.)    PT AM-PAC:   /24  OT AM-PAC:   /24    Family can provide assistance at DC: Yes  Would you like Case Management to discuss the discharge plan with any other family members/significant others, and if so, who? No  Plans to Return to Present Housing: Unknown at present  Other Identified Issues/Barriers to RETURNING to current housing: None noted at this time.    Potential Assistance needed at discharge: [address] [facility]            Potential DME: Rico Vinson  Patient expects to discharge to: [address] for transportation at discharge:  Unsure at the present time. Depends on destination at discharge. Financial    Payor: Margaret Anders / Plan: [employer] GOLD PLUS HMO / Product Type: *No Product type* /     Does insurance require precert for SNF: Yes    Potential assistance Purchasing Medications: No  Meds-to-Beds request:        [address] [facility] [id number] - [address] [facility]  [address] [facility] [address]  Phone: [phone] Fax: [phone]    [place] [facility] [id number] - [address] [facility], [address] [place] [address] [phone] - f [phone]  [address]  Phone: [phone] Fax: [phone]    [address] [facility] [address] [facility] [phone] [facility] [phone] [address]  [address] [address]  Phone: [phone] Fax: [phone]      Notes:    Factors facilitating achievement of predicted outcomes: Family support, Motivated, Cooperative, Pleasant, Good insight into deficits, and Has needed Durable Medical Equipment at home    Barriers to discharge: None noted at this time. Additional Case Management Notes:   1) Waiting on ortho clearance and therapy recs. The Plan for Transition of Care is related to the following treatment goals of Closed fracture of neck of right femur, initial encounter ([address]) [S72.001A]  Hip fracture requiring operative repair, left, closed, initial encounter ([address]) Oval Monk    The Patient and/or Patient Representative Agree with the Discharge Plan? Yes. Respectfully submitted,    Asha CARDOZA, [facility]   [phone]    Electronically signed by SONY Botello, HANNAW on 9/26/2023 at 12:55 PM

## 2023-09-26 NOTE — PROGRESS NOTES
Attempted to call family. Line was disconnected.      Oma Camacho MD  Orthopedic Surgery, Adult Reconstruction

## 2023-09-26 NOTE — PLAN OF CARE
Problem: Discharge Planning  Goal: Discharge to home or other facility with appropriate resources  9/26/2023 1021 by James Loera RN  Outcome: Progressing  Flowsheets (Taken 9/25/2023 2145 by Maddy Saez RN)  Discharge to home or other facility with appropriate resources:   Identify barriers to discharge with patient and caregiver   Identify discharge learning needs (meds, wound care, etc)   Arrange for needed discharge resources and transportation as appropriate   Arrange for interpreters to assist at discharge as needed  9/25/2023 2145 by Maddy Saez RN  Outcome: Progressing  Flowsheets  Taken 9/25/2023 2145  Discharge to home or other facility with appropriate resources:   Identify barriers to discharge with patient and caregiver   Identify discharge learning needs (meds, wound care, etc)   Arrange for needed discharge resources and transportation as appropriate   Arrange for interpreters to assist at discharge as needed  Taken 9/25/2023 2030  Discharge to home or other facility with appropriate resources:   Identify barriers to discharge with patient and caregiver   Arrange for needed discharge resources and transportation as appropriate   Identify discharge learning needs (meds, wound care, etc)     Problem: Pain  Goal: Verbalizes/displays adequate comfort level or baseline comfort level  9/26/2023 1021 by James Loera RN  Outcome: Progressing  Flowsheets (Taken 9/25/2023 2145 by Maddy Saez RN)  Verbalizes/displays adequate comfort level or baseline comfort level:   Encourage patient to monitor pain and request assistance   Assess pain using appropriate pain scale   Administer analgesics based on type and severity of pain and evaluate response   Implement non-pharmacological measures as appropriate and evaluate response  9/25/2023 2145 by Maddy Saez RN  Outcome: Progressing  Flowsheets (Taken 9/25/2023 2145)  Verbalizes/displays adequate comfort level or baseline comfort level:   Encourage patient Physical Injury: Implement safety measures based on patient assessment  9/25/2023 2145 by Ady Oakley RN  Outcome: Progressing  Flowsheets  Taken 9/25/2023 2145  Absence of Physical Injury: Implement safety measures based on patient assessment  Taken 9/25/2023 2143  Absence of Physical Injury: Implement safety measures based on patient assessment   Electronically signed by Brielle Nickerson RN on 9/26/2023 at 10:22 AM

## 2023-09-26 NOTE — ANESTHESIA POSTPROCEDURE EVALUATION
Department of Anesthesiology  Postprocedure Note    Patient: Neo Aguilar  MRN: [id number]  YOB: 1970  Date of evaluation: 9/26/2023      Procedure Summary     Date: 09/26/23 Room / Location: [address]    Anesthesia Start: 6174 Anesthesia Stop: 3027    Procedure: RIGHT TOTAL HIP ARTHROPLASTY, ANTERIOR APPROACH (Right: Hip) Diagnosis:       Closed fracture of right hip, initial encounter ([address])      (Closed fracture of right hip, initial encounter ([address]) Benedict Chaney)    Surgeons: Simi Orantes MD Responsible Provider: Jonathan Fischer MD    Anesthesia Type: general ASA Status: 3          Anesthesia Type: No value filed.     Bell Phase I: Bell Score: 8    Bell Phase II:        Anesthesia Post Evaluation    Patient location during evaluation: PACU  Patient participation: complete - patient participated  Level of consciousness: awake and alert  Pain score: 3  Nausea & Vomiting: no nausea  Complications: no  Cardiovascular status: hemodynamically stable  Respiratory status: acceptable  Hydration status: stable  Pain management: adequate

## 2023-09-26 NOTE — PROGRESS NOTES
Pt sleeps for a few minutes after receiving pain medications, then awakens crying with pain. Will continue to monitor and medicate.

## 2023-09-26 NOTE — OP NOTE
Patient: Pinky Ruiz                    : 1970     MRN: [id number]     Date: 23     SURGEON: Jeffrey Barrera MD         ASSISTANT: SA cuong Cespedes      PREOPERATIVE DIAGNOSIS: RIGHT hip femoral neck fracture. POSTOPERATIVE DIAGNOSIS: RIGHT hip femoral neck fracture. PROCEDURE: RIGHT total hip arthroplasty. CPT: ANT 65843, Intraoperative fluoro with interpretation- [id number]    Interpretation of intra-operative imaging: AP image of the RIGHT hip showing placement of the acetabular component with impactor. There are 2 acetabular screws in place. AP of the same hip with placement of femoral component showing intramedullary implant and reduction of the femoral head within the acetabulum. There is no evidence of femoral fracture. Lastly, AP of the pelvis showing the contralateral hip with intraoperative measurement confirming leg length measurement. ANESTHESIA: General     COMPLICATIONS:  None. ESTIMATED BLOOD LOSS: 250 mL. SPECIMENS:  Bone and soft tissue to pathology per protocol. DRAINS:  None     IMPLANTS:   Implant Name Type Inv.  Item Serial No.  Lot No. LRB No. Used Action   SHELL ACET SZ D [id number] 3 H OSSEOTI LIMIT H 2 MOBILITY G7 - [id number]  SHELL ACET SZ D [id number] 3 H OSSEOTI LIMIT H 2 MOBILITY G7  [employer]- [id number] Right 1 Implanted   BONE SCREW 6.5X35 SELF-TAP - [id number]  BONE SCREW 6.5X35 SELF-TAP  [employer]- [id number] Right 1 Implanted   BONE SCREW 6.5X25 SELF-TAP - [id number]  BONE SCREW 6.5X25 SELF-TAP  [employer]- [id number] Right 1 Implanted   G7 DUAL MOBILITY LINER 40MM D - [id number]  G7 DUAL MOBILITY LINER 40MM D  [employer]- [id number] Right 1 Implanted   HEAD FEM 3.5+ MM 12/14 28 MM HIP TAPR BIOLOX DELT - [id number]  HEAD FEM 3.5+ MM 12/14 28 MM HIP TAPR BIOLOX DELT  [employer]- [id number] Right 1 Implanted   VIVACIT-E DM BEARING [id number] - [id number]

## 2023-09-26 NOTE — PROGRESS NOTES
P AAO x4 per this shift. VSS. Pt tolerating diet. Pt remain NPO after midnight for surgery. Pt c/o of high pain level throughout shift. Managed with PRN medications per MD orders, rest, repositioning, ice, and emotional support. Pt requesting Hare td/t unable to void on bedpan. Hare pain with sterile technique. Pt tolerating well. Pt stating no one explained the surgery yet. Consent not signed yet and placed in chart. No further needs voiced at this time. Fall precautions in place. Bed alarm on. Call light within reach. Will continue care.  Electronically signed by Brayden Flores RN on 9/26/2023 at 6:28 AM

## 2023-09-26 NOTE — PROGRESS NOTES
Report to floor RN see mar see flow sheet no belongings on and working SCD on and working IVF infusing per floor orders; floor nurse aware of new orders pt tx on 2 liters n/c; pt asleep  awakens agitated and requesting ice chips

## 2023-09-26 NOTE — PLAN OF CARE
Problem: Discharge Planning  Goal: Discharge to home or other facility with appropriate resources  9/25/2023 2145 by Hawk Denise RN  Outcome: Progressing  Flowsheets (Taken 9/25/2023 2145)  Discharge to home or other facility with appropriate resources:   Identify barriers to discharge with patient and caregiver   Identify discharge learning needs (meds, wound care, etc)   Arrange for needed discharge resources and transportation as appropriate   Arrange for interpreters to assist at discharge as needed  9/25/2023 1746 by Rex Desai RN  Outcome: Progressing  Flowsheets (Taken 9/25/2023 1528)  Discharge to home or other facility with appropriate resources:   Identify barriers to discharge with patient and caregiver   Arrange for needed discharge resources and transportation as appropriate     Problem: Pain  Goal: Verbalizes/displays adequate comfort level or baseline comfort level  9/25/2023 2145 by Hawk Denise RN  Outcome: Progressing  Flowsheets (Taken 9/25/2023 2145)  Verbalizes/displays adequate comfort level or baseline comfort level:   Encourage patient to monitor pain and request assistance   Assess pain using appropriate pain scale   Administer analgesics based on type and severity of pain and evaluate response   Implement non-pharmacological measures as appropriate and evaluate response  9/25/2023 1746 by Rex Desai RN  Outcome: Progressing     Problem: Skin/Tissue Integrity  Goal: Absence of new skin breakdown  Description: 1. Monitor for areas of redness and/or skin breakdown  2. Assess vascular access sites hourly  3. Every 4-6 hours minimum:  Change oxygen saturation probe site  4. Every 4-6 hours:  If on nasal continuous positive airway pressure, respiratory therapy assess nares and determine need for appliance change or resting period. 9/25/2023 2145 by Hawk Denise RN  Outcome: Progressing  Note: Ramana score assessed. Patient able to turn self.  Repositioned patient Q2H and

## 2023-09-26 NOTE — PROGRESS NOTES
Patient up to the Unit from pacu. Patient is AAOX4. Patient is drowsy but follows commands. Pedal pulses 2+ in BLE. SCD'S in place. Fall precautions in place.    Electronically signed by Ros Rodriguez RN on 9/26/2023 at 6:56 PM

## 2023-09-26 NOTE — BRIEF OP NOTE
Brief Postoperative Note      Patient: Swapna Larose  YOB: 1970  MRN: [id number]    Date of Procedure: 9/26/2023    Pre-Op Diagnosis Codes:     * Closed fracture of right hip, initial encounter ([address] ) [S72.001A]    Post-Op Diagnosis: Same       Procedure(s):  RIGHT TOTAL HIP ARTHROPLASTY, ANTERIOR APPROACH    Surgeon(s):  Juana Montgomery MD    Assistant:  Surgical Assistant: Issa Pavon    Anesthesia: General    Estimated Blood Loss (mL): 335     Complications: None    Specimens:   * No specimens in log *    Implants:  Implant Name Type Inv.  Item Serial No.  Lot No. LRB No. Used Action   SHELL ACET SZ D [id number] 3 H OSSEOTI LIMIT H 2 MOBILITY G7 - [id number]  SHELL ACET SZ D [id number] 3 H OSSEOTI LIMIT H 2 MOBILITY G7  [employer] [id number] Right 1 Implanted   BONE SCREW 6.5X35 SELF-TAP - [id number]  BONE SCREW 6.5X35 SELF-TAP  [employer] [id number] Right 1 Implanted   BONE SCREW 6.5X25 SELF-TAP - [id number]  BONE SCREW 6.5X25 SELF-TAP  [employer] [id number] Right 1 Implanted   G7 DUAL MOBILITY LINER 40MM D - [id number]  G7 DUAL MOBILITY LINER 40MM D  [employer] [id number] Right 1 Implanted   HEAD FEM 3.5+ MM 12/14 28 MM HIP TAPR BIOLOX DELT - [id number]  HEAD FEM 3.5+ MM 12/14 28 MM HIP TAPR BIOLOX DELT  [employer] [id number] Right 1 Implanted   VIVACIT-E DM BEARING 62V09FA - [id number]  VIVACIT-E DM BEARING [id number]  [employer] [id number] Right 1 Implanted   AVENIR COMPLETE STANDARD [facility] SIZE 4 - [id number]  Avenir Complete [facility] Size 4  [employer]WD [id number] Right 1 Implanted         Drains:   Urinary Catheter 09/25/23 Hare (Active)   $ Urethral catheter insertion $ Not inserted for procedure 09/25/23 2130   Catheter Indications Perioperative use for selected surgical procedures 09/25/23 2130   Site Assessment [place] 09/25/23 2130   Urine Color Yellow 09/26/23 1241   Urine Appearance Clear 09/26/23 1241   Urine Odor Malodorous 09/26/23 1241   Collection Container Standard 09/26/23 1241   Securement Method Leg strap 09/25/23 2130   Catheter Care  Perineal wipes 09/25/23 2130   Catheter Best Practices  Drainage tube clipped to bed;Catheter secured to thigh; Tamper seal intact; Bag below bladder;Bag not on floor; Lack of dependent loop in tubing;Drainage bag less than half full 09/25/23 2130   Status Draining;Patent 09/25/23 2130   Output (mL) 450 mL 09/26/23 1241   Discontinuation Reason Per nurse-driven protocol 34/28/76 2130       Findings: see operative report    Plan of care:   Weight bearing status- as tolerated on operative extremity   Precautions- ANTERIOR hip precuations  Pain control- Tylenol 1000mg TID, toradol x 48 hrs, oxycodone PRN   Hare- remove POD 1  Drain- none   Wound care- monocryl, aquacel   Antibiotics- perioperative    DVT ppx- transition to ASA x 4 weeks if mobilizing  Dispo- home pending ptot   Follow up- 2 weeks for wound check         Electronically signed by Chantale Lynne MD on 9/26/2023 at 4:59 PM

## 2023-09-26 NOTE — PROGRESS NOTES
Patient up in bed and AAOX4. Patient reporting 10/10 pain in R hip. Pain being managed with dilaudid. Head to toe assessment complete with changes documented. Patient remains NO for surgery at 3:00. Patient remains on bedrest. Fall precautions in place.   Electronically signed by Isabel Perez RN on 9/26/2023 at 10:30 AM

## 2023-09-26 NOTE — CARE COORDINATION
SW attempted to meet with patient to review possible discharge needs, however, she was asleep. SW will try again later if time permits. Respectfully submitted,    Asha CARDOZA, [facility]   [phone]    Electronically signed by SONY Caro, LSW on 9/26/2023 at 8:19 AM

## 2023-09-27 PROCEDURE — 2580000003 HC RX 258: Performed by: ORTHOPAEDIC SURGERY

## 2023-09-27 PROCEDURE — 6370000000 HC RX 637 (ALT 250 FOR IP): Performed by: NURSE PRACTITIONER

## 2023-09-27 PROCEDURE — 97162 PT EVAL MOD COMPLEX 30 MIN: CPT

## 2023-09-27 PROCEDURE — 2580000003 HC RX 258: Performed by: INTERNAL MEDICINE

## 2023-09-27 PROCEDURE — 97530 THERAPEUTIC ACTIVITIES: CPT

## 2023-09-27 PROCEDURE — 6370000000 HC RX 637 (ALT 250 FOR IP): Performed by: INTERNAL MEDICINE

## 2023-09-27 PROCEDURE — 6360000002 HC RX W HCPCS: Performed by: ORTHOPAEDIC SURGERY

## 2023-09-27 PROCEDURE — 1200000000 HC SEMI PRIVATE

## 2023-09-27 PROCEDURE — 94760 N-INVAS EAR/PLS OXIMETRY 1: CPT

## 2023-09-27 PROCEDURE — 6360000002 HC RX W HCPCS: Performed by: INTERNAL MEDICINE

## 2023-09-27 PROCEDURE — 97166 OT EVAL MOD COMPLEX 45 MIN: CPT

## 2023-09-27 RX ORDER — ASPIRIN 81 MG/1
81 TABLET ORAL
Qty: 60 TABLET | Refills: 0 | Status: SHIPPED | OUTPATIENT
Start: 2023-09-27 | End: 2023-10-27

## 2023-09-27 RX ORDER — OXYCODONE HYDROCHLORIDE 10 MG/1
10 TABLET ORAL EVERY 4 HOURS PRN
Status: DISCONTINUED | OUTPATIENT
Start: 2023-09-27 | End: 2023-09-29 | Stop reason: HOSPADM

## 2023-09-27 RX ORDER — IBUPROFEN 600 MG/1
600 TABLET ORAL EVERY 8 HOURS PRN
Qty: 120 TABLET | Refills: 3
Start: 2023-09-27

## 2023-09-27 RX ORDER — OXYCODONE HYDROCHLORIDE 5 MG/1
5-10 TABLET ORAL EVERY 6 HOURS PRN
Qty: 40 TABLET | Refills: 0 | Status: SHIPPED | OUTPATIENT
Start: 2023-09-27 | End: 2023-10-02

## 2023-09-27 RX ORDER — OXYCODONE HYDROCHLORIDE 10 MG/1
10 TABLET ORAL EVERY 4 HOURS PRN
Status: DISCONTINUED | OUTPATIENT
Start: 2023-09-27 | End: 2023-09-27

## 2023-09-27 RX ORDER — OXYCODONE HYDROCHLORIDE 5 MG/1
5 TABLET ORAL EVERY 4 HOURS PRN
Status: DISCONTINUED | OUTPATIENT
Start: 2023-09-27 | End: 2023-09-29 | Stop reason: HOSPADM

## 2023-09-27 RX ADMIN — CEFAZOLIN 2000 MG: 2 INJECTION, POWDER, FOR SOLUTION INTRAMUSCULAR; INTRAVENOUS at 15:48

## 2023-09-27 RX ADMIN — HYDROMORPHONE HYDROCHLORIDE 0.5 MG: 1 INJECTION, SOLUTION INTRAMUSCULAR; INTRAVENOUS; SUBCUTANEOUS at 06:51

## 2023-09-27 RX ADMIN — OXYCODONE HYDROCHLORIDE 5 MG: 5 TABLET ORAL at 05:18

## 2023-09-27 RX ADMIN — OXYCODONE HYDROCHLORIDE 10 MG: 10 TABLET ORAL at 20:34

## 2023-09-27 RX ADMIN — OXYCODONE HYDROCHLORIDE 10 MG: 10 TABLET ORAL at 14:49

## 2023-09-27 RX ADMIN — OXYCODONE HYDROCHLORIDE 5 MG: 5 TABLET ORAL at 10:01

## 2023-09-27 RX ADMIN — SODIUM CHLORIDE, PRESERVATIVE FREE 10 ML: 5 INJECTION INTRAVENOUS at 20:35

## 2023-09-27 RX ADMIN — FLUTICASONE PROPIONATE 1 SPRAY: 50 SPRAY, METERED NASAL at 20:34

## 2023-09-27 RX ADMIN — CEFAZOLIN 2000 MG: 2 INJECTION, POWDER, FOR SOLUTION INTRAMUSCULAR; INTRAVENOUS at 06:02

## 2023-09-27 ASSESSMENT — PAIN SCALES - GENERAL
PAINLEVEL_OUTOF10: 10
PAINLEVEL_OUTOF10: 4
PAINLEVEL_OUTOF10: 9
PAINLEVEL_OUTOF10: 10
PAINLEVEL_OUTOF10: 6
PAINLEVEL_OUTOF10: 8

## 2023-09-27 ASSESSMENT — PAIN DESCRIPTION - FREQUENCY
FREQUENCY: CONTINUOUS
FREQUENCY: CONTINUOUS

## 2023-09-27 ASSESSMENT — PAIN DESCRIPTION - DESCRIPTORS
DESCRIPTORS: ACHING
DESCRIPTORS: ACHING
DESCRIPTORS: ACHING;SHARP
DESCRIPTORS: TIGHTNESS;STABBING

## 2023-09-27 ASSESSMENT — PAIN DESCRIPTION - ORIENTATION
ORIENTATION: RIGHT
ORIENTATION: RIGHT

## 2023-09-27 ASSESSMENT — PAIN DESCRIPTION - ONSET
ONSET: AWAKENED FROM SLEEP
ONSET: ON-GOING

## 2023-09-27 ASSESSMENT — PAIN DESCRIPTION - LOCATION
LOCATION: HIP

## 2023-09-27 ASSESSMENT — PAIN DESCRIPTION - PAIN TYPE
TYPE: SURGICAL PAIN;ACUTE PAIN
TYPE: SURGICAL PAIN

## 2023-09-27 NOTE — PROGRESS NOTES
RN attempted to remove pt hinson as it was for procedure, pt refused until therapy worked with her. Pt educated on risk of infection, still refused to allow staff to remove    [phone]: Pt worked with therapy, RN informed pt of needing to remove hinson once she returns back to bed, t stated that thee was no way she could go to and from the bathroom despite therapy working with her, pt educated on alternative options and needing to remove the hinson today. 2905: Hinson removed at this time, pt tolerated well with no complications noted.  Pt aware to call to void, bed alarm on, siderails 3/4, call light within reach, fall precautions in place, denies any other concerns at this time

## 2023-09-27 NOTE — PLAN OF CARE
Problem: Discharge Planning  Goal: Discharge to home or other facility with appropriate resources  Outcome: Progressing  Flowsheets (Taken 9/26/2023 2200)  Discharge to home or other facility with appropriate resources: Identify barriers to discharge with patient and caregiver     Problem: Pain  Goal: Verbalizes/displays adequate comfort level or baseline comfort level  Outcome: Progressing     Problem: Skin/Tissue Integrity  Goal: Absence of new skin breakdown  Description: 1. Monitor for areas of redness and/or skin breakdown  2. Assess vascular access sites hourly  3. Every 4-6 hours minimum:  Change oxygen saturation probe site  4. Every 4-6 hours:  If on nasal continuous positive airway pressure, respiratory therapy assess nares and determine need for appliance change or resting period.   Outcome: Progressing     Problem: Safety - Adult  Goal: Free from fall injury  Outcome: Progressing  Flowsheets (Taken 9/27/2023 0115)  Free From Fall Injury: Instruct family/caregiver on patient safety     Problem: ABCDS Injury Assessment  Goal: Absence of physical injury  Outcome: Progressing  Flowsheets (Taken 9/27/2023 0115)  Absence of Physical Injury: Implement safety measures based on patient assessment     Problem: Chronic Conditions and Co-morbidities  Goal: Patient's chronic conditions and co-morbidity symptoms are monitored and maintained or improved  Outcome: Progressing  Flowsheets (Taken 9/26/2023 2200)  Care Plan - Patient's Chronic Conditions and Co-Morbidity Symptoms are Monitored and Maintained or Improved: Monitor and assess patient's chronic conditions and comorbid symptoms for stability, deterioration, or improvement

## 2023-09-27 NOTE — PROGRESS NOTES
Occupational Therapy  Facility/Department: [address] ORTHOPEDICS  Occupational Therapy Initial Assessment    Name: Aida Gifford  : 1970  MRN: [id number]  Date of Service: 2023    Discharge Recommendations:  5-7 sessions per week, Patient would benefit from continued therapy after discharge, Continue to assess pending progress     Aida Gifford scored a 16/24 on the AM-PAC ADL Inpatient form. Current research shows that an AM-PAC score of 17 or less is typically not associated with a discharge to the patient's home setting. Based on the patient's AM-PAC score and their current ADL deficits, it is recommended that the patient have 5-7 sessions per week of Occupational Therapy at d/c to increase the patient's independence. At this time, this patient demonstrates complex nursing, medical, and rehabilitative needs, and would benefit from intensive rehabilitation services upon discharge from the Inpatient setting. This patient demonstrates the ability to participate in and benefit from an intensive therapy program with a coordinated interdisciplinary team approach to foster frequent, structured, and documented communication among disciplines, who will work together to establish, prioritize, and achieve treatment goals. Please see assessment section for further patient specific details. If patient discharges prior to next session this note will serve as a discharge summary. Please see below for the latest assessment towards goals. Patient Diagnosis(es): The encounter diagnosis was Closed fracture of neck of right femur, initial encounter ([address]).   Past Medical History:  has a past medical history of Anxiety and depression, Arthritis, Asthma, Chronic pain syndrome, Complication of joint prosthesis ([address]), COPD (chronic obstructive pulmonary disease) ([address]), Failed back surgical syndrome, Failed neck syndrome, Fibromyalgia, GERD (gastroesophageal reflux disease), Influenza A, MDRO (multiple drug

## 2023-09-27 NOTE — PROGRESS NOTES
Non-linked order noted for oxycodone 5mg and 10mg. This RN spoke w/ NP Seth Rae to see if patient is able to have 10mg dose as the eMAR indicated the 10mg oxycodone should be pulled from the pre/post [facility] and not the [address] [place] [address] [place] (this RN wanted to verify this order was indicated for floor use and not just pre/post use). New order placed per direction from JEZ Watkins for 5mg or 10mg oxycodone Q4H PRN. Update provided to patients primary CRISTINA Callahan.

## 2023-09-27 NOTE — PROGRESS NOTES
Physical Therapy    Facility/Department: [address] ORTHOPEDICS    Physical Therapy Initial Assessment      Name: Luis Can    : 1970    MRN: [id number]    Date of Service: 2023    Assessment / Discharge Recommendations:    -right hip fracture >>>>THR   wbat  anterior/posterior precautions (will verify positional precautions with Ortho NP)    -anticipate will need continued PTOT and nursing care in a skilled setting -- able to tolerate a High frequency of sessions     Luis Can scored a 10/24 on the AM-PAC short mobility form. Current research shows that an AM-PAC score of 17 or less is typically not associated with a discharge to the patient's home setting. Based on the patient's AM-PAC score and their current functional mobility deficits, it is recommended that the patient have 5-7 sessions per week of Physical Therapy at d/c to increase the patient's independence. Patient Diagnosis(es): The encounter diagnosis was Closed fracture of neck of right femur, initial encounter ([address]). Past Medical History:  has a past medical history of Anxiety and depression, Arthritis, Asthma, Chronic pain syndrome, Complication of joint prosthesis ([address]), COPD (chronic obstructive pulmonary disease) ([address]), Failed back surgical syndrome, Failed neck syndrome, Fibromyalgia, GERD (gastroesophageal reflux disease), Influenza A, MDRO (multiple drug resistant organisms) resistance, Obesity (BMI 30-39.9), and Pneumonia. Past Surgical History:  has a past surgical history that includes  section; hernia repair; Spine surgery; Knee Arthrocentesis; Upper gastrointestinal endoscopy (2015); joint replacement; Abdomen surgery; back surgery; Endoscopy, colon, diagnostic; and Total hip arthroplasty (Right, 2023). Body Structures, Functions, Activity Limitations Requiring Skilled Therapeutic Intervention: Decreased functional mobility ; Increased pain;Decreased ADL status; Decreased balance  Therapy Prognosis: Good  Decision Making: Medium Complexity  Requires PT Follow-Up: Yes  Activity Tolerance  Activity Tolerance: Patient tolerated treatment well;Patient limited by pain     Plan   Physcial Therapy Plan  Current Treatment Recommendations: Functional mobility training, Transfer training, ADL/Self-care training, Gait training, Positioning, Modalities, Therapeutic activities, Patient/Caregiver education & training  Additional Comments: qd x1-2 then 3-5 while on acute floor  Safety Devices  Type of Devices:  All fall risk precautions in place, Call light within reach, Chair alarm in place, Nurse notified, Left in chair     Restrictions  Restrictions/Precautions  Restrictions/Precautions: Weight Bearing  Lower Extremity Weight Bearing Restrictions  Right Lower Extremity Weight Bearing: Weight Bearing As Tolerated  Position Activity Restriction  Hip Precautions: Anterior hip precautions, Posterior hip precautions  Other position/activity restrictions: anterior/posterior precautions; hinson catheter     Subjective   General  Chart Reviewed: Yes  Patient assessed for rehabilitation services?: Yes  Additional Pertinent Hx: here due to fall at home - slipped on a swiffer duster- yielding right hip fracture   - at baseline is fully independent  Response To Previous Treatment: Not applicable  Family / Caregiver Present: No  Follows Commands: Within Functional Limits  Subjective  Subjective: to room along with OT to patient resting in bed - nursing providing pain medications  - at initial introductions she is not receptive to PTOT session but with some encouragment is agreeable to attempt to mobilize from bed         Social/Functional History  Social/Functional History  Lives With: Daughter, Other (comment) (roommates)  Type of Home: Apartment  Home Layout: One level  Home Access: Stairs to enter with rails  Entrance Stairs - Number of Steps: 5 ABBY  Entrance Stairs - Rails: Both  Bathroom Shower/Tub:

## 2023-09-27 NOTE — PROGRESS NOTES
Pt given Prn medications for pain multiple times with desirable results r/t to recent hip surgery, pt  educated on importance of non pharm interventions.

## 2023-09-27 NOTE — PROGRESS NOTES
Patient was awake, oriented x4, she was given her oxycodone by the nurse. PT came in to get her up in the chair and she began to cry and stated she needs percocet before she can get up. Patient got up and into the chair with the assistance of PT.  Electronically signed by Paty Mckeon RN on 9/27/2023 at 3:56 PM

## 2023-09-27 NOTE — PROGRESS NOTES
ABDOMEN:  Normal active bowel sounds, soft, non-tender, non-distended, no masses palpated, no organomegally  EXTREMITIES. Right hip TTP, otherwise extremities atraumatic, no cyanosis or edema and Homans sign is negative, no sign of DVT. MENTAL STATUS: Awake, alert, oriented to name, place and time. NEUROLOGIC:  Cranial nerves II-XII are grossly intact. SKIN:  normal skin color, texture, turgor for age.     Data    CBC with Differential:    Lab Results   Component Value Date/Time    WBC 5.3 09/26/2023 04:40 AM    HGB 12.8 09/26/2023 04:40 AM    HCT 36.7 09/26/2023 04:40 AM     09/26/2023 04:40 AM    MCV 81.7 09/26/2023 04:40 AM    RDW 13.3 09/26/2023 04:40 AM    SEGSPCT 70.9 02/06/2014 11:09 AM    BANDSPCT 1 05/29/2016 05:34 AM    LYMPHOPCT 19.5 09/26/2023 04:40 AM    MONOPCT 8.4 09/26/2023 04:40 AM    MYELOPCT 1 05/29/2016 05:34 AM    EOSPCT 0.5 08/22/2011 04:45 PM    BASOPCT 0.9 09/26/2023 04:40 AM    MONOSABS 0.4 09/26/2023 04:40 AM    LYMPHSABS 1.0 09/26/2023 04:40 AM    EOSABS 0.5 09/26/2023 04:40 AM    BASOSABS 0.0 09/26/2023 04:40 AM    DIFFTYPE Auto-K 03/29/2013 05:00 AM     BMP:    Lab Results   Component Value Date/Time     09/26/2023 04:40 AM    K 4.0 09/26/2023 04:40 AM     09/26/2023 04:40 AM    CO2 25 09/26/2023 04:40 AM    BUN 23 09/26/2023 04:40 AM    CREATININE 0.6 09/26/2023 04:40 AM    GLUCOSE 110 09/26/2023 04:40 AM    CALCIUM 8.0 09/26/2023 04:40 AM    GFRAA >60 01/19/2022 05:35 AM    GFRAA >60 03/29/2013 05:00 AM    LABGLOM >60 09/26/2023 04:40 AM     LFT:   Lab Results   Component Value Date/Time    ALKPHOS 95 01/18/2022 08:51 PM    ALT 16 01/18/2022 08:51 PM    AST 18 01/18/2022 08:51 PM    PROT 6.9 01/18/2022 08:51 PM    PROT 6.3 10/24/2012 06:45 AM    BILITOT <0.2 01/18/2022 08:51 PM    BILIDIR 0.21 12/20/2009 03:30 PM    IBILI 0.3 12/20/2009 03:30 PM     Magnesium:    Lab Results   Component Value Date/Time    MG 1.70 05/02/2018 05:08 AM     Phosphorus:    Lab contributing to the patient's presentation. DVT Prophylaxis: SCDs  Diet: ADULT DIET;  Regular  Code Status: Full Code    PT/OT Eval Status: Not yet ordered      Dispo - Anticipated discharge date ~ 1-2 days    Piyush Leonard MD

## 2023-09-28 ENCOUNTER — APPOINTMENT (OUTPATIENT)
Dept: GENERAL RADIOLOGY | Age: 53
End: 2023-09-28
Payer: MEDICARE

## 2023-09-28 LAB
ANION GAP SERPL CALCULATED.3IONS-SCNC: 9 MMOL/L (ref 3–16)
BACTERIA URNS QL MICRO: ABNORMAL /HPF
BASOPHILS # BLD: 0.1 K/UL (ref 0–0.2)
BASOPHILS NFR BLD: 1 %
BILIRUB UR QL STRIP.AUTO: NEGATIVE
BUN SERPL-MCNC: 13 MG/DL (ref 7–20)
CALCIUM SERPL-MCNC: 8.2 MG/DL (ref 8.3–10.6)
CHARACTER UR: ABNORMAL
CHLORIDE SERPL-SCNC: 102 MMOL/L (ref 99–110)
CLARITY UR: CLEAR
CO2 SERPL-SCNC: 23 MMOL/L (ref 21–32)
COLOR UR: YELLOW
CREAT SERPL-MCNC: 0.6 MG/DL (ref 0.6–1.1)
DEPRECATED RDW RBC AUTO: 13.1 % (ref 12.4–15.4)
EOSINOPHIL # BLD: 0.3 K/UL (ref 0–0.6)
EOSINOPHIL NFR BLD: 3.7 %
EPI CELLS #/AREA URNS AUTO: 5 /HPF (ref 0–5)
GFR SERPLBLD CREATININE-BSD FMLA CKD-EPI: >60 ML/MIN/{1.73_M2}
GLUCOSE SERPL-MCNC: 103 MG/DL (ref 70–99)
GLUCOSE UR STRIP.AUTO-MCNC: NEGATIVE MG/DL
HCT VFR BLD AUTO: 32.9 % (ref 36–48)
HGB BLD-MCNC: 11.7 G/DL (ref 12–16)
HGB UR QL STRIP.AUTO: NEGATIVE
HYALINE CASTS #/AREA URNS AUTO: 2 /LPF (ref 0–8)
KETONES UR STRIP.AUTO-MCNC: NEGATIVE MG/DL
LEUKOCYTE ESTERASE UR QL STRIP.AUTO: ABNORMAL
LYMPHOCYTES # BLD: 1.7 K/UL (ref 1–5.1)
LYMPHOCYTES NFR BLD: 18.9 %
MAGNESIUM SERPL-MCNC: 1.9 MG/DL (ref 1.8–2.4)
MCH RBC QN AUTO: 28.8 PG (ref 26–34)
MCHC RBC AUTO-ENTMCNC: 35.4 G/DL (ref 31–36)
MCV RBC AUTO: 81.2 FL (ref 80–100)
MONOCYTES # BLD: 0.8 K/UL (ref 0–1.3)
MONOCYTES NFR BLD: 9.1 %
NEUTROPHILS # BLD: 6.1 K/UL (ref 1.7–7.7)
NEUTROPHILS NFR BLD: 67.3 %
NITRITE UR QL STRIP.AUTO: NEGATIVE
PH UR STRIP.AUTO: 6 [PH] (ref 5–8)
PLATELET # BLD AUTO: 262 K/UL (ref 135–450)
PMV BLD AUTO: 7.5 FL (ref 5–10.5)
POTASSIUM SERPL-SCNC: 3.4 MMOL/L (ref 3.5–5.1)
PROT UR STRIP.AUTO-MCNC: ABNORMAL MG/DL
RBC # BLD AUTO: 4.05 M/UL (ref 4–5.2)
RBC CLUMPS #/AREA URNS AUTO: 0 /HPF (ref 0–4)
RENAL EPI CELLS #/AREA UR COMP ASSIST: ABNORMAL /HPF (ref 0–1)
SODIUM SERPL-SCNC: 134 MMOL/L (ref 136–145)
SP GR UR STRIP.AUTO: 1.02 (ref 1–1.03)
UA COMPLETE W REFLEX CULTURE PNL UR: YES
UA DIPSTICK W REFLEX MICRO PNL UR: YES
URN SPEC COLLECT METH UR: ABNORMAL
UROBILINOGEN UR STRIP-ACNC: 1 E.U./DL
WBC # BLD AUTO: 9 K/UL (ref 4–11)
WBC #/AREA URNS AUTO: 36 /HPF (ref 0–5)

## 2023-09-28 PROCEDURE — 80048 BASIC METABOLIC PNL TOTAL CA: CPT

## 2023-09-28 PROCEDURE — 85025 COMPLETE CBC W/AUTO DIFF WBC: CPT

## 2023-09-28 PROCEDURE — 1200000000 HC SEMI PRIVATE

## 2023-09-28 PROCEDURE — 97530 THERAPEUTIC ACTIVITIES: CPT

## 2023-09-28 PROCEDURE — 87040 BLOOD CULTURE FOR BACTERIA: CPT

## 2023-09-28 PROCEDURE — 6370000000 HC RX 637 (ALT 250 FOR IP): Performed by: NURSE PRACTITIONER

## 2023-09-28 PROCEDURE — 2580000003 HC RX 258: Performed by: INTERNAL MEDICINE

## 2023-09-28 PROCEDURE — 83735 ASSAY OF MAGNESIUM: CPT

## 2023-09-28 PROCEDURE — 71045 X-RAY EXAM CHEST 1 VIEW: CPT

## 2023-09-28 PROCEDURE — 6360000002 HC RX W HCPCS: Performed by: INTERNAL MEDICINE

## 2023-09-28 PROCEDURE — 6370000000 HC RX 637 (ALT 250 FOR IP): Performed by: INTERNAL MEDICINE

## 2023-09-28 PROCEDURE — 81001 URINALYSIS AUTO W/SCOPE: CPT

## 2023-09-28 PROCEDURE — 97116 GAIT TRAINING THERAPY: CPT

## 2023-09-28 PROCEDURE — 97535 SELF CARE MNGMENT TRAINING: CPT

## 2023-09-28 PROCEDURE — 87086 URINE CULTURE/COLONY COUNT: CPT

## 2023-09-28 PROCEDURE — 94760 N-INVAS EAR/PLS OXIMETRY 1: CPT

## 2023-09-28 RX ADMIN — ACETAMINOPHEN 650 MG: 325 TABLET ORAL at 08:25

## 2023-09-28 RX ADMIN — OXYCODONE HYDROCHLORIDE 10 MG: 10 TABLET ORAL at 18:49

## 2023-09-28 RX ADMIN — FLUTICASONE PROPIONATE 1 SPRAY: 50 SPRAY, METERED NASAL at 21:30

## 2023-09-28 RX ADMIN — OXYCODONE HYDROCHLORIDE 10 MG: 10 TABLET ORAL at 06:03

## 2023-09-28 RX ADMIN — OXYCODONE HYDROCHLORIDE 10 MG: 10 TABLET ORAL at 00:34

## 2023-09-28 RX ADMIN — OXYCODONE HYDROCHLORIDE 10 MG: 10 TABLET ORAL at 11:08

## 2023-09-28 RX ADMIN — OXYCODONE HYDROCHLORIDE 10 MG: 10 TABLET ORAL at 23:06

## 2023-09-28 RX ADMIN — SODIUM CHLORIDE, PRESERVATIVE FREE 10 ML: 5 INJECTION INTRAVENOUS at 08:25

## 2023-09-28 RX ADMIN — POLYETHYLENE GLYCOL 3350 17 G: 17 POWDER, FOR SOLUTION ORAL at 21:30

## 2023-09-28 ASSESSMENT — PAIN DESCRIPTION - ORIENTATION
ORIENTATION: RIGHT

## 2023-09-28 ASSESSMENT — PAIN DESCRIPTION - ONSET
ONSET: ON-GOING

## 2023-09-28 ASSESSMENT — PAIN DESCRIPTION - LOCATION
LOCATION: HIP
LOCATION: HIP
LOCATION: HIP;LEG
LOCATION: HIP

## 2023-09-28 ASSESSMENT — PAIN SCALES - GENERAL
PAINLEVEL_OUTOF10: 4
PAINLEVEL_OUTOF10: 7
PAINLEVEL_OUTOF10: 6
PAINLEVEL_OUTOF10: 8

## 2023-09-28 ASSESSMENT — PAIN DESCRIPTION - DESCRIPTORS
DESCRIPTORS: ACHING;BURNING
DESCRIPTORS: ACHING;DISCOMFORT
DESCRIPTORS: SHARP
DESCRIPTORS: ACHING

## 2023-09-28 ASSESSMENT — PAIN - FUNCTIONAL ASSESSMENT
PAIN_FUNCTIONAL_ASSESSMENT: ACTIVITIES ARE NOT PREVENTED
PAIN_FUNCTIONAL_ASSESSMENT: PREVENTS OR INTERFERES SOME ACTIVE ACTIVITIES AND ADLS

## 2023-09-28 ASSESSMENT — PAIN DESCRIPTION - PAIN TYPE
TYPE: SURGICAL PAIN

## 2023-09-28 ASSESSMENT — PAIN DESCRIPTION - FREQUENCY
FREQUENCY: CONTINUOUS
FREQUENCY: INTERMITTENT

## 2023-09-28 ASSESSMENT — PAIN SCALES - WONG BAKER: WONGBAKER_NUMERICALRESPONSE: 0

## 2023-09-28 NOTE — CARE COORDINATION
09/28/23 1001   IMM Letter   IMM Letter given to Patient/Family/Significant other/Guardian/POA/by: María Jones   IMM Letter date given: 09/28/23   IMM Letter time given: 1002

## 2023-09-28 NOTE — PROGRESS NOTES
Patient is alert & oriented x4, resting in bed, on room air. Gets up with stedy x1. R chest port normal saline locked, flushed easily with blood return. 2/4 bed rails up, bed in lowest position, fall precautions in place, bed alarm on, call light within reach. Morning medications given as ordered. Patient refused lovenox this morning. Denies further needs at this time. Ice applied to R hip. SCDs in place and on. Will cont to monitor and reassess.   Electronically signed by Carola Tavarez RN on 9/28/2023 at 12:36 PM

## 2023-09-28 NOTE — PROGRESS NOTES
This RN along with Kanwal Hassan RN (charge) attempted to draw labs from patient's port. Port initially had blood return but was occluded and could not draw blood for labs. Port was deaccessed and attempted to reaccess unsuccessfully. Lab was called to draw labs.  Perfect serve message sent to Riley Felix MD.   Electronically signed by Cecily Wallis RN on 9/28/2023 at 12:54 PM

## 2023-09-28 NOTE — CARE COORDINATION
Noted therapy recommendations for continued rehab at d/c. Met with patient and reviewed [employer] list. She prefers [facility] as it is closest to her residential area. Referred via [facility]. Patient accepted for admit. Precert obtained. MD anticipates d/c tomorrow. Patient aware and in agreement with plan.    Electronically signed by ALTHEA Gonsales on 9/28/2023 at 3:04 PM

## 2023-09-28 NOTE — PROGRESS NOTES
unit  Bathroom Toilet: Standard  Home Equipment:  (cane, nebulizer, inhaler)  Has the patient had two or more falls in the past year or any fall with injury in the past year?: Yes (this admission)  ADL Assistance: [id number] Independent  Homemaking Responsibilities: Yes  Ambulation Assistance: Independent  Transfer Assistance: Independent  Active : Yes  Mode of Transportation: Car  Occupation: On disability  Vision/Hearing  Vision  Vision: Within Functional Limits  Hearing  Hearing: Within functional limits        Objective   Observation/Palpation  Observation: Dressing intact R hip.  BLE rekha hose placed      Bed mobility  Supine to Sit: Minimal assistance  Sit to Supine: Unable to assess  Scooting: Minimal assistance  Transfers  Sit to Stand: Contact guard assistance (up to walker from bed)  Stand to Sit: Contact guard assistance  Ambulation  Surface: Level tile  Device: Rolling Walker  Assistance: Contact guard assistance  Quality of Gait: step to pattern leading with right LE   - cues to get her heel down and bear some weight  Distance: bed to recliner for ~5 feet  More Ambulation?: No  Stairs/Curb  Stairs?: No     Balance  Comments: steady with transfers and when up to walker with cga for safety    AM-PAC Score  AM-PAC Inpatient Mobility Raw Score : 14 (09/28/23 1120)  AM-PAC Inpatient T-Scale Score : 38.1 (09/28/23 1120)  Mobility Inpatient CMS 0-100% Score: 61.29 (09/28/23 1120)  Mobility Inpatient CMS G-Code Modifier : CL (09/28/23 1120)    Goals  Short Term Goals  Time Frame for Short Term Goals: 1- 2 days  Short Term Goal 1: bed mobility at min/mod assist  Short Term Goal 2: transfers at min assist  Short Term Goal 3: ambulation at min assist wbat rolling walker for room distances to 25 feet  Short Term Goal 4: exercises at min assist  Short Term Goal 5: verbalize and demonstrate anterior/posterior precautions  Patient Goals   Patient Goals : pain relief Education  Patient Education  Education Given To: Patient  Education Provided: Transfer Training  Education Method: Verbal  Barriers to Learning: None  Education Outcome: Demonstrated understanding      Therapy Time   Individual Concurrent Group Co-treatment   Time In [facility]         Time Out 1120         Minutes [address], PT

## 2023-09-28 NOTE — CARE COORDINATION
[facility] authorization received via portal:    Payor approval ID:      [place] Approval ID:  [id number]    Service - Location:  [address] [place] [address]    Dates Approved:  09/29/2023-10/03/2023    NRD:  10/3/23    Nannette Gutiérrez Sr.  Administrative Assist, Case Management  [phone]  Electronically signed by Nannette Gutiérrez on 9/28/2023 at 11:51 AM

## 2023-09-28 NOTE — PROGRESS NOTES
procurement, Safety education & training, Self-Care / ADL, Home management training     Restrictions  Restrictions/Precautions  Restrictions/Precautions: Weight Bearing  Lower Extremity Weight Bearing Restrictions  Right Lower Extremity Weight Bearing: Weight Bearing As Tolerated  Position Activity Restriction  Hip Precautions: Anterior hip precautions, Posterior hip precautions  Other position/activity restrictions: Orthopedic NP reports NO hip precautions    Subjective   General  Chart Reviewed: Yes  Patient assessed for rehabilitation services?: Yes  Additional Pertinent Hx: 51 yo female admitted for a fall with R femoral neck fx s/p 9/26 R ANT now WBAT with no positional hip precautions. PMH: HTN, COPD, CHF  Family / Caregiver Present: No  Referring Practitioner: HA Cueto CNP  Diagnosis: R hip fx; R ANT  Subjective  Subjective: Pt resting in bed upon arrival, awakes to voice. Pt reporting high L hip pain- does not rate, RN notified  General Comment  Comments: RN ok to see       Social/Functional History  Social/Functional History  Lives With: Daughter, Other (comment) (roommates)  Type of Home: Apartment  Home Layout: One level  Home Access: Stairs to enter with rails  Entrance Stairs - Number of Steps: 5 ABBY  Entrance Stairs - Rails: Both  Bathroom Shower/Tub: Tub/Shower unit  Bathroom Toilet: Standard  Home Equipment:  (cane, nebulizer, inhaler)  Has the patient had two or more falls in the past year or any fall with injury in the past year?: Yes (this admission)  ADL Assistance: [id number] Independent  Homemaking Responsibilities: Yes  Ambulation Assistance: Independent  Transfer Assistance: Independent  Active : Yes  Mode of Transportation: Car  Occupation: On disability       Objective           Observation/Palpation  Observation: Dressing intact R hip. BLE rekha hose placed  Safety Devices  Type of Devices:  All fall risk precautions in place;Call light routine  Short Term Goal 2: Shaye LB dressing/bathing with AE  Short Term Goal 3: Shaye toileting  Short Term Goal 4: CGA standing x5 minutes to complete ADL task  Short Term Goal 5: Pt will complete B UE exercises in all planes in prep to complete ADL task  Long Term Goals  Time Frame for Long Term Goals : STGs=LTGs  Patient Goals   Patient goals :  To return home       Therapy Time   Individual Concurrent Group Co-treatment   Time In 1055         Time Out 1135         Minutes 40         Timed Code Treatment Minutes: 40 Minutes (25 TA 15 ADL)       DANDRE Rios, OTR/L

## 2023-09-28 NOTE — PROGRESS NOTES
Patient assisted to bathroom x1 with Stedy, urinated without difficulties. Pt rates pain 10/10 in right hip, PRN Oxy given for pain. Bed in locked and lowest position with alarm in place. Call light and side table within reach. Evening meds given. Pt A/Ox4. VSS. Denies any current needs at this time.     Anthony Odonnell RN

## 2023-09-28 NOTE — PLAN OF CARE
Problem: Discharge Planning  Goal: Discharge to home or other facility with appropriate resources  9/28/2023 0748 by Brando Reardon RN  Outcome: Progressing  Flowsheets (Taken 9/28/2023 4344)  Discharge to home or other facility with appropriate resources:   Identify barriers to discharge with patient and caregiver   Identify discharge learning needs (meds, wound care, etc)   Refer to discharge planning if patient needs post-hospital services based on physician order or complex needs related to functional status, cognitive ability or social support system   Arrange for needed discharge resources and transportation as appropriate     Problem: Pain  Goal: Verbalizes/displays adequate comfort level or baseline comfort level  9/28/2023 0748 by Brando Reardon RN  Outcome: Progressing  Flowsheets (Taken 9/25/2023 2145 by Karthik Akins RN)  Verbalizes/displays adequate comfort level or baseline comfort level:   Encourage patient to monitor pain and request assistance   Assess pain using appropriate pain scale   Administer analgesics based on type and severity of pain and evaluate response   Implement non-pharmacological measures as appropriate and evaluate response     Problem: Skin/Tissue Integrity  Goal: Absence of new skin breakdown  Description: 1. Monitor for areas of redness and/or skin breakdown  2. Assess vascular access sites hourly  3. Every 4-6 hours minimum:  Change oxygen saturation probe site  4. Every 4-6 hours:  If on nasal continuous positive airway pressure, respiratory therapy assess nares and determine need for appliance change or resting period.   9/28/2023 0748 by Brando Reardon RN  Outcome: Progressing     Problem: Safety - Adult  Goal: Free from fall injury  9/28/2023 0748 by Brando Reardon RN  Outcome: Progressing     Problem: ABCDS Injury Assessment  Goal: Absence of physical injury  9/28/2023 0748 by Brando Reardon RN  Outcome: Progressing     Problem: Chronic Conditions and Co-morbidities  Goal: Patient's chronic conditions and co-morbidity symptoms are monitored and maintained or improved  9/28/2023 0748 by Jacqeus Akers RN  Outcome: Progressing

## 2023-09-28 NOTE — PROGRESS NOTES
Staff workingon access to pts right chest port. I interviewed the pt from a distance in the room. She reports muscle pain in right thigh, walking felt   Pretty good\". Pain improved after dose change yesterday  Right foot wiggles well to command, she denies numbness right leg  Plan DC to ECF when stable per hospitalist  Followup ortho in office 2 weeks postop. VSS. Labs stable.

## 2023-09-28 NOTE — PLAN OF CARE
Problem: Discharge Planning  Goal: Discharge to home or other facility with appropriate resources  Outcome: Progressing  Flowsheets (Taken 9/27/2023 2034)  Discharge to home or other facility with appropriate resources: Identify barriers to discharge with patient and caregiver     Problem: Pain  Goal: Verbalizes/displays adequate comfort level or baseline comfort level  Outcome: Progressing  Flowsheets (Taken 9/25/2023 2145 by Rex Lee RN)  Verbalizes/displays adequate comfort level or baseline comfort level:   Encourage patient to monitor pain and request assistance   Assess pain using appropriate pain scale   Administer analgesics based on type and severity of pain and evaluate response   Implement non-pharmacological measures as appropriate and evaluate response     Problem: Skin/Tissue Integrity  Goal: Absence of new skin breakdown  Description: 1. Monitor for areas of redness and/or skin breakdown  2. Assess vascular access sites hourly  3. Every 4-6 hours minimum:  Change oxygen saturation probe site  4. Every 4-6 hours:  If on nasal continuous positive airway pressure, respiratory therapy assess nares and determine need for appliance change or resting period.   Outcome: Progressing     Problem: Safety - Adult  Goal: Free from fall injury  Outcome: Progressing  Flowsheets (Taken 9/27/2023 2045)  Free From Fall Injury: Instruct family/caregiver on patient safety     Problem: ABCDS Injury Assessment  Goal: Absence of physical injury  Outcome: Progressing  Flowsheets (Taken 9/27/2023 2045)  Absence of Physical Injury: Implement safety measures based on patient assessment     Problem: Chronic Conditions and Co-morbidities  Goal: Patient's chronic conditions and co-morbidity symptoms are monitored and maintained or improved  Outcome: Progressing  Flowsheets (Taken 9/26/2023 2200 by Stefani Montero RN)  Care Plan - Patient's Chronic Conditions and Co-Morbidity Symptoms are Monitored and Maintained or Improved: Monitor and assess patient's chronic conditions and comorbid symptoms for stability, deterioration, or improvement  Note: 15 mins education complete

## 2023-09-29 VITALS
SYSTOLIC BLOOD PRESSURE: 128 MMHG | DIASTOLIC BLOOD PRESSURE: 73 MMHG | HEIGHT: 60 IN | OXYGEN SATURATION: 97 % | TEMPERATURE: 98.1 F | HEART RATE: 78 BPM | RESPIRATION RATE: 16 BRPM | WEIGHT: 163.8 LBS | BODY MASS INDEX: 32.16 KG/M2

## 2023-09-29 LAB
ANION GAP SERPL CALCULATED.3IONS-SCNC: 7 MMOL/L (ref 3–16)
BACTERIA BLD CULT: NORMAL
BACTERIA UR CULT: NORMAL
BASOPHILS # BLD: 0.1 K/UL (ref 0–0.2)
BASOPHILS NFR BLD: 0.8 %
BUN SERPL-MCNC: 16 MG/DL (ref 7–20)
CALCIUM SERPL-MCNC: 8.5 MG/DL (ref 8.3–10.6)
CHLORIDE SERPL-SCNC: 100 MMOL/L (ref 99–110)
CO2 SERPL-SCNC: 27 MMOL/L (ref 21–32)
CREAT SERPL-MCNC: 0.5 MG/DL (ref 0.6–1.1)
DEPRECATED RDW RBC AUTO: 13.1 % (ref 12.4–15.4)
EOSINOPHIL # BLD: 0.5 K/UL (ref 0–0.6)
EOSINOPHIL NFR BLD: 7.6 %
GFR SERPLBLD CREATININE-BSD FMLA CKD-EPI: >60 ML/MIN/{1.73_M2}
GLUCOSE SERPL-MCNC: 126 MG/DL (ref 70–99)
HCT VFR BLD AUTO: 28.8 % (ref 36–48)
HGB BLD-MCNC: 10 G/DL (ref 12–16)
LYMPHOCYTES # BLD: 1.5 K/UL (ref 1–5.1)
LYMPHOCYTES NFR BLD: 23 %
MCH RBC QN AUTO: 28.5 PG (ref 26–34)
MCHC RBC AUTO-ENTMCNC: 34.7 G/DL (ref 31–36)
MCV RBC AUTO: 82.1 FL (ref 80–100)
MONOCYTES # BLD: 0.6 K/UL (ref 0–1.3)
MONOCYTES NFR BLD: 8.8 %
NEUTROPHILS # BLD: 4 K/UL (ref 1.7–7.7)
NEUTROPHILS NFR BLD: 59.8 %
PLATELET # BLD AUTO: 224 K/UL (ref 135–450)
PMV BLD AUTO: 7 FL (ref 5–10.5)
POTASSIUM SERPL-SCNC: 3.6 MMOL/L (ref 3.5–5.1)
RBC # BLD AUTO: 3.51 M/UL (ref 4–5.2)
SODIUM SERPL-SCNC: 134 MMOL/L (ref 136–145)
WBC # BLD AUTO: 6.7 K/UL (ref 4–11)

## 2023-09-29 PROCEDURE — 97530 THERAPEUTIC ACTIVITIES: CPT

## 2023-09-29 PROCEDURE — 6360000002 HC RX W HCPCS: Performed by: INTERNAL MEDICINE

## 2023-09-29 PROCEDURE — 6370000000 HC RX 637 (ALT 250 FOR IP): Performed by: NURSE PRACTITIONER

## 2023-09-29 PROCEDURE — 36415 COLL VENOUS BLD VENIPUNCTURE: CPT

## 2023-09-29 PROCEDURE — 80048 BASIC METABOLIC PNL TOTAL CA: CPT

## 2023-09-29 PROCEDURE — 94760 N-INVAS EAR/PLS OXIMETRY 1: CPT

## 2023-09-29 PROCEDURE — 85025 COMPLETE CBC W/AUTO DIFF WBC: CPT

## 2023-09-29 PROCEDURE — 97116 GAIT TRAINING THERAPY: CPT

## 2023-09-29 PROCEDURE — 97535 SELF CARE MNGMENT TRAINING: CPT

## 2023-09-29 RX ORDER — LEVOFLOXACIN 5 MG/ML
750 INJECTION, SOLUTION INTRAVENOUS EVERY 24 HOURS
Status: DISCONTINUED | OUTPATIENT
Start: 2023-09-29 | End: 2023-09-29

## 2023-09-29 RX ORDER — LEVOFLOXACIN 500 MG/1
500 TABLET, FILM COATED ORAL NIGHTLY
Status: DISCONTINUED | OUTPATIENT
Start: 2023-09-29 | End: 2023-09-29 | Stop reason: HOSPADM

## 2023-09-29 RX ORDER — LEVOFLOXACIN 500 MG/1
500 TABLET, FILM COATED ORAL NIGHTLY
Qty: 4 TABLET | Refills: 0 | Status: SHIPPED | OUTPATIENT
Start: 2023-09-29 | End: 2023-10-03

## 2023-09-29 RX ORDER — LEVOFLOXACIN 500 MG/1
500 TABLET, FILM COATED ORAL NIGHTLY
Qty: 4 TABLET | Refills: 0 | Status: SHIPPED | OUTPATIENT
Start: 2023-09-29 | End: 2023-09-29 | Stop reason: SDUPTHER

## 2023-09-29 RX ADMIN — OXYCODONE HYDROCHLORIDE 10 MG: 10 TABLET ORAL at 12:00

## 2023-09-29 RX ADMIN — OXYCODONE HYDROCHLORIDE 10 MG: 10 TABLET ORAL at 07:03

## 2023-09-29 RX ADMIN — ENOXAPARIN SODIUM 40 MG: 100 INJECTION SUBCUTANEOUS at 08:38

## 2023-09-29 RX ADMIN — LEVOFLOXACIN 500 MG: 500 TABLET, FILM COATED ORAL at 01:57

## 2023-09-29 RX ADMIN — OXYCODONE HYDROCHLORIDE 10 MG: 10 TABLET ORAL at 03:04

## 2023-09-29 ASSESSMENT — PAIN DESCRIPTION - LOCATION
LOCATION: HIP

## 2023-09-29 ASSESSMENT — PAIN DESCRIPTION - PAIN TYPE
TYPE: SURGICAL PAIN

## 2023-09-29 ASSESSMENT — PAIN DESCRIPTION - DESCRIPTORS
DESCRIPTORS: ACHING
DESCRIPTORS: BURNING
DESCRIPTORS: ACHING
DESCRIPTORS: ACHING

## 2023-09-29 ASSESSMENT — PAIN SCALES - GENERAL
PAINLEVEL_OUTOF10: 7
PAINLEVEL_OUTOF10: 6
PAINLEVEL_OUTOF10: 8
PAINLEVEL_OUTOF10: 4
PAINLEVEL_OUTOF10: 6
PAINLEVEL_OUTOF10: 8

## 2023-09-29 ASSESSMENT — PAIN DESCRIPTION - FREQUENCY
FREQUENCY: CONTINUOUS
FREQUENCY: INTERMITTENT
FREQUENCY: INTERMITTENT
FREQUENCY: CONTINUOUS

## 2023-09-29 ASSESSMENT — PAIN DESCRIPTION - ORIENTATION
ORIENTATION: RIGHT

## 2023-09-29 ASSESSMENT — PAIN DESCRIPTION - ONSET
ONSET: ON-GOING

## 2023-09-29 NOTE — CARE COORDINATION
09/29/23 1227   IMM Letter   IMM Letter given to Patient/Family/Significant other/Guardian/POA/by: to patient   IMM Letter date given: 09/29/23   IMM Letter time given: 1200     Education provided to patient. Patient reported no questions and verbalized understanding. Patient made aware that he/she has 4 hours prior to discharging from hospital to decide if he/she wishes to pursue the Medicare appeal process.

## 2023-09-29 NOTE — PROGRESS NOTES
[facility] VALERIE Scanner Results  Pt was scanned according to 's recommendations. Daily      Site Reading Redness noted? Y/N   Right heel 0.3 n   Left heel  0.1 n   Sacrum 1.9 n       [x] VALERIE Delta score > or = to 0.6 indicates at risk tissue. Implement target prevention interventions below and scan daily.   [x] Ramana orders reviewed and updated if indicated  [x] Educated patient/family on importance of offloading/repositioning  [x] Patient agreeable to plan for pressure offloading/repositioning  Extra pillows and Pre-inflated chair cushion  [x] For heels, apply liquid skin barrier twice daily and ensure offloading with pillows or boots  Electronically signed by Juan Pablo Amado RN on 9/28/2023 at 8:51 PM

## 2023-09-29 NOTE — PROGRESS NOTES
Occupational Therapy  Facility/Department: [address] ORTHOPEDICS  Occupational Therapy Treatment Note    Name: Karen Smallwood  : 1970  MRN: [id number]  Date of Service: 2023    Discharge Recommendations:  Patient would benefit from continued therapy after discharge, 2-3 sessions per week, Home with Home health OT, 24 hour supervision or assist        Karen Smallwood scored a 18/24 on the AM-PAC ADL Inpatient form. Current research shows that an AM-PAC score of 18 or greater is typically associated with a discharge to the patient's home setting. Based on the patient's AM-PAC score, and their current ADL deficits, it is recommended that the patient have 2-3 sessions per week of Occupational Therapy at d/c to increase the patient's independence. At this time, this patient demonstrates the endurance and safety to discharge home with [address] (home vs OP services) and a follow up treatment frequency of 2-3x/wk. Please see assessment section for further patient specific details. If patient discharges prior to next session this note will serve as a discharge summary. Please see below for the latest assessment towards goals. Patient Diagnosis(es): The encounter diagnosis was Closed fracture of neck of right femur, initial encounter ([address]). Past Medical History:  has a past medical history of Anxiety and depression, Arthritis, Asthma, Chronic pain syndrome, Complication of joint prosthesis ([address]), COPD (chronic obstructive pulmonary disease) ([address]), Failed back surgical syndrome, Failed neck syndrome, Fibromyalgia, GERD (gastroesophageal reflux disease), Influenza A, MDRO (multiple drug resistant organisms) resistance, Obesity (BMI 30-39.9), and Pneumonia. Past Surgical History:  has a past surgical history that includes  section; hernia repair; Spine surgery; Knee Arthrocentesis; Upper gastrointestinal endoscopy (2015); joint replacement; Abdomen surgery; back surgery;  Endoscopy, colon,

## 2023-09-29 NOTE — CARE COORDINATION
[facility]    DC order noted, all docs needed have been faxed to [facility] for home care services.     Home care to see patient within 24-48 hrs    Rosalina Ward RN, BSN CTN  [facility] [phone]

## 2023-09-29 NOTE — PROGRESS NOTES
Data- discharge order received, pt verbalized agreement to discharge, disposition to previous residence. Action- discharge instructions prepared and given to patient, pt verbalized understanding. Medication information packet given r/t NEW and/or CHANGED prescriptions emphasizing name/purpose/side effects, pt verbalized understanding. Discharge instruction summary: Diet- as tolerated, Activity- WBAT with walker, Primary Care Physician as follows: Phyllistbart Jenkins [phone] f/u appointment 1-12 with Dr. Herb Jules, prescription medications filled paper prescriptions given to patient to fill at preferred pharmacy. Response- Pt belongings gathered, IV removed. Disposition is home (no HHC/DME needs), transported with belongings, taken to lobby via w/c w/ belongings, no complications.   Electronically signed by Rolly Rutledge RN on 9/29/2023 at 2:00 PM

## 2023-09-29 NOTE — PROGRESS NOTES
Rcvd n.o from NP to give Levaquin due to increased temp o 100.2 oral. Please NP Benito PRABHAKAR Note.

## 2023-09-29 NOTE — PROGRESS NOTES
Pt resting quietly in bed with eyes closed, resp easy and unlabored. Pt has no IV access at this time. No s/s of acute distress noted at this time. Appears comfortable. Call light is within reach. Will continue to monitor.

## 2023-09-29 NOTE — PROGRESS NOTES
Continuous fever. UA with moderate leucoesterase and 36wbcs. Will stat levaquin for UTI pending cultures.

## 2023-09-29 NOTE — CARE COORDINATION
DISCHARGE SUMMARY     DATE OF DISCHARGE: 9/29/2023    DISCHARGE DESTINATION: home      HOME CARE AGENCY:   Discharging to Facility/ Agency   Name:  [facility] care    Address: [place]  Phone: [phone]  Fax: [phone]     DME ORDERED: rolling walker through [facility] per insurance contract    TRANSPORTATION:   family    COMMENTS: Spoke with patient who reported that he plan has changed to go home with home care and support of family. COnfirmed this with PT/OT. Patient will need home care and rolling walker. Provided patient with home care list; Marissa Diego is the choice. Referral to [facility] liaison and to [facility] ([phone]; [phone]). Faxed order and supporting documentation for rolling walker to [facility]. Rn aware. Cha Castellanos at [facility].    Electronically signed by SONY Puente, LISW, Case Management on 9/29/2023 at 12:22 PM  Alto [phone]

## 2023-09-29 NOTE — PLAN OF CARE
Problem: Discharge Planning  Goal: Discharge to home or other facility with appropriate resources  9/29/2023 1400 by Ramon Roman RN  Outcome: Progressing  Flowsheets (Taken 9/28/2023 4630)  Discharge to home or other facility with appropriate resources:   Identify barriers to discharge with patient and caregiver   Identify discharge learning needs (meds, wound care, etc)   Refer to discharge planning if patient needs post-hospital services based on physician order or complex needs related to functional status, cognitive ability or social support system   Arrange for needed discharge resources and transportation as appropriate     Problem: Pain  Goal: Verbalizes/displays adequate comfort level or baseline comfort level  9/29/2023 1400 by Ramon Roman RN  Outcome: Progressing  Flowsheets (Taken 9/25/2023 1230 by Alice Gifford RN)  Verbalizes/displays adequate comfort level or baseline comfort level:   Encourage patient to monitor pain and request assistance   Assess pain using appropriate pain scale   Administer analgesics based on type and severity of pain and evaluate response   Implement non-pharmacological measures as appropriate and evaluate response     Problem: Skin/Tissue Integrity  Goal: Absence of new skin breakdown  Description: 1. Monitor for areas of redness and/or skin breakdown  2. Assess vascular access sites hourly  3. Every 4-6 hours minimum:  Change oxygen saturation probe site  4. Every 4-6 hours:  If on nasal continuous positive airway pressure, respiratory therapy assess nares and determine need for appliance change or resting period.   9/29/2023 1400 by Ramon Roman RN  Outcome: Progressing     Problem: Safety - Adult  Goal: Free from fall injury  9/29/2023 1400 by Ramon Roman RN  Outcome: Progressing     Problem: ABCDS Injury Assessment  Goal: Absence of physical injury  9/29/2023 1400 by Ramon Roman RN  Outcome: Progressing     Problem: Chronic Conditions and Co-morbidities  Goal: Patient's chronic conditions and co-morbidity symptoms are monitored and maintained or improved  9/29/2023 1400 by Christel Hernandez RN  Outcome: Progressing

## 2023-09-29 NOTE — CARE COORDINATION
[facility]    Referral received from  to follow for home care services. I will follow for needs, and speak with patient to verify demos.     Alyssa Dumont RN, BSN CTN  [facility] [phone]

## 2023-09-29 NOTE — PLAN OF CARE
Problem: Pain  Goal: Verbalizes/displays adequate comfort level or baseline comfort level  Outcome: Progressing  Flowsheets (Taken 9/25/2023 2145 by Zhou Mederos RN)  Verbalizes/displays adequate comfort level or baseline comfort level:   Encourage patient to monitor pain and request assistance   Assess pain using appropriate pain scale   Administer analgesics based on type and severity of pain and evaluate response   Implement non-pharmacological measures as appropriate and evaluate response     Problem: ABCDS Injury Assessment  Goal: Absence of physical injury  Outcome: Progressing  Flowsheets (Taken 9/29/2023 0334)  Absence of Physical Injury: Implement safety measures based on patient assessment

## 2023-09-29 NOTE — PROGRESS NOTES
Physical Therapy  Facility/Department: [address] ORTHOPEDICS  Daily Treatment Note    This note serves as patient discharge summary if pt discharges prior to next PT visit      Name: Venkat Jaquez  : 1970  MRN: [id number]  Date of Service: 2023    Discharge Recommendations:  Patient would benefit from continued therapy after discharge (2-3)   PT Equipment Recommendations  Equipment Needed: Yes  Mobility Devices: [place]: Rolling  Other: [address]       Patient Diagnosis(es): The encounter diagnosis was Closed fracture of neck of right femur, initial encounter ([address]). Past Medical History:  has a past medical history of Anxiety and depression, Arthritis, Asthma, Chronic pain syndrome, Complication of joint prosthesis ([address]), COPD (chronic obstructive pulmonary disease) ([address]), Failed back surgical syndrome, Failed neck syndrome, Fibromyalgia, GERD (gastroesophageal reflux disease), Influenza A, MDRO (multiple drug resistant organisms) resistance, Obesity (BMI 30-39.9), and Pneumonia. Past Surgical History:  has a past surgical history that includes  section; hernia repair; Spine surgery; Knee Arthrocentesis; Upper gastrointestinal endoscopy (2015); joint replacement; Abdomen surgery; back surgery; Endoscopy, colon, diagnostic; and Total hip arthroplasty (Right, 2023). Assessment   Body Structures, Functions, Activity Limitations Requiring Skilled Therapeutic Intervention: Increased pain;Decreased ADL status  Assessment: Per H and P:  \"The patient is a 52 y.o. female who presents to [facility] with right hip pain status post mechanical fall. \"   Work up reveals Right femoral neck fracture. On 23 Dr. Jm Fabian performs Right  Total hip arthroplasty. WBAT. No positional precautions. At baseline is fully independent without device. Status 23:  Bed Mobility SBA. Transfers to walker, multiple times, SBA and initial cues.   Gait with CAMILO HEIGHT wh walker

## 2023-09-29 NOTE — PROGRESS NOTES
Patient is alert & oriented x4, x1 assist with walker, resting in bed, on room air. 2/4 bed rails up, bed in lowest position, fall precautions in place, bed alarm on, call light within reach. Morning medications given as ordered, tolerated well. Pt c/o pain in R hip, PRN medications given. Denies further needs at this time. Will cont to monitor and reassess.   Electronically signed by Erlin Paniagua RN on 9/29/2023 at 1:58 PM

## 2023-09-29 NOTE — PROGRESS NOTES
Patient A&O x4, gets up with walker x1, resting in bed, on room air. PRN pain medications given for R hip pain. Call light within reach, able to make needs known, bed alarm on, fall precautions in place. SCDs in place and on. Denies further needs at this time.    Electronically signed by Deandre Arora RN on 9/28/2023 at 8:02 PM

## 2023-10-02 LAB — BACTERIA BLD CULT: NORMAL

## 2023-10-06 ENCOUNTER — TELEPHONE (OUTPATIENT)
Dept: ORTHOPEDIC SURGERY | Age: 53
End: 2023-10-06

## 2023-10-06 NOTE — TELEPHONE ENCOUNTER
General Question     Subject: RETURNING CALL   Patient and /or Facility Request: Miranda Espinosa  Contact Number: [phone]    PATIENT CALLING RETURNING SHYAM'S CALL REGARDING HER MESSAGE     PLEASE ANAND THE PATIENT BACK AT THE ABOVE NUMBER

## 2023-10-06 NOTE — TELEPHONE ENCOUNTER
General Question     Subject: POST OP QUESTIONS  Patient and /or Facility Request: Tess Mosquera  Contact Number: [phone]    PT CLLED REQ SCRIPT FOR COMPRESSION SOCK THE ONES THAT SHE RECEIVED ARE TO SMALL.   SHE HAS A LOT OF PAIN DURING THE NIGHT AND FIRST THING IN THE AM HAS BEEN TRYING TYLENOL WITH NO HELP SAID SHE ISN'T ALLERGIC TO GABAPENTIN AND WOULD LIKE THAT PRESCRIPT AND SHE HAS HAD SWELLING WOULD LIKE TO KNOW IF SHOULD TAKE A WATER PILL  ALSO RECEIVED A WALKER SHE ALREADY HAS A WALKER WOULD LIKE TO RETURN AND GET A CANE  PLEASE CLL PT AT N NUMBER LISTED ABOVE

## 2023-10-06 NOTE — TELEPHONE ENCOUNTER
I did speak to the pt about her last message below. With the stocking I did tell her that we want that stocking to be tight. Pt stated that it's too tight turning her leg purple. She mentioned that even the nurse said it was too tight. She also mentioned that it's still purple even without the stocking. I told er that could be bruising from the sx. I did tell the pt Dr Warner Almodovar would like her to wear the stocking until her PO. I told her she can purchase a bigger size from any Pharmacy. She said she is unable to go out and get some. I suggested for her to order some and have them shipped to her home. Pt is also only taking Tylenol for the pain but it's not helping. The pt stated that she is reluctant to take the oxycodone. I did explain that the goal is to stay on top of the pain. I suggested for to take a few pills to get the pain under control and see if that helps     Pt would also like to now if she can take a water pill for whole body swelling. I told her I would reach out to Dr Warner Almodovar and ask him about           General Question      Subject: POST OP QUESTIONS  Patient and /or Facility Request: Jl Hawkins  Contact Number: [phone]     PT CLLED REQ SCRIPT FOR COMPRESSION SOCK THE ONES THAT SHE RECEIVED ARE TO SMALL.   SHE HAS A LOT OF PAIN DURING THE NIGHT AND FIRST THING IN THE AM HAS BEEN TRYING TYLENOL WITH NO HELP SAID SHE ISN'T ALLERGIC TO GABAPENTIN AND WOULD LIKE THAT PRESCRIPT AND SHE HAS HAD SWELLING WOULD LIKE TO KNOW IF SHOULD TAKE A WATER PILL  ALSO RECEIVED A WALKER SHE ALREADY HAS A WALKER WOULD LIKE TO RETURN AND GET A CANE  PLEASE CLL PT AT N NUMBER LISTED ABOVE

## 2023-10-12 ENCOUNTER — TELEPHONE (OUTPATIENT)
Dept: ORTHOPEDIC SURGERY | Age: 53
End: 2023-10-12

## 2023-10-12 NOTE — TELEPHONE ENCOUNTER
General Question     Subject: POST OP APPT  Patient and /or Facility Request: Russell Pena  Contact Number: [phone]    PATIENT'S DAUGHTER KAYLEIGH CALLED STATING PT IS SCHEDULED TO HAVE HER FIRST POST OP APPT WITH DR. Colby Loving  [address] AT 2:15 PM. HOWEVER PATIENT'S TRANSPORTATION NEVER SHOWED UP AND THEY KEEP HANGING UP THE PHONE ON HER. PATIENT WONT BE ABLE TO MAKE APPT TODAY BUT ISNT SURE WHAT TO DO SINCE THIS IS HER FIRST POST OP APPT. PT'S DAUGHTER KAYLEIGH IS REQUESTING A CALL BACK. PLEASE CALL PT'S DAUGHTER KAYLEIGH BACK AT THE ABOVE NUMBER.

## 2023-10-16 ENCOUNTER — OFFICE VISIT (OUTPATIENT)
Dept: ORTHOPEDIC SURGERY | Age: 53
End: 2023-10-16

## 2023-10-16 VITALS — WEIGHT: 163 LBS | HEIGHT: 60 IN | BODY MASS INDEX: 32 KG/M2

## 2023-10-16 DIAGNOSIS — Z96.641 S/P TOTAL RIGHT HIP ARTHROPLASTY: Primary | ICD-10-CM

## 2023-10-16 RX ORDER — OXYCODONE HYDROCHLORIDE 5 MG/1
5 TABLET ORAL EVERY 8 HOURS PRN
Qty: 21 TABLET | Refills: 0 | Status: SHIPPED | OUTPATIENT
Start: 2023-10-16 | End: 2023-10-23

## 2023-10-16 RX ORDER — METHOCARBAMOL 750 MG/1
750 TABLET, FILM COATED ORAL NIGHTLY PRN
Qty: 14 TABLET | Refills: 0 | Status: SHIPPED | OUTPATIENT
Start: 2023-10-16 | End: 2023-10-30

## 2023-10-16 NOTE — PROGRESS NOTES
Patient: Roxy Lehman                  : 1970   MRN: [id number]   Date of Visit: 10/16/23     Physician: Neftali Foster MD.     Reason for Visit: Status post ANT for fracture    Subjective History of Present Illness:     Roxy Lehman is here for regularly scheduled follow-up s/p RIGHT ANT for femoral neck fracture. Reports they are doing well, occasional aches and pains are controlled with over the counter medications. Taking opioid medications sparingly and continuing to wean. They do not report fevers, chills or drainage from the incision site    Physical Examination??: ?   General: Patient is alert and oriented x 3 and appears comfortable. Incision is well-approximated, no erythema, fluctuance   Fires quad, SILT to thigh     Radiographs: AP pelvis/AP/lateral hip views of operative hip with well-positioned total hip arthroplasty. No evidence of fracture subluxation or dislocation. Assessment and Plan?: The patient is progressing well approximately 2 weeks s/p ANT     1. A thorough discussion was had with the patient concerning the ?postoperative course and the patient is in agreement with the plan. 2. They will continue to wean from pain medications and progress weight bearing  Provided her prescription for oxycodone and robaxin  4. Will see the patient in 4 weeks with repeat xrays at that time.     _______________________      Neftali Foster MD

## 2023-10-30 ENCOUNTER — TELEPHONE (OUTPATIENT)
Dept: ORTHOPEDIC SURGERY | Age: 53
End: 2023-10-30

## 2023-10-30 NOTE — TELEPHONE ENCOUNTER
Prescription REQ    Medication Name:  PAIN MEDICATION   Pharmacy: [address]: [phone]  Patient Contact Number:  [phone] MIGHT HAVE TO CALL A SECOND TIME DUE TO CALL SETTINGS WILL SEND T VM    PT HAD SX WITH DR Grant CAN ON RT THR ON 9-. PT CALLING IN TO REQ A PAIN MEDICATION. PT PREFER NOT TO BE PRESCRIBED A NARCO. PT NEXT APPT IS ON 11- FOR A POST OP APPT WITH DR. Keira Marsh. PLEASE CALL BACK PT AT THE ABOVE NUMBER LISTED.

## 2023-10-31 DIAGNOSIS — Z47.89 AFTERCARE FOLLOWING SURGERY OF THE MUSCULOSKELETAL SYSTEM: Primary | ICD-10-CM

## 2023-10-31 RX ORDER — TRAMADOL HYDROCHLORIDE 50 MG/1
50-100 TABLET ORAL EVERY 8 HOURS PRN
Qty: 28 TABLET | Refills: 0 | Status: SHIPPED | OUTPATIENT
Start: 2023-10-31 | End: 2023-11-07

## 2024-09-10 ENCOUNTER — APPOINTMENT (OUTPATIENT)
Dept: GENERAL RADIOLOGY | Age: 54
DRG: 202 | End: 2024-09-10
Payer: MEDICARE

## 2024-09-10 ENCOUNTER — HOSPITAL ENCOUNTER (INPATIENT)
Age: 54
LOS: 1 days | Discharge: HOME | DRG: 202 | End: 2024-09-11
Attending: EMERGENCY MEDICINE | Admitting: INTERNAL MEDICINE
Payer: MEDICARE

## 2024-09-10 DIAGNOSIS — J96.02 ACUTE RESPIRATORY FAILURE WITH HYPERCAPNIA (HCC): Primary | ICD-10-CM

## 2024-09-10 DIAGNOSIS — J45.20 INTERMITTENT ASTHMA WITHOUT COMPLICATION, UNSPECIFIED ASTHMA SEVERITY: ICD-10-CM

## 2024-09-10 DIAGNOSIS — J44.1 COPD EXACERBATION (HCC): ICD-10-CM

## 2024-09-10 PROBLEM — J45.901 ACUTE ASTHMA EXACERBATION: Status: ACTIVE | Noted: 2024-09-10

## 2024-09-10 LAB
ALBUMIN SERPL-MCNC: 4.7 G/DL (ref 3.4–5)
ALBUMIN/GLOB SERPL: 1.6 {RATIO} (ref 1.1–2.2)
ALP SERPL-CCNC: 134 U/L (ref 40–129)
ALT SERPL-CCNC: 19 U/L (ref 10–40)
ANION GAP SERPL CALCULATED.3IONS-SCNC: 14 MMOL/L (ref 3–16)
AST SERPL-CCNC: 25 U/L (ref 15–37)
BASE EXCESS BLDV CALC-SCNC: -1.2 MMOL/L
BASOPHILS # BLD: 0.1 K/UL (ref 0–0.2)
BASOPHILS NFR BLD: 0.9 %
BILIRUB SERPL-MCNC: 0.4 MG/DL (ref 0–1)
BUN SERPL-MCNC: 15 MG/DL (ref 7–20)
CALCIUM SERPL-MCNC: 9.1 MG/DL (ref 8.3–10.6)
CHLORIDE SERPL-SCNC: 103 MMOL/L (ref 99–110)
CO2 BLDV-SCNC: 28 MMOL/L
CO2 SERPL-SCNC: 24 MMOL/L (ref 21–32)
COHGB MFR BLDV: 0.9 %
CREAT SERPL-MCNC: 0.8 MG/DL (ref 0.6–1.1)
DEPRECATED RDW RBC AUTO: 13.6 % (ref 12.4–15.4)
EKG ATRIAL RATE: 76 BPM
EKG DIAGNOSIS: NORMAL
EKG P AXIS: 67 DEGREES
EKG P-R INTERVAL: 156 MS
EKG Q-T INTERVAL: 396 MS
EKG QRS DURATION: 86 MS
EKG QTC CALCULATION (BAZETT): 445 MS
EKG R AXIS: 18 DEGREES
EKG T AXIS: 44 DEGREES
EKG VENTRICULAR RATE: 76 BPM
EOSINOPHIL # BLD: 1.4 K/UL (ref 0–0.6)
EOSINOPHIL NFR BLD: 15.1 %
GFR SERPLBLD CREATININE-BSD FMLA CKD-EPI: 86 ML/MIN/{1.73_M2}
GLUCOSE SERPL-MCNC: 100 MG/DL (ref 70–99)
HCO3 BLDV-SCNC: 27 MMOL/L (ref 23–29)
HCT VFR BLD AUTO: 48 % (ref 36–48)
HGB BLD-MCNC: 16.3 G/DL (ref 12–16)
LYMPHOCYTES # BLD: 1.3 K/UL (ref 1–5.1)
LYMPHOCYTES NFR BLD: 14.8 %
MCH RBC QN AUTO: 28.4 PG (ref 26–34)
MCHC RBC AUTO-ENTMCNC: 33.9 G/DL (ref 31–36)
MCV RBC AUTO: 83.7 FL (ref 80–100)
METHGB MFR BLDV: 0.6 %
MONOCYTES # BLD: 0.4 K/UL (ref 0–1.3)
MONOCYTES NFR BLD: 4.5 %
NEUTROPHILS # BLD: 5.9 K/UL (ref 1.7–7.7)
NEUTROPHILS NFR BLD: 64.7 %
NT-PROBNP SERPL-MCNC: 194 PG/ML (ref 0–124)
O2 THERAPY: ABNORMAL
PCO2 BLDV: 54.3 MMHG (ref 40–50)
PH BLDV: 7.3 [PH] (ref 7.35–7.45)
PLATELET # BLD AUTO: 342 K/UL (ref 135–450)
PMV BLD AUTO: 7.1 FL (ref 5–10.5)
PO2 BLDV: <30 MMHG
POTASSIUM SERPL-SCNC: 3.6 MMOL/L (ref 3.5–5.1)
PROT SERPL-MCNC: 7.7 G/DL (ref 6.4–8.2)
RBC # BLD AUTO: 5.74 M/UL (ref 4–5.2)
REASON FOR REJECTION: NORMAL
REJECTED TEST: NORMAL
REPORT: NORMAL
RESP PATH DNA+RNA PNL NPH NAA+NON-PROBE: NORMAL
SAO2 % BLDV: 40 %
SODIUM SERPL-SCNC: 141 MMOL/L (ref 136–145)
TROPONIN, HIGH SENSITIVITY: 9 NG/L (ref 0–14)
WBC # BLD AUTO: 9.1 K/UL (ref 4–11)

## 2024-09-10 PROCEDURE — G0378 HOSPITAL OBSERVATION PER HR: HCPCS

## 2024-09-10 PROCEDURE — 6370000000 HC RX 637 (ALT 250 FOR IP): Performed by: INTERNAL MEDICINE

## 2024-09-10 PROCEDURE — 94761 N-INVAS EAR/PLS OXIMETRY MLT: CPT

## 2024-09-10 PROCEDURE — 96374 THER/PROPH/DIAG INJ IV PUSH: CPT

## 2024-09-10 PROCEDURE — 1200000000 HC SEMI PRIVATE

## 2024-09-10 PROCEDURE — 6360000002 HC RX W HCPCS: Performed by: PHYSICIAN ASSISTANT

## 2024-09-10 PROCEDURE — 6360000002 HC RX W HCPCS: Performed by: INTERNAL MEDICINE

## 2024-09-10 PROCEDURE — 71045 X-RAY EXAM CHEST 1 VIEW: CPT

## 2024-09-10 PROCEDURE — 84484 ASSAY OF TROPONIN QUANT: CPT

## 2024-09-10 PROCEDURE — 82803 BLOOD GASES ANY COMBINATION: CPT

## 2024-09-10 PROCEDURE — 2580000003 HC RX 258

## 2024-09-10 PROCEDURE — 96375 TX/PRO/DX INJ NEW DRUG ADDON: CPT

## 2024-09-10 PROCEDURE — 6370000000 HC RX 637 (ALT 250 FOR IP): Performed by: PHYSICIAN ASSISTANT

## 2024-09-10 PROCEDURE — 2700000000 HC OXYGEN THERAPY PER DAY

## 2024-09-10 PROCEDURE — 96376 TX/PRO/DX INJ SAME DRUG ADON: CPT

## 2024-09-10 PROCEDURE — 85025 COMPLETE CBC W/AUTO DIFF WBC: CPT

## 2024-09-10 PROCEDURE — 80053 COMPREHEN METABOLIC PANEL: CPT

## 2024-09-10 PROCEDURE — 99285 EMERGENCY DEPT VISIT HI MDM: CPT

## 2024-09-10 PROCEDURE — 93005 ELECTROCARDIOGRAM TRACING: CPT | Performed by: EMERGENCY MEDICINE

## 2024-09-10 PROCEDURE — 6360000002 HC RX W HCPCS: Performed by: EMERGENCY MEDICINE

## 2024-09-10 PROCEDURE — 96372 THER/PROPH/DIAG INJ SC/IM: CPT

## 2024-09-10 PROCEDURE — 93010 ELECTROCARDIOGRAM REPORT: CPT | Performed by: INTERNAL MEDICINE

## 2024-09-10 PROCEDURE — 83880 ASSAY OF NATRIURETIC PEPTIDE: CPT

## 2024-09-10 PROCEDURE — 2580000003 HC RX 258: Performed by: INTERNAL MEDICINE

## 2024-09-10 PROCEDURE — 0202U NFCT DS 22 TRGT SARS-COV-2: CPT

## 2024-09-10 PROCEDURE — 94640 AIRWAY INHALATION TREATMENT: CPT

## 2024-09-10 RX ORDER — LEVOFLOXACIN 5 MG/ML
500 INJECTION, SOLUTION INTRAVENOUS EVERY 24 HOURS
Status: DISCONTINUED | OUTPATIENT
Start: 2024-09-10 | End: 2024-09-11 | Stop reason: HOSPADM

## 2024-09-10 RX ORDER — ALBUTEROL SULFATE 0.83 MG/ML
5 SOLUTION RESPIRATORY (INHALATION) ONCE
Status: COMPLETED | OUTPATIENT
Start: 2024-09-10 | End: 2024-09-10

## 2024-09-10 RX ORDER — SODIUM CHLORIDE 9 MG/ML
INJECTION, SOLUTION INTRAVENOUS PRN
Status: DISCONTINUED | OUTPATIENT
Start: 2024-09-10 | End: 2024-09-11 | Stop reason: HOSPADM

## 2024-09-10 RX ORDER — PREDNISONE 20 MG/1
40 TABLET ORAL DAILY
Status: DISCONTINUED | OUTPATIENT
Start: 2024-09-13 | End: 2024-09-11 | Stop reason: HOSPADM

## 2024-09-10 RX ORDER — IPRATROPIUM BROMIDE AND ALBUTEROL SULFATE 2.5; .5 MG/3ML; MG/3ML
2 SOLUTION RESPIRATORY (INHALATION) ONCE
Status: COMPLETED | OUTPATIENT
Start: 2024-09-10 | End: 2024-09-10

## 2024-09-10 RX ORDER — ACETAMINOPHEN 650 MG/1
650 SUPPOSITORY RECTAL EVERY 6 HOURS PRN
Status: DISCONTINUED | OUTPATIENT
Start: 2024-09-10 | End: 2024-09-11 | Stop reason: HOSPADM

## 2024-09-10 RX ORDER — ALBUTEROL SULFATE 0.83 MG/ML
2.5 SOLUTION RESPIRATORY (INHALATION) ONCE
Status: COMPLETED | OUTPATIENT
Start: 2024-09-10 | End: 2024-09-10

## 2024-09-10 RX ORDER — METHYLPREDNISOLONE SODIUM SUCCINATE 40 MG/ML
40 INJECTION, POWDER, LYOPHILIZED, FOR SOLUTION INTRAMUSCULAR; INTRAVENOUS EVERY 12 HOURS
Status: DISCONTINUED | OUTPATIENT
Start: 2024-09-10 | End: 2024-09-11 | Stop reason: HOSPADM

## 2024-09-10 RX ORDER — IBUPROFEN 600 MG/1
600 TABLET, FILM COATED ORAL 3 TIMES DAILY PRN
COMMUNITY

## 2024-09-10 RX ORDER — PROMETHAZINE HYDROCHLORIDE 25 MG/1
25 TABLET ORAL 3 TIMES DAILY PRN
COMMUNITY

## 2024-09-10 RX ORDER — ONDANSETRON 2 MG/ML
4 INJECTION INTRAMUSCULAR; INTRAVENOUS ONCE
Status: COMPLETED | OUTPATIENT
Start: 2024-09-10 | End: 2024-09-10

## 2024-09-10 RX ORDER — ONDANSETRON 4 MG/1
4 TABLET, ORALLY DISINTEGRATING ORAL EVERY 8 HOURS PRN
Status: DISCONTINUED | OUTPATIENT
Start: 2024-09-10 | End: 2024-09-11 | Stop reason: HOSPADM

## 2024-09-10 RX ORDER — ONDANSETRON 2 MG/ML
4 INJECTION INTRAMUSCULAR; INTRAVENOUS EVERY 6 HOURS PRN
Status: DISCONTINUED | OUTPATIENT
Start: 2024-09-10 | End: 2024-09-11 | Stop reason: HOSPADM

## 2024-09-10 RX ORDER — METHYLPREDNISOLONE SODIUM SUCCINATE 125 MG/2ML
125 INJECTION, POWDER, LYOPHILIZED, FOR SOLUTION INTRAMUSCULAR; INTRAVENOUS ONCE
Status: COMPLETED | OUTPATIENT
Start: 2024-09-10 | End: 2024-09-10

## 2024-09-10 RX ORDER — IPRATROPIUM BROMIDE AND ALBUTEROL SULFATE 2.5; .5 MG/3ML; MG/3ML
1 SOLUTION RESPIRATORY (INHALATION)
Status: DISCONTINUED | OUTPATIENT
Start: 2024-09-10 | End: 2024-09-11 | Stop reason: HOSPADM

## 2024-09-10 RX ORDER — SODIUM CHLORIDE 0.9 % (FLUSH) 0.9 %
5-40 SYRINGE (ML) INJECTION EVERY 12 HOURS SCHEDULED
Status: DISCONTINUED | OUTPATIENT
Start: 2024-09-10 | End: 2024-09-11 | Stop reason: HOSPADM

## 2024-09-10 RX ORDER — WATER 10 ML/10ML
INJECTION INTRAMUSCULAR; INTRAVENOUS; SUBCUTANEOUS
Status: COMPLETED
Start: 2024-09-10 | End: 2024-09-10

## 2024-09-10 RX ORDER — SODIUM CHLORIDE 0.9 % (FLUSH) 0.9 %
5-40 SYRINGE (ML) INJECTION PRN
Status: DISCONTINUED | OUTPATIENT
Start: 2024-09-10 | End: 2024-09-11 | Stop reason: HOSPADM

## 2024-09-10 RX ORDER — ALBUTEROL SULFATE 90 UG/1
2 AEROSOL, METERED RESPIRATORY (INHALATION) EVERY 4 HOURS PRN
Status: DISCONTINUED | OUTPATIENT
Start: 2024-09-10 | End: 2024-09-11 | Stop reason: HOSPADM

## 2024-09-10 RX ORDER — ENOXAPARIN SODIUM 100 MG/ML
40 INJECTION SUBCUTANEOUS EVERY 24 HOURS
Status: DISCONTINUED | OUTPATIENT
Start: 2024-09-10 | End: 2024-09-11 | Stop reason: HOSPADM

## 2024-09-10 RX ORDER — ALBUTEROL SULFATE 90 UG/1
2 AEROSOL, METERED RESPIRATORY (INHALATION) EVERY 4 HOURS PRN
Status: ON HOLD | COMMUNITY
End: 2024-09-11

## 2024-09-10 RX ORDER — POLYETHYLENE GLYCOL 3350 17 G/17G
17 POWDER, FOR SOLUTION ORAL DAILY PRN
Status: DISCONTINUED | OUTPATIENT
Start: 2024-09-10 | End: 2024-09-11 | Stop reason: HOSPADM

## 2024-09-10 RX ORDER — ACETAMINOPHEN 325 MG/1
650 TABLET ORAL EVERY 6 HOURS PRN
Status: DISCONTINUED | OUTPATIENT
Start: 2024-09-10 | End: 2024-09-11 | Stop reason: HOSPADM

## 2024-09-10 RX ADMIN — WATER 10 ML: 1 INJECTION INTRAMUSCULAR; INTRAVENOUS; SUBCUTANEOUS at 20:14

## 2024-09-10 RX ADMIN — ENOXAPARIN SODIUM 40 MG: 100 INJECTION SUBCUTANEOUS at 18:01

## 2024-09-10 RX ADMIN — SODIUM CHLORIDE, PRESERVATIVE FREE 10 ML: 5 INJECTION INTRAVENOUS at 20:14

## 2024-09-10 RX ADMIN — ALBUTEROL SULFATE 2.5 MG: 2.5 SOLUTION RESPIRATORY (INHALATION) at 10:27

## 2024-09-10 RX ADMIN — IPRATROPIUM BROMIDE AND ALBUTEROL SULFATE 1 DOSE: 2.5; .5 SOLUTION RESPIRATORY (INHALATION) at 20:41

## 2024-09-10 RX ADMIN — ACETAMINOPHEN 325MG 650 MG: 325 TABLET ORAL at 20:23

## 2024-09-10 RX ADMIN — METHYLPREDNISOLONE SODIUM SUCCINATE 40 MG: 40 INJECTION INTRAMUSCULAR; INTRAVENOUS at 20:14

## 2024-09-10 RX ADMIN — ALBUTEROL SULFATE 5 MG: 2.5 SOLUTION RESPIRATORY (INHALATION) at 11:32

## 2024-09-10 RX ADMIN — METHYLPREDNISOLONE SODIUM SUCCINATE 125 MG: 125 INJECTION INTRAMUSCULAR; INTRAVENOUS at 10:14

## 2024-09-10 RX ADMIN — WATER 2 ML: 1 INJECTION INTRAMUSCULAR; INTRAVENOUS; SUBCUTANEOUS at 10:14

## 2024-09-10 RX ADMIN — IPRATROPIUM BROMIDE AND ALBUTEROL SULFATE 2 DOSE: 2.5; .5 SOLUTION RESPIRATORY (INHALATION) at 10:27

## 2024-09-10 RX ADMIN — LEVOFLOXACIN 500 MG: 5 INJECTION, SOLUTION INTRAVENOUS at 18:07

## 2024-09-10 RX ADMIN — ONDANSETRON 4 MG: 2 INJECTION INTRAMUSCULAR; INTRAVENOUS at 12:27

## 2024-09-10 ASSESSMENT — PAIN SCALES - GENERAL
PAINLEVEL_OUTOF10: 4
PAINLEVEL_OUTOF10: 8

## 2024-09-10 ASSESSMENT — PAIN DESCRIPTION - LOCATION
LOCATION: HEAD
LOCATION: HEAD

## 2024-09-10 ASSESSMENT — PAIN DESCRIPTION - DESCRIPTORS
DESCRIPTORS: DISCOMFORT
DESCRIPTORS: ACHING

## 2024-09-10 ASSESSMENT — LIFESTYLE VARIABLES: HOW OFTEN DO YOU HAVE A DRINK CONTAINING ALCOHOL: NEVER

## 2024-09-10 NOTE — H&P
Hospital Medicine History & Physical      PCP: Wolfgang Caraballo MD    Date of Admission: 9/10/2024    Date of Service: Pt seen/examined on 09/10/2024 and Admitted to Inpatient with expected LOS greater than two midnights due to medical therapy.     Chief Complaint: Shortness of breath      History Of Present Illness:     54 y.o. female who presented to [facility] with shortness of breath and cough in the last 2 to 3 days, worsening, no obvious trigger, gradually worsening, no obvious elevated or aggravating factors, subjective fever no chills having dry cough also, no sick contact, in ED found to be hypoxic initially, being admitted for further management and treatment discussed with ED provider agrees plan as outlined below    Past Medical History:          Diagnosis Date    Anxiety and depression     Arthritis     Asthma     Chronic pain syndrome     Complication of joint prosthesis (HCC)     COPD (chronic obstructive pulmonary disease) (HCC)     Failed back surgical syndrome     Failed neck syndrome     Fibromyalgia     GERD (gastroesophageal reflux disease)     Influenza A 2019    MDRO (multiple drug resistant organisms) resistance 2018    sputum    Obesity (BMI 30-39.9)     Pneumonia        Past Surgical History:          Procedure Laterality Date    ABDOMEN SURGERY      BACK SURGERY       SECTION      x2    ENDOSCOPY, COLON, DIAGNOSTIC      HERNIA REPAIR      JOINT REPLACEMENT      right knee    KNEE ARTHROCENTESIS      removal of bilateral knees    SPINE SURGERY      5  back surgeries     TOTAL HIP ARTHROPLASTY Right 2023    RIGHT TOTAL HIP ARTHROPLASTY, ANTERIOR APPROACH performed by Perez Sun MD at [facility]    UPPER GASTROINTESTINAL ENDOSCOPY  2015       Medications Prior to Admission:      Prior to Admission medications    Medication Sig Start Date End Date Taking? Authorizing Provider   albuterol sulfate HFA (VENTOLIN HFA) 108 (90 Base) MCG/ACT inhaler Inhale 2 puffs

## 2024-09-10 NOTE — ED PROVIDER NOTES
[facility] EMERGENCY DEPARTMENT     EMERGENCY DEPARTMENT ENCOUNTER     Location: [facility] EMERGENCY DEPARTMENT  9/10/2024  Note Started: 9:19 AM EDT 9/10/24      Patient Identification  Cass Valerio is a 54 y.o. female      HPI:Cass Valerio was evaluated in the Emergency Department for difficulty breathing for the last 2 weeks.  Patient has a history of asthma and COPD.  She is not currently on steroids.  She has been using her nebulizer 3-4 times daily.  No fever, chills, body aches.  Positive cough. Although initial history and physical exam information was obtained by FRITZ/NPP/MD/DO (who also dictated a record of this visit), I personally saw the patient and performed and made/approved the management plan and take responsibility for the patient management.      PHYSICAL EXAM:  General: Alert white female in moderate respiratory distress.  HEENT: Conjunctiva are clear.  No pallor.  Oropharynx moist without erythema.  Heart: Regular rate and rhythm without murmur or gallop.  Lungs: Breath sounds diffusely decreased bilaterally with bilateral diffuse expiratory wheezing, prolonged expiratory phase.  Speaks in short sentences.  No retractions.  Abdomen: Soft, nondistended, nontender.  Musculoskeletal: No lower extremity edema.  Skin: Warm and dry, good turgor.  No pallor or cyanosis.  No diaphoresis    EKG Interpretation  Normal sinus rhythm, rate of 76.  Left atrial enlargement.  Poor R wave progression, cannot rule out age-indeterminate anterior infarct.  Rhythm strip shows a sinus rhythm with a rate of 76, HI interval 156 ms, QRS of 86 ms with no other ectopy as interpreted by me.  This compares to an EKG dated 9/25/2023, the poor R wave progression is new.  No other significant changes noted.    Labs Reviewed   CBC WITH AUTO DIFFERENTIAL - Abnormal; Notable for the following components:       Result Value    RBC 5.74 (*)     Hemoglobin 16.3 (*)     Eosinophils Absolute

## 2024-09-10 NOTE — PROGRESS NOTES
Donavan, transporter at bedside to take patient to new room of 4256. VSS upon departure from unit.

## 2024-09-10 NOTE — PROGRESS NOTES
Patient ambulated around unit. SpO2 stayed greater than 95%, however, patient reports feeling SOB with ambulation and HR did increase to low 100s while walking. Patient also had increased WOB. TIFFANIE Puga updated.

## 2024-09-10 NOTE — ED TRIAGE NOTES
Patient to ED from home c/o SOB. Patient has had \"cold like symptoms\" for about a week and was giving herself breathing treatments at home and the SOB would improve. However this morning she went to let her dog out and couldn't make it back into the house she felt so SOB. Patient given 2 duonebs en route to hospital. Upon arrival to hospital patient VSS on 4L nasal cannula. Patient does not wear oxygen at baseline.

## 2024-09-10 NOTE — ED PROVIDER NOTES
[facility] EMERGENCY DEPARTMENT  EMERGENCY DEPARTMENT ENCOUNTER      Pt Name: Cass Valerio  MRN:[id number]  Birthdate 1970  Date of evaluation: 9/10/2024  Provider: Edd Mata PA-C  Note Started: 1:44 PM EDT 9/10/24    This patient was seen evaluated by attending physician Dr. Kelvin Mansfield MD        Chief Complaint:    Chief Complaint   Patient presents with    Shortness of Breath     Patient to ED from home c/o SOB. Patient has had \"cold like symptoms\" for about a week and was giving herself breathing treatments at home and the SOB would improve. However this morning she went to let her dog out and couldn't make it back into the house she felt so SOB. Patient given 2 duonebs en route to hospital. Upon arrival to hospital patient VSS on 4L nasal cannula. Patient does not wear oxygen at baseline.          Nursing Notes, Past Medical Hx, Past Surgical Hx, Social Hx, Allergies, and Family Hx were all reviewed and agreed with or any disagreements were addressed in the HPI.    HPI: (Location, Duration, Timing, Severity, Quality, Assoc Sx, Context, Modifying factors)    History From: ***  {Limitations to history (Optional):11747}    {Social Determinants Significantly Affecting Health (Optional):82769}    Chief Complaint of ***    This is a  54 y.o. female who presents  ***    PastMedical/Surgical History:      Diagnosis Date    Anxiety and depression     Arthritis     Asthma     Chronic pain syndrome     Complication of joint prosthesis (HCC)     COPD (chronic obstructive pulmonary disease) (HCC)     Failed back surgical syndrome     Failed neck syndrome     Fibromyalgia     GERD (gastroesophageal reflux disease)     Influenza A 2019    MDRO (multiple drug resistant organisms) resistance 2018    sputum    Obesity (BMI 30-39.9)     Pneumonia          Procedure Laterality Date    ABDOMEN SURGERY      BACK SURGERY       SECTION      x2    ENDOSCOPY, COLON, DIAGNOSTIC    is okay with this.  I did place a consult out to hospitalist service to [facility] for admission.    Due to the immediate potential for life-threatening deterioration due to respiratory failure and COPD exacerbation, I spent 30 minutes providing critical care.  This time is excluding time spent performing procedures.        Disposition Considerations (Tests not ordered but considered, Shared Decision Making, Pt Expectation of Test or Tx.):     See discussion above          The patient tolerated their visit well.  I evaluated the patient.  The physician was available for consultation as needed.  The patient and / or the family were informed of the results of any tests, a time was given to answer questions, a plan was proposed and they agreed with plan.     I am the Primary Clinician of Record.     CLINICAL IMPRESSION:  1. Acute respiratory failure with hypercapnia (HCC)    2. COPD exacerbation (HCC)    3. Intermittent asthma without complication, unspecified asthma severity        DISPOSITION Admitted 09/10/2024 03:20:08 PM  Condition at Disposition: Data Unavailable      PATIENT REFERRED TO:  Wolfgang Caraballo MD  [address] [address] [phone]    Schedule an appointment as soon as possible for a visit in 1 week(s)  For posthospital follow-up      DISCHARGE MEDICATIONS:  Discharge Medication List as of 9/11/2024 11:04 AM        START taking these medications    Details   predniSONE (DELTASONE) 20 MG tablet Take 2 tablets by mouth daily for 5 doses, Disp-10 tablet, R-0Normal      levoFLOXacin (LEVAQUIN) 500 MG tablet Take 1 tablet by mouth daily for 3 days, Disp-3 tablet, R-0Normal      albuterol (PROVENTIL) (2.5 MG/3ML) 0.083% nebulizer solution Take 3 mLs by nebulization 4 times daily as needed for Wheezing, Disp-120 each, R-0Normal             DISCONTINUED MEDICATIONS:  Discharge Medication List as of 9/11/2024 11:04 AM        STOP taking these medications       aspirin

## 2024-09-10 NOTE — PROGRESS NOTES
Medication Reconciliation    List of medications patient is currently taking is complete.     Source of information: 1. Conversation with patient at bedside                                      2. EPIC records     Patient denies routine use of any prescription/OTC medications.    Mio Franco [facility], PharmD, BCPS  9/10/2024 11:35 AM

## 2024-09-10 NOTE — PROGRESS NOTES
Report called to 4th Abril RN to resume care. Patient in bed, VSS on room air. All questions answered.

## 2024-09-11 VITALS
DIASTOLIC BLOOD PRESSURE: 104 MMHG | TEMPERATURE: 98.1 F | RESPIRATION RATE: 20 BRPM | WEIGHT: 153.66 LBS | OXYGEN SATURATION: 98 % | HEART RATE: 88 BPM | BODY MASS INDEX: 30.01 KG/M2 | SYSTOLIC BLOOD PRESSURE: 163 MMHG

## 2024-09-11 LAB
ALBUMIN SERPL-MCNC: 4 G/DL (ref 3.4–5)
ALBUMIN/GLOB SERPL: 1.6 {RATIO} (ref 1.1–2.2)
ALP SERPL-CCNC: 123 U/L (ref 40–129)
ALT SERPL-CCNC: 13 U/L (ref 10–40)
ANION GAP SERPL CALCULATED.3IONS-SCNC: 10 MMOL/L (ref 3–16)
AST SERPL-CCNC: 19 U/L (ref 15–37)
BASOPHILS # BLD: 0.1 K/UL (ref 0–0.2)
BASOPHILS NFR BLD: 0.4 %
BILIRUB SERPL-MCNC: <0.2 MG/DL (ref 0–1)
BUN SERPL-MCNC: 25 MG/DL (ref 7–20)
CALCIUM SERPL-MCNC: 9.1 MG/DL (ref 8.3–10.6)
CHLORIDE SERPL-SCNC: 102 MMOL/L (ref 99–110)
CO2 SERPL-SCNC: 22 MMOL/L (ref 21–32)
CREAT SERPL-MCNC: 0.8 MG/DL (ref 0.6–1.1)
DEPRECATED RDW RBC AUTO: 13.6 % (ref 12.4–15.4)
EOSINOPHIL # BLD: 0 K/UL (ref 0–0.6)
EOSINOPHIL NFR BLD: 0 %
GFR SERPLBLD CREATININE-BSD FMLA CKD-EPI: 86 ML/MIN/{1.73_M2}
GLUCOSE SERPL-MCNC: 116 MG/DL (ref 70–99)
HCT VFR BLD AUTO: 44.1 % (ref 36–48)
HGB BLD-MCNC: 14.7 G/DL (ref 12–16)
LYMPHOCYTES # BLD: 1 K/UL (ref 1–5.1)
LYMPHOCYTES NFR BLD: 8 %
MCH RBC QN AUTO: 28 PG (ref 26–34)
MCHC RBC AUTO-ENTMCNC: 33.3 G/DL (ref 31–36)
MCV RBC AUTO: 84.1 FL (ref 80–100)
MONOCYTES # BLD: 0.7 K/UL (ref 0–1.3)
MONOCYTES NFR BLD: 6 %
NEUTROPHILS # BLD: 10.6 K/UL (ref 1.7–7.7)
NEUTROPHILS NFR BLD: 85.6 %
PLATELET # BLD AUTO: 356 K/UL (ref 135–450)
PMV BLD AUTO: 7.5 FL (ref 5–10.5)
POTASSIUM SERPL-SCNC: 4.5 MMOL/L (ref 3.5–5.1)
PROCALCITONIN SERPL IA-MCNC: 0.03 NG/ML (ref 0–0.15)
PROT SERPL-MCNC: 6.5 G/DL (ref 6.4–8.2)
RBC # BLD AUTO: 5.24 M/UL (ref 4–5.2)
SODIUM SERPL-SCNC: 134 MMOL/L (ref 136–145)
WBC # BLD AUTO: 12.4 K/UL (ref 4–11)

## 2024-09-11 PROCEDURE — 94760 N-INVAS EAR/PLS OXIMETRY 1: CPT

## 2024-09-11 PROCEDURE — 94640 AIRWAY INHALATION TREATMENT: CPT

## 2024-09-11 PROCEDURE — 6360000002 HC RX W HCPCS: Performed by: INTERNAL MEDICINE

## 2024-09-11 PROCEDURE — 6370000000 HC RX 637 (ALT 250 FOR IP): Performed by: INTERNAL MEDICINE

## 2024-09-11 PROCEDURE — 2580000003 HC RX 258

## 2024-09-11 PROCEDURE — 96376 TX/PRO/DX INJ SAME DRUG ADON: CPT

## 2024-09-11 PROCEDURE — G0378 HOSPITAL OBSERVATION PER HR: HCPCS

## 2024-09-11 PROCEDURE — 84145 PROCALCITONIN (PCT): CPT

## 2024-09-11 PROCEDURE — 36415 COLL VENOUS BLD VENIPUNCTURE: CPT

## 2024-09-11 PROCEDURE — 80053 COMPREHEN METABOLIC PANEL: CPT

## 2024-09-11 PROCEDURE — 85025 COMPLETE CBC W/AUTO DIFF WBC: CPT

## 2024-09-11 RX ORDER — ALBUTEROL SULFATE 90 UG/1
2 AEROSOL, METERED RESPIRATORY (INHALATION) EVERY 4 HOURS PRN
Qty: 18 G | Refills: 3 | Status: SHIPPED | OUTPATIENT
Start: 2024-09-11

## 2024-09-11 RX ORDER — PREDNISONE 20 MG/1
40 TABLET ORAL DAILY
Qty: 10 TABLET | Refills: 0 | Status: SHIPPED | OUTPATIENT
Start: 2024-09-13 | End: 2024-09-18

## 2024-09-11 RX ORDER — WATER 10 ML/10ML
INJECTION INTRAMUSCULAR; INTRAVENOUS; SUBCUTANEOUS
Status: COMPLETED
Start: 2024-09-11 | End: 2024-09-11

## 2024-09-11 RX ORDER — LEVOFLOXACIN 500 MG/1
500 TABLET, FILM COATED ORAL DAILY
Qty: 3 TABLET | Refills: 0 | Status: SHIPPED | OUTPATIENT
Start: 2024-09-11 | End: 2024-09-14

## 2024-09-11 RX ORDER — ALBUTEROL SULFATE 0.83 MG/ML
2.5 SOLUTION RESPIRATORY (INHALATION) 4 TIMES DAILY PRN
Qty: 120 EACH | Refills: 0 | Status: SHIPPED | OUTPATIENT
Start: 2024-09-11

## 2024-09-11 RX ADMIN — METHYLPREDNISOLONE SODIUM SUCCINATE 40 MG: 40 INJECTION INTRAMUSCULAR; INTRAVENOUS at 09:12

## 2024-09-11 RX ADMIN — IPRATROPIUM BROMIDE AND ALBUTEROL SULFATE 1 DOSE: 2.5; .5 SOLUTION RESPIRATORY (INHALATION) at 07:33

## 2024-09-11 RX ADMIN — WATER 10 ML: 1 INJECTION INTRAMUSCULAR; INTRAVENOUS; SUBCUTANEOUS at 09:12

## 2024-09-11 NOTE — CARE COORDINATION
SW received voicemail from [facility] advising that they are not in network with her insurance. [employer] is primary.     SW reached out to [facility] and faxed the following information:    Hospital facesheet  H&P with qualifying diagnosis  DME order for a nebulizer.    Respectfully submitted,    Asha CARDOZA, PABLO  [facility]   [phone]    Electronically signed by SONY Guzman, HANNAW on 9/11/2024 at 1:52 PM

## 2024-09-11 NOTE — CARE COORDINATION
Case Management Discharge Note          Date / Time of Note: 9/11/2024 2:24 PM                  Patient Name: Cass Valerio   YOB: 1970  Diagnosis: COPD exacerbation ([facility]) [J44.1]  Acute asthma exacerbation [J45.901]  Acute respiratory failure with hypercapnia (HCC) [J96.02]  Intermittent asthma without complication, unspecified asthma severity [J45.20]   Date / Time: 9/10/2024  8:46 AM    Financial:  Payor: [employer] MEDICARE / Plan: [employer] GOLD PLUS HMO / Product Type: *No Product type* /      Ph[facility]:    [id number] [place], [place] - [address] - P [phone] - F [phone]  [address] [facility] [address]  Phone: [phone] Fax: [phone]    [facility] [id number] [address] - P [phone] - F [phone]  [address] [facility] [address]  Phone: [phone] Fax: [phone]    [id number] [place], [place] - [address] - P [phone] - F [phone]  [address] [facility] [address]  Phone: [phone] Fax: [phone]      Assistance purchasing medications?:    Assistance provided by Case Management: None at this time    DISCHARGE Disposition: Home- No Services Needed    Home Oxygen and Respiratory Equipment:  Oxygen needed at discharge?: No  Nebulizer Company: [employer]  Phone: [phone]  Portable tank available for discharge?: Not Indicated    Transportation:  Transportation PLAN for discharge: friend   Mode of Transport: Private Car  Time of Transport: 1430    IMM Completed:   Yes, Case management has presented and reviewed IMM letter #2.           .   Patient and/or family/POA verbalized understanding of their medicare rights and appeal process if needed. Patient and/or family/POA has signed, initialed and placed the date and time on IMM letter #2 on the the appropriate lines. Copy of letter offered and they are aware that the original copy of [facility] letter #2 is

## 2024-09-11 NOTE — PLAN OF CARE
Problem: Discharge Planning  Goal: Discharge to home or other facility with appropriate resources  9/11/2024 1057 by Abril Aranda, RN  Outcome: Adequate for Discharge  9/10/2024 2115 by Annabel Herrera, RN  Outcome: Progressing     Problem: Pain  Goal: Verbalizes/displays adequate comfort level or baseline comfort level  9/11/2024 1057 by Abril Aranda, RN  Outcome: Adequate for Discharge  9/10/2024 2115 by Annabel Herrera, RN  Outcome: Progressing

## 2024-09-11 NOTE — PROGRESS NOTES
CLINICAL PHARMACY NOTE: MEDS TO BEDS    Total # of Prescriptions Filled: 4   The following medications were delivered to the patient:  Current Discharge Medication List        START taking these medications    Details   predniSONE (DELTASONE) 20 MG tablet Take 2 tablets by mouth daily for 5 doses  Qty: 10 tablet, Refills: 0      levoFLOXacin (LEVAQUIN) 500 MG tablet Take 1 tablet by mouth daily for 3 days  Qty: 3 tablet, Refills: 0      albuterol (PROVENTIL) (2.5 MG/3ML) 0.083% nebulizer solution Take 3 mLs by nebulization 4 times daily as needed for Wheezing  Qty: 120 each, Refills: 0           Albuterol hfa    Additional Documentation:

## 2024-09-11 NOTE — DISCHARGE SUMMARY
Hospital Medicine Discharge Summary    Patient ID: Cass Valerio      Patient's PCP: Wolfgang Caraballo MD    Admit Date: 9/10/2024     Discharge Date:   9/11/24    Admitting Physician: Taran Hoffman MD     Discharge Physician: Stefany Purdy PA-C       Hospital Course: 54-year-old female with past medical history of asthma/COPD who presented to the ED with shortness of breath and cold-like symptoms.  While in the ED, she was placed on 4 L nasal cannula.  CXR showed no acute cardiopulmonary process.  She received 3 rounds of breathing treatments with no significant improvement and still extensive bilateral wheezing.  VBG obtained showed hypercapnia.  She was started on Levaquin, IV steroids and every 4 DuoNebs and she was admitted for further workup/treatment for COPD exacerbation.  Patient significantly improved with IV steroids, she was quickly weaned to room air and remained stable on room air overnight and prior to discharge.  Patient no significant wheezing on exam, states she only had a minimal cough and no shortness of breath.  RVP was obtained which was negative for any respiratory pathogens.  Pneumonia panel was ordered but unable to be obtained due to no sputum production.  Patient stated she felt well and wanted to discharge home.  Patient does not have a nebulizer at home and is currently using her daughters, discussed with  for nebulizer placement.  Patient was also refilled on her inhaler as she states she had not had it recently.  She will discharge on p.o. steroids and complete 3 more days of p.o. Levaquin.  Return precautions were given to patient.  Discussed plan of care with attending Dr. Hoffman who is in agreemen     Acute exacerbation COPD  Acute asthma exacerbation  -Likely viral trigger due cold symptoms prior, RVP negative though  -CXR with no acute cardiopulmonary process  -None of sputum to collect pneumonia panel.  Will cover empirically with 3 more days of

## 2024-09-11 NOTE — CARE COORDINATION
Discharge order noted. SW called Noreen from [facility] an she stated that she will review patient's insurance and let us know if she can fill the nebulizer order.     Respectfully submitted,    Asha CARDOZA, PABLO  [facility]   [phone]    Electronically signed by SONY Guzman, HANNAW on 9/11/2024 at 12:52 PM

## 2024-09-19 ENCOUNTER — HOSPITAL ENCOUNTER (EMERGENCY)
Age: 54
Discharge: HOME | End: 2024-09-19
Attending: EMERGENCY MEDICINE
Payer: MEDICARE

## 2024-09-19 ENCOUNTER — APPOINTMENT (OUTPATIENT)
Dept: GENERAL RADIOLOGY | Age: 54
End: 2024-09-19
Payer: MEDICARE

## 2024-09-19 VITALS
TEMPERATURE: 97.9 F | SYSTOLIC BLOOD PRESSURE: 167 MMHG | HEART RATE: 77 BPM | RESPIRATION RATE: 24 BRPM | DIASTOLIC BLOOD PRESSURE: 99 MMHG | BODY MASS INDEX: 31.09 KG/M2 | WEIGHT: 159.17 LBS | OXYGEN SATURATION: 99 %

## 2024-09-19 DIAGNOSIS — J06.9 VIRAL URI: ICD-10-CM

## 2024-09-19 DIAGNOSIS — E83.42 HYPOMAGNESEMIA: ICD-10-CM

## 2024-09-19 DIAGNOSIS — J44.1 COPD EXACERBATION (HCC): Primary | ICD-10-CM

## 2024-09-19 LAB
ALBUMIN SERPL-MCNC: 4.1 G/DL (ref 3.4–5)
ALBUMIN/GLOB SERPL: 1.6 {RATIO} (ref 1.1–2.2)
ALP SERPL-CCNC: 103 U/L (ref 40–129)
ALT SERPL-CCNC: 18 U/L (ref 10–40)
ANION GAP SERPL CALCULATED.3IONS-SCNC: 9 MMOL/L (ref 3–16)
AST SERPL-CCNC: 22 U/L (ref 15–37)
BASE EXCESS BLDV CALC-SCNC: 0.7 MMOL/L
BASOPHILS # BLD: 0.1 K/UL (ref 0–0.2)
BASOPHILS NFR BLD: 0.9 %
BILIRUB SERPL-MCNC: 0.3 MG/DL (ref 0–1)
BUN SERPL-MCNC: 19 MG/DL (ref 7–20)
CALCIUM SERPL-MCNC: 9.3 MG/DL (ref 8.3–10.6)
CHLORIDE SERPL-SCNC: 105 MMOL/L (ref 99–110)
CO2 BLDV-SCNC: 29 MMOL/L
CO2 SERPL-SCNC: 25 MMOL/L (ref 21–32)
COHGB MFR BLDV: 1 %
CREAT SERPL-MCNC: 0.8 MG/DL (ref 0.6–1.1)
DEPRECATED RDW RBC AUTO: 13.5 % (ref 12.4–15.4)
EKG ATRIAL RATE: 81 BPM
EKG DIAGNOSIS: NORMAL
EKG P AXIS: 69 DEGREES
EKG P-R INTERVAL: 148 MS
EKG Q-T INTERVAL: 384 MS
EKG QRS DURATION: 90 MS
EKG QTC CALCULATION (BAZETT): 446 MS
EKG R AXIS: 23 DEGREES
EKG T AXIS: 47 DEGREES
EKG VENTRICULAR RATE: 81 BPM
EOSINOPHIL # BLD: 0.9 K/UL (ref 0–0.6)
EOSINOPHIL NFR BLD: 15.8 %
FLUAV RNA UPPER RESP QL NAA+PROBE: NEGATIVE
FLUBV AG NPH QL: NEGATIVE
GFR SERPLBLD CREATININE-BSD FMLA CKD-EPI: 86 ML/MIN/{1.73_M2}
GLUCOSE SERPL-MCNC: 92 MG/DL (ref 70–99)
HCO3 BLDV-SCNC: 28 MMOL/L (ref 23–29)
HCT VFR BLD AUTO: 41.8 % (ref 36–48)
HGB BLD-MCNC: 14.1 G/DL (ref 12–16)
LYMPHOCYTES # BLD: 1.4 K/UL (ref 1–5.1)
LYMPHOCYTES NFR BLD: 23.6 %
MAGNESIUM SERPL-MCNC: 1.79 MG/DL (ref 1.8–2.4)
MCH RBC QN AUTO: 28.2 PG (ref 26–34)
MCHC RBC AUTO-ENTMCNC: 33.7 G/DL (ref 31–36)
MCV RBC AUTO: 83.6 FL (ref 80–100)
METHGB MFR BLDV: 0.3 %
MONOCYTES # BLD: 0.5 K/UL (ref 0–1.3)
MONOCYTES NFR BLD: 8.5 %
NEUTROPHILS # BLD: 3 K/UL (ref 1.7–7.7)
NEUTROPHILS NFR BLD: 51.2 %
NT-PROBNP SERPL-MCNC: 222 PG/ML (ref 0–124)
O2 THERAPY: ABNORMAL
PCO2 BLDV: 51.6 MMHG (ref 40–50)
PH BLDV: 7.33 [PH] (ref 7.35–7.45)
PLATELET # BLD AUTO: 239 K/UL (ref 135–450)
PMV BLD AUTO: 7.1 FL (ref 5–10.5)
PO2 BLDV: <30 MMHG
POTASSIUM SERPL-SCNC: 3.5 MMOL/L (ref 3.5–5.1)
PROT SERPL-MCNC: 6.6 G/DL (ref 6.4–8.2)
RBC # BLD AUTO: 5 M/UL (ref 4–5.2)
SAO2 % BLDV: 47 %
SARS-COV-2 RDRP RESP QL NAA+PROBE: NOT DETECTED
SODIUM SERPL-SCNC: 139 MMOL/L (ref 136–145)
TROPONIN, HIGH SENSITIVITY: <6 NG/L (ref 0–14)
WBC # BLD AUTO: 5.9 K/UL (ref 4–11)

## 2024-09-19 PROCEDURE — 83880 ASSAY OF NATRIURETIC PEPTIDE: CPT

## 2024-09-19 PROCEDURE — 96375 TX/PRO/DX INJ NEW DRUG ADDON: CPT

## 2024-09-19 PROCEDURE — 6360000002 HC RX W HCPCS: Performed by: GENERAL ACUTE CARE HOSPITAL

## 2024-09-19 PROCEDURE — 2700000000 HC OXYGEN THERAPY PER DAY

## 2024-09-19 PROCEDURE — 84484 ASSAY OF TROPONIN QUANT: CPT

## 2024-09-19 PROCEDURE — 99285 EMERGENCY DEPT VISIT HI MDM: CPT

## 2024-09-19 PROCEDURE — 6370000000 HC RX 637 (ALT 250 FOR IP): Performed by: GENERAL ACUTE CARE HOSPITAL

## 2024-09-19 PROCEDURE — 87635 SARS-COV-2 COVID-19 AMP PRB: CPT

## 2024-09-19 PROCEDURE — 96366 THER/PROPH/DIAG IV INF ADDON: CPT

## 2024-09-19 PROCEDURE — 96365 THER/PROPH/DIAG IV INF INIT: CPT

## 2024-09-19 PROCEDURE — 83735 ASSAY OF MAGNESIUM: CPT

## 2024-09-19 PROCEDURE — 71045 X-RAY EXAM CHEST 1 VIEW: CPT

## 2024-09-19 PROCEDURE — 82803 BLOOD GASES ANY COMBINATION: CPT

## 2024-09-19 PROCEDURE — 93010 ELECTROCARDIOGRAM REPORT: CPT | Performed by: INTERNAL MEDICINE

## 2024-09-19 PROCEDURE — 80053 COMPREHEN METABOLIC PANEL: CPT

## 2024-09-19 PROCEDURE — 94761 N-INVAS EAR/PLS OXIMETRY MLT: CPT

## 2024-09-19 PROCEDURE — 93005 ELECTROCARDIOGRAM TRACING: CPT | Performed by: EMERGENCY MEDICINE

## 2024-09-19 PROCEDURE — 87804 INFLUENZA ASSAY W/OPTIC: CPT

## 2024-09-19 PROCEDURE — 94640 AIRWAY INHALATION TREATMENT: CPT

## 2024-09-19 PROCEDURE — 85025 COMPLETE CBC W/AUTO DIFF WBC: CPT

## 2024-09-19 RX ORDER — DEXAMETHASONE SODIUM PHOSPHATE 4 MG/ML
10 INJECTION, SOLUTION INTRA-ARTICULAR; INTRALESIONAL; INTRAMUSCULAR; INTRAVENOUS; SOFT TISSUE ONCE
Status: COMPLETED | OUTPATIENT
Start: 2024-09-19 | End: 2024-09-19

## 2024-09-19 RX ORDER — IPRATROPIUM BROMIDE AND ALBUTEROL SULFATE 2.5; .5 MG/3ML; MG/3ML
1 SOLUTION RESPIRATORY (INHALATION) ONCE
Status: COMPLETED | OUTPATIENT
Start: 2024-09-19 | End: 2024-09-19

## 2024-09-19 RX ORDER — DEXTROMETHORPHAN HYDROBROMIDE AND PROMETHAZINE HYDROCHLORIDE 15; 6.25 MG/5ML; MG/5ML
5 SYRUP ORAL 4 TIMES DAILY PRN
Qty: 118 ML | Refills: 0 | Status: SHIPPED | OUTPATIENT
Start: 2024-09-19 | End: 2024-09-26

## 2024-09-19 RX ORDER — ALBUTEROL SULFATE 90 UG/1
2 INHALANT RESPIRATORY (INHALATION) 4 TIMES DAILY PRN
Qty: 18 G | Refills: 0 | Status: SHIPPED | OUTPATIENT
Start: 2024-09-19

## 2024-09-19 RX ORDER — LANOLIN ALCOHOL/MO/W.PET/CERES
400 CREAM (GRAM) TOPICAL ONCE
Status: COMPLETED | OUTPATIENT
Start: 2024-09-19 | End: 2024-09-19

## 2024-09-19 RX ORDER — PREDNISONE 50 MG/1
50 TABLET ORAL DAILY
Qty: 4 TABLET | Refills: 0 | Status: SHIPPED | OUTPATIENT
Start: 2024-09-19 | End: 2024-09-23

## 2024-09-19 RX ADMIN — Medication 12.5 MG: at 13:06

## 2024-09-19 RX ADMIN — DEXAMETHASONE SODIUM PHOSPHATE 10 MG: 4 INJECTION, SOLUTION INTRAMUSCULAR; INTRAVENOUS at 12:37

## 2024-09-19 RX ADMIN — IPRATROPIUM BROMIDE AND ALBUTEROL SULFATE 1 DOSE: .5; 2.5 SOLUTION RESPIRATORY (INHALATION) at 12:01

## 2024-09-19 RX ADMIN — Medication 400 MG: at 14:51

## 2024-09-19 ASSESSMENT — ENCOUNTER SYMPTOMS
ABDOMINAL PAIN: 0
COUGH: 0
EYE PAIN: 0
SHORTNESS OF BREATH: 1
BACK PAIN: 0
WHEEZING: 1
NAUSEA: 0
VOMITING: 0
SORE THROAT: 0

## 2024-09-19 ASSESSMENT — PAIN DESCRIPTION - LOCATION: LOCATION: HEAD

## 2024-09-19 ASSESSMENT — PAIN SCALES - GENERAL: PAINLEVEL_OUTOF10: 8

## 2024-09-24 ASSESSMENT — ENCOUNTER SYMPTOMS
VOICE CHANGE: 0
VOMITING: 0
SORE THROAT: 0
DIARRHEA: 0
SHORTNESS OF BREATH: 1
CHEST TIGHTNESS: 0
ABDOMINAL PAIN: 0
BACK PAIN: 0
BLOOD IN STOOL: 0
WHEEZING: 1
CONSTIPATION: 0
NAUSEA: 1
COUGH: 1

## 2025-02-10 ENCOUNTER — APPOINTMENT (OUTPATIENT)
Dept: GENERAL RADIOLOGY | Age: 55
DRG: 193 | End: 2025-02-10
Payer: MEDICARE

## 2025-02-10 ENCOUNTER — HOSPITAL ENCOUNTER (INPATIENT)
Age: 55
LOS: 1 days | Discharge: HOME | DRG: 193 | End: 2025-02-12
Attending: STUDENT IN AN ORGANIZED HEALTH CARE EDUCATION/TRAINING PROGRAM
Payer: MEDICARE

## 2025-02-10 DIAGNOSIS — S42.294A OTHER CLOSED NONDISPLACED FRACTURE OF PROXIMAL END OF RIGHT HUMERUS, INITIAL ENCOUNTER: Primary | ICD-10-CM

## 2025-02-10 DIAGNOSIS — J45.901 SEVERE ASTHMA WITH EXACERBATION, UNSPECIFIED WHETHER PERSISTENT: ICD-10-CM

## 2025-02-10 DIAGNOSIS — J96.01 ACUTE RESPIRATORY FAILURE WITH HYPOXIA (HCC): ICD-10-CM

## 2025-02-10 LAB
BASE EXCESS BLDV CALC-SCNC: -4.5 MMOL/L
CO2 BLDV-SCNC: 29 MMOL/L
COHGB MFR BLDV: 1.1 %
HCO3 BLDV-SCNC: 27 MMOL/L (ref 23–29)
METHGB MFR BLDV: 0.5 %
O2 THERAPY: ABNORMAL
PCO2 BLDV: 80.1 MMHG (ref 40–50)
PH BLDV: 7.13 [PH] (ref 7.35–7.45)
PO2 BLDV: 79 MMHG
SAO2 % BLDV: 91 %

## 2025-02-10 PROCEDURE — 6360000002 HC RX W HCPCS: Performed by: STUDENT IN AN ORGANIZED HEALTH CARE EDUCATION/TRAINING PROGRAM

## 2025-02-10 PROCEDURE — 94760 N-INVAS EAR/PLS OXIMETRY 1: CPT

## 2025-02-10 PROCEDURE — 80053 COMPREHEN METABOLIC PANEL: CPT

## 2025-02-10 PROCEDURE — 96375 TX/PRO/DX INJ NEW DRUG ADDON: CPT

## 2025-02-10 PROCEDURE — 93005 ELECTROCARDIOGRAM TRACING: CPT | Performed by: STUDENT IN AN ORGANIZED HEALTH CARE EDUCATION/TRAINING PROGRAM

## 2025-02-10 PROCEDURE — 99285 EMERGENCY DEPT VISIT HI MDM: CPT

## 2025-02-10 PROCEDURE — 85025 COMPLETE CBC W/AUTO DIFF WBC: CPT

## 2025-02-10 PROCEDURE — 83605 ASSAY OF LACTIC ACID: CPT

## 2025-02-10 PROCEDURE — 94640 AIRWAY INHALATION TREATMENT: CPT

## 2025-02-10 PROCEDURE — 71045 X-RAY EXAM CHEST 1 VIEW: CPT

## 2025-02-10 PROCEDURE — 84484 ASSAY OF TROPONIN QUANT: CPT

## 2025-02-10 PROCEDURE — 96365 THER/PROPH/DIAG IV INF INIT: CPT

## 2025-02-10 PROCEDURE — 85610 PROTHROMBIN TIME: CPT

## 2025-02-10 PROCEDURE — 6370000000 HC RX 637 (ALT 250 FOR IP): Performed by: STUDENT IN AN ORGANIZED HEALTH CARE EDUCATION/TRAINING PROGRAM

## 2025-02-10 PROCEDURE — 85379 FIBRIN DEGRADATION QUANT: CPT

## 2025-02-10 PROCEDURE — 84145 PROCALCITONIN (PCT): CPT

## 2025-02-10 PROCEDURE — 71046 X-RAY EXAM CHEST 2 VIEWS: CPT

## 2025-02-10 PROCEDURE — 82803 BLOOD GASES ANY COMBINATION: CPT

## 2025-02-10 PROCEDURE — 51702 INSERT TEMP BLADDER CATH: CPT

## 2025-02-10 PROCEDURE — 83880 ASSAY OF NATRIURETIC PEPTIDE: CPT

## 2025-02-10 PROCEDURE — 2700000000 HC OXYGEN THERAPY PER DAY

## 2025-02-10 PROCEDURE — 87635 SARS-COV-2 COVID-19 AMP PRB: CPT

## 2025-02-10 RX ORDER — IPRATROPIUM BROMIDE AND ALBUTEROL SULFATE 2.5; .5 MG/3ML; MG/3ML
3 SOLUTION RESPIRATORY (INHALATION) ONCE
Status: COMPLETED | OUTPATIENT
Start: 2025-02-10 | End: 2025-02-10

## 2025-02-10 RX ORDER — METHYLPREDNISOLONE SODIUM SUCCINATE 125 MG/2ML
125 INJECTION INTRAMUSCULAR; INTRAVENOUS ONCE
Status: COMPLETED | OUTPATIENT
Start: 2025-02-10 | End: 2025-02-10

## 2025-02-10 RX ORDER — MAGNESIUM SULFATE IN WATER 40 MG/ML
2000 INJECTION, SOLUTION INTRAVENOUS ONCE
Status: COMPLETED | OUTPATIENT
Start: 2025-02-10 | End: 2025-02-11

## 2025-02-10 RX ORDER — FUROSEMIDE 10 MG/ML
40 INJECTION INTRAMUSCULAR; INTRAVENOUS ONCE
Status: COMPLETED | OUTPATIENT
Start: 2025-02-10 | End: 2025-02-11

## 2025-02-10 RX ADMIN — METHYLPREDNISOLONE SODIUM SUCCINATE 125 MG: 125 INJECTION INTRAMUSCULAR; INTRAVENOUS at 23:30

## 2025-02-10 RX ADMIN — MAGNESIUM SULFATE HEPTAHYDRATE 2000 MG: 40 INJECTION, SOLUTION INTRAVENOUS at 23:28

## 2025-02-10 RX ADMIN — IPRATROPIUM BROMIDE AND ALBUTEROL SULFATE 3 DOSE: .5; 3 SOLUTION RESPIRATORY (INHALATION) at 23:25

## 2025-02-11 ENCOUNTER — APPOINTMENT (OUTPATIENT)
Dept: CT IMAGING | Age: 55
DRG: 193 | End: 2025-02-11
Payer: MEDICARE

## 2025-02-11 ENCOUNTER — APPOINTMENT (OUTPATIENT)
Dept: GENERAL RADIOLOGY | Age: 55
DRG: 193 | End: 2025-02-11
Payer: MEDICARE

## 2025-02-11 LAB
ALBUMIN SERPL-MCNC: 4.4 G/DL (ref 3.4–5)
ALBUMIN/GLOB SERPL: 1.8 {RATIO} (ref 1.1–2.2)
ALP SERPL-CCNC: 118 U/L (ref 40–129)
ALT SERPL-CCNC: 26 U/L (ref 10–40)
AMPHETAMINES UR QL SCN>1000 NG/ML: POSITIVE
ANION GAP SERPL CALCULATED.3IONS-SCNC: 11 MMOL/L (ref 3–16)
AST SERPL-CCNC: 29 U/L (ref 15–37)
BARBITURATES UR QL SCN>200 NG/ML: ABNORMAL
BASE EXCESS BLDV CALC-SCNC: -0.3 MMOL/L
BASOPHILS # BLD: 0.1 K/UL (ref 0–0.2)
BASOPHILS NFR BLD: 1.2 %
BENZODIAZ UR QL SCN>200 NG/ML: POSITIVE
BILIRUB SERPL-MCNC: 0.3 MG/DL (ref 0–1)
BUN SERPL-MCNC: 28 MG/DL (ref 7–20)
CALCIUM SERPL-MCNC: 8.6 MG/DL (ref 8.3–10.6)
CANNABINOIDS UR QL SCN>50 NG/ML: ABNORMAL
CHLORIDE SERPL-SCNC: 106 MMOL/L (ref 99–110)
CO2 BLDV-SCNC: 28 MMOL/L
CO2 SERPL-SCNC: 23 MMOL/L (ref 21–32)
COCAINE UR QL SCN: ABNORMAL
COHGB MFR BLDV: 0.8 %
CREAT SERPL-MCNC: 0.9 MG/DL (ref 0.6–1.1)
D-DIMER QUANTITATIVE: 0.72 UG/ML FEU (ref 0–0.6)
DEPRECATED RDW RBC AUTO: 14 % (ref 12.4–15.4)
DRUG SCREEN COMMENT UR-IMP: ABNORMAL
EKG ATRIAL RATE: 105 BPM
EKG DIAGNOSIS: NORMAL
EKG P AXIS: 81 DEGREES
EKG P-R INTERVAL: 156 MS
EKG Q-T INTERVAL: 370 MS
EKG QRS DURATION: 96 MS
EKG QTC CALCULATION (BAZETT): 489 MS
EKG R AXIS: 49 DEGREES
EKG T AXIS: 58 DEGREES
EKG VENTRICULAR RATE: 105 BPM
EOSINOPHIL # BLD: 2.4 K/UL (ref 0–0.6)
EOSINOPHIL NFR BLD: 21.3 %
FENTANYL SCREEN, URINE: ABNORMAL
FLUAV + FLUBV AG NOSE IA.RAPID: NOT DETECTED
FLUAV + FLUBV AG NOSE IA.RAPID: NOT DETECTED
GFR SERPLBLD CREATININE-BSD FMLA CKD-EPI: 75 ML/MIN/{1.73_M2}
GLUCOSE SERPL-MCNC: 185 MG/DL (ref 70–99)
HCO3 BLDV-SCNC: 27 MMOL/L (ref 23–29)
HCT VFR BLD AUTO: 41.7 % (ref 36–48)
HGB BLD-MCNC: 13.9 G/DL (ref 12–16)
INR PPP: 0.99 (ref 0.85–1.15)
LACTATE BLDV-SCNC: 0.7 MMOL/L (ref 0.4–1.9)
LYMPHOCYTES # BLD: 3.4 K/UL (ref 1–5.1)
LYMPHOCYTES NFR BLD: 30.9 %
MCH RBC QN AUTO: 28.2 PG (ref 26–34)
MCHC RBC AUTO-ENTMCNC: 33.3 G/DL (ref 31–36)
MCV RBC AUTO: 84.7 FL (ref 80–100)
METHADONE UR QL SCN>300 NG/ML: ABNORMAL
METHGB MFR BLDV: 0.7 %
MONOCYTES # BLD: 1 K/UL (ref 0–1.3)
MONOCYTES NFR BLD: 8.7 %
NEUTROPHILS # BLD: 4.2 K/UL (ref 1.7–7.7)
NEUTROPHILS NFR BLD: 37.9 %
NT-PROBNP SERPL-MCNC: <36 PG/ML (ref 0–124)
O2 THERAPY: ABNORMAL
OPIATES UR QL SCN>300 NG/ML: ABNORMAL
OXYCODONE UR QL SCN: ABNORMAL
PATH INTERP BLD-IMP: NO
PCO2 BLDV: 52.6 MMHG (ref 40–50)
PCP UR QL SCN>25 NG/ML: ABNORMAL
PH BLDV: 7.32 [PH] (ref 7.35–7.45)
PH UR STRIP: 5 [PH]
PLATELET # BLD AUTO: 406 K/UL (ref 135–450)
PMV BLD AUTO: 7.1 FL (ref 5–10.5)
PO2 BLDV: 51 MMHG
POTASSIUM SERPL-SCNC: 3.9 MMOL/L (ref 3.5–5.1)
PROCALCITONIN SERPL IA-MCNC: 0.04 NG/ML (ref 0–0.15)
PROT SERPL-MCNC: 6.9 G/DL (ref 6.4–8.2)
PROTHROMBIN TIME: 13.3 SEC (ref 11.9–14.9)
RBC # BLD AUTO: 4.92 M/UL (ref 4–5.2)
REASON FOR REJECTION: NORMAL
REJECTED TEST: NORMAL
SAO2 % BLDV: 84 %
SARS-COV-2 RDRP RESP QL NAA+PROBE: NOT DETECTED
SODIUM SERPL-SCNC: 140 MMOL/L (ref 136–145)
TROPONIN, HIGH SENSITIVITY: 7 NG/L (ref 0–14)
WBC # BLD AUTO: 11.1 K/UL (ref 4–11)

## 2025-02-11 PROCEDURE — 82803 BLOOD GASES ANY COMBINATION: CPT

## 2025-02-11 PROCEDURE — 73030 X-RAY EXAM OF SHOULDER: CPT

## 2025-02-11 PROCEDURE — 6370000000 HC RX 637 (ALT 250 FOR IP): Performed by: HOSPITALIST

## 2025-02-11 PROCEDURE — 80307 DRUG TEST PRSMV CHEM ANLYZR: CPT

## 2025-02-11 PROCEDURE — 96375 TX/PRO/DX INJ NEW DRUG ADDON: CPT

## 2025-02-11 PROCEDURE — 99223 1ST HOSP IP/OBS HIGH 75: CPT | Performed by: INTERNAL MEDICINE

## 2025-02-11 PROCEDURE — 2500000003 HC RX 250 WO HCPCS

## 2025-02-11 PROCEDURE — 96374 THER/PROPH/DIAG INJ IV PUSH: CPT

## 2025-02-11 PROCEDURE — 71260 CT THORAX DX C+: CPT

## 2025-02-11 PROCEDURE — 6360000004 HC RX CONTRAST MEDICATION: Performed by: STUDENT IN AN ORGANIZED HEALTH CARE EDUCATION/TRAINING PROGRAM

## 2025-02-11 PROCEDURE — 6360000002 HC RX W HCPCS: Performed by: HOSPITALIST

## 2025-02-11 PROCEDURE — 94760 N-INVAS EAR/PLS OXIMETRY 1: CPT

## 2025-02-11 PROCEDURE — 6370000000 HC RX 637 (ALT 250 FOR IP)

## 2025-02-11 PROCEDURE — 6370000000 HC RX 637 (ALT 250 FOR IP): Performed by: INTERNAL MEDICINE

## 2025-02-11 PROCEDURE — 1200000000 HC SEMI PRIVATE

## 2025-02-11 PROCEDURE — 93010 ELECTROCARDIOGRAM REPORT: CPT | Performed by: INTERNAL MEDICINE

## 2025-02-11 PROCEDURE — 87502 INFLUENZA DNA AMP PROBE: CPT

## 2025-02-11 PROCEDURE — 96366 THER/PROPH/DIAG IV INF ADDON: CPT

## 2025-02-11 PROCEDURE — 6360000002 HC RX W HCPCS: Performed by: STUDENT IN AN ORGANIZED HEALTH CARE EDUCATION/TRAINING PROGRAM

## 2025-02-11 PROCEDURE — 94640 AIRWAY INHALATION TREATMENT: CPT

## 2025-02-11 PROCEDURE — 6360000002 HC RX W HCPCS

## 2025-02-11 PROCEDURE — 2580000003 HC RX 258

## 2025-02-11 RX ORDER — METHYLPREDNISOLONE SODIUM SUCCINATE 40 MG/ML
40 INJECTION INTRAMUSCULAR; INTRAVENOUS EVERY 12 HOURS
Status: DISCONTINUED | OUTPATIENT
Start: 2025-02-11 | End: 2025-02-12 | Stop reason: HOSPADM

## 2025-02-11 RX ORDER — BENZONATATE 100 MG/1
100 CAPSULE ORAL 3 TIMES DAILY PRN
Status: DISCONTINUED | OUTPATIENT
Start: 2025-02-11 | End: 2025-02-12 | Stop reason: HOSPADM

## 2025-02-11 RX ORDER — ENOXAPARIN SODIUM 100 MG/ML
40 INJECTION SUBCUTANEOUS DAILY
Status: DISCONTINUED | OUTPATIENT
Start: 2025-02-11 | End: 2025-02-12 | Stop reason: HOSPADM

## 2025-02-11 RX ORDER — POLYETHYLENE GLYCOL 3350 17 G/17G
17 POWDER, FOR SOLUTION ORAL DAILY PRN
Status: DISCONTINUED | OUTPATIENT
Start: 2025-02-11 | End: 2025-02-12 | Stop reason: HOSPADM

## 2025-02-11 RX ORDER — ACETAMINOPHEN 650 MG/1
650 SUPPOSITORY RECTAL EVERY 6 HOURS PRN
Status: DISCONTINUED | OUTPATIENT
Start: 2025-02-11 | End: 2025-02-12 | Stop reason: HOSPADM

## 2025-02-11 RX ORDER — IOPAMIDOL 755 MG/ML
75 INJECTION, SOLUTION INTRAVASCULAR
Status: COMPLETED | OUTPATIENT
Start: 2025-02-11 | End: 2025-02-11

## 2025-02-11 RX ORDER — MAGNESIUM SULFATE IN WATER 40 MG/ML
2000 INJECTION, SOLUTION INTRAVENOUS PRN
Status: DISCONTINUED | OUTPATIENT
Start: 2025-02-11 | End: 2025-02-12 | Stop reason: HOSPADM

## 2025-02-11 RX ORDER — SODIUM CHLORIDE 0.9 % (FLUSH) 0.9 %
5-40 SYRINGE (ML) INJECTION PRN
Status: DISCONTINUED | OUTPATIENT
Start: 2025-02-11 | End: 2025-02-12 | Stop reason: HOSPADM

## 2025-02-11 RX ORDER — BUDESONIDE AND FORMOTEROL FUMARATE DIHYDRATE 160; 4.5 UG/1; UG/1
2 AEROSOL RESPIRATORY (INHALATION)
Status: DISCONTINUED | OUTPATIENT
Start: 2025-02-11 | End: 2025-02-12 | Stop reason: HOSPADM

## 2025-02-11 RX ORDER — ALBUTEROL SULFATE 90 UG/1
2 INHALANT RESPIRATORY (INHALATION) EVERY 4 HOURS PRN
Status: DISCONTINUED | OUTPATIENT
Start: 2025-02-11 | End: 2025-02-12 | Stop reason: HOSPADM

## 2025-02-11 RX ORDER — SODIUM CHLORIDE 9 MG/ML
INJECTION, SOLUTION INTRAVENOUS PRN
Status: DISCONTINUED | OUTPATIENT
Start: 2025-02-11 | End: 2025-02-12 | Stop reason: HOSPADM

## 2025-02-11 RX ORDER — OXYCODONE AND ACETAMINOPHEN 5; 325 MG/1; MG/1
1 TABLET ORAL EVERY 4 HOURS PRN
Status: DISCONTINUED | OUTPATIENT
Start: 2025-02-11 | End: 2025-02-12 | Stop reason: HOSPADM

## 2025-02-11 RX ORDER — IPRATROPIUM BROMIDE AND ALBUTEROL SULFATE 2.5; .5 MG/3ML; MG/3ML
1 SOLUTION RESPIRATORY (INHALATION)
Status: DISCONTINUED | OUTPATIENT
Start: 2025-02-11 | End: 2025-02-12 | Stop reason: HOSPADM

## 2025-02-11 RX ORDER — ONDANSETRON 2 MG/ML
4 INJECTION INTRAMUSCULAR; INTRAVENOUS EVERY 6 HOURS PRN
Status: DISCONTINUED | OUTPATIENT
Start: 2025-02-11 | End: 2025-02-12 | Stop reason: HOSPADM

## 2025-02-11 RX ORDER — ACETAMINOPHEN 325 MG/1
650 TABLET ORAL EVERY 6 HOURS PRN
Status: DISCONTINUED | OUTPATIENT
Start: 2025-02-11 | End: 2025-02-12 | Stop reason: HOSPADM

## 2025-02-11 RX ORDER — ONDANSETRON 4 MG/1
4 TABLET, ORALLY DISINTEGRATING ORAL EVERY 8 HOURS PRN
Status: DISCONTINUED | OUTPATIENT
Start: 2025-02-11 | End: 2025-02-12 | Stop reason: HOSPADM

## 2025-02-11 RX ORDER — OXYMETAZOLINE HYDROCHLORIDE 0.05 G/100ML
2 SPRAY NASAL 2 TIMES DAILY
Status: DISCONTINUED | OUTPATIENT
Start: 2025-02-11 | End: 2025-02-12 | Stop reason: HOSPADM

## 2025-02-11 RX ORDER — SODIUM CHLORIDE 0.9 % (FLUSH) 0.9 %
5-40 SYRINGE (ML) INJECTION EVERY 12 HOURS SCHEDULED
Status: DISCONTINUED | OUTPATIENT
Start: 2025-02-11 | End: 2025-02-12 | Stop reason: HOSPADM

## 2025-02-11 RX ORDER — KETOROLAC TROMETHAMINE 15 MG/ML
15 INJECTION, SOLUTION INTRAMUSCULAR; INTRAVENOUS ONCE
Status: COMPLETED | OUTPATIENT
Start: 2025-02-11 | End: 2025-02-11

## 2025-02-11 RX ORDER — WATER 10 ML/10ML
INJECTION INTRAMUSCULAR; INTRAVENOUS; SUBCUTANEOUS
Status: COMPLETED
Start: 2025-02-11 | End: 2025-02-11

## 2025-02-11 RX ORDER — METHYLPREDNISOLONE SODIUM SUCCINATE 40 MG/ML
40 INJECTION INTRAMUSCULAR; INTRAVENOUS DAILY
Status: DISCONTINUED | OUTPATIENT
Start: 2025-02-11 | End: 2025-02-11

## 2025-02-11 RX ORDER — POTASSIUM CHLORIDE 1500 MG/1
40 TABLET, EXTENDED RELEASE ORAL PRN
Status: DISCONTINUED | OUTPATIENT
Start: 2025-02-11 | End: 2025-02-12 | Stop reason: HOSPADM

## 2025-02-11 RX ORDER — POTASSIUM CHLORIDE 7.45 MG/ML
10 INJECTION INTRAVENOUS PRN
Status: DISCONTINUED | OUTPATIENT
Start: 2025-02-11 | End: 2025-02-12 | Stop reason: HOSPADM

## 2025-02-11 RX ADMIN — METHYLPREDNISOLONE SODIUM SUCCINATE 40 MG: 40 INJECTION INTRAMUSCULAR; INTRAVENOUS at 20:42

## 2025-02-11 RX ADMIN — AZITHROMYCIN MONOHYDRATE 500 MG: 500 INJECTION, POWDER, LYOPHILIZED, FOR SOLUTION INTRAVENOUS at 06:17

## 2025-02-11 RX ADMIN — BUDESONIDE AND FORMOTEROL FUMARATE DIHYDRATE 2 PUFF: 160; 4.5 AEROSOL RESPIRATORY (INHALATION) at 20:14

## 2025-02-11 RX ADMIN — WATER: 1 INJECTION INTRAMUSCULAR; INTRAVENOUS; SUBCUTANEOUS at 21:10

## 2025-02-11 RX ADMIN — SODIUM CHLORIDE, PRESERVATIVE FREE 10 ML: 5 INJECTION INTRAVENOUS at 20:26

## 2025-02-11 RX ADMIN — KETOROLAC TROMETHAMINE 15 MG: 15 INJECTION, SOLUTION INTRAMUSCULAR; INTRAVENOUS at 02:31

## 2025-02-11 RX ADMIN — OXYCODONE HYDROCHLORIDE AND ACETAMINOPHEN 1 TABLET: 5; 325 TABLET ORAL at 17:32

## 2025-02-11 RX ADMIN — OXYCODONE HYDROCHLORIDE AND ACETAMINOPHEN 1 TABLET: 5; 325 TABLET ORAL at 21:29

## 2025-02-11 RX ADMIN — IPRATROPIUM BROMIDE AND ALBUTEROL SULFATE 1 DOSE: 2.5; .5 SOLUTION RESPIRATORY (INHALATION) at 12:55

## 2025-02-11 RX ADMIN — OXYMETAZOLINE HYDROCHLORIDE 2 SPRAY: 0.5 SPRAY NASAL at 13:18

## 2025-02-11 RX ADMIN — OXYMETAZOLINE HYDROCHLORIDE 2 SPRAY: 0.5 SPRAY NASAL at 20:26

## 2025-02-11 RX ADMIN — GUAIFENESIN, DEXTROMETHORPHAN HBR 1 TABLET: 600; 30 TABLET ORAL at 20:42

## 2025-02-11 RX ADMIN — IPRATROPIUM BROMIDE AND ALBUTEROL SULFATE 1 DOSE: 2.5; .5 SOLUTION RESPIRATORY (INHALATION) at 15:21

## 2025-02-11 RX ADMIN — ENOXAPARIN SODIUM 40 MG: 100 INJECTION SUBCUTANEOUS at 10:35

## 2025-02-11 RX ADMIN — FUROSEMIDE 40 MG: 10 INJECTION, SOLUTION INTRAMUSCULAR; INTRAVENOUS at 00:33

## 2025-02-11 RX ADMIN — METHYLPREDNISOLONE SODIUM SUCCINATE 40 MG: 40 INJECTION INTRAMUSCULAR; INTRAVENOUS at 10:34

## 2025-02-11 RX ADMIN — WATER 1000 MG: 1 INJECTION INTRAMUSCULAR; INTRAVENOUS; SUBCUTANEOUS at 06:14

## 2025-02-11 RX ADMIN — IPRATROPIUM BROMIDE AND ALBUTEROL SULFATE 1 DOSE: 2.5; .5 SOLUTION RESPIRATORY (INHALATION) at 20:14

## 2025-02-11 RX ADMIN — IOPAMIDOL 75 ML: 755 INJECTION, SOLUTION INTRAVENOUS at 02:05

## 2025-02-11 RX ADMIN — BENZONATATE 100 MG: 100 CAPSULE ORAL at 17:16

## 2025-02-11 RX ADMIN — IPRATROPIUM BROMIDE AND ALBUTEROL SULFATE 1 DOSE: 2.5; .5 SOLUTION RESPIRATORY (INHALATION) at 08:40

## 2025-02-11 ASSESSMENT — PAIN SCALES - GENERAL
PAINLEVEL_OUTOF10: 5
PAINLEVEL_OUTOF10: 6
PAINLEVEL_OUTOF10: 5
PAINLEVEL_OUTOF10: 6
PAINLEVEL_OUTOF10: 0

## 2025-02-11 ASSESSMENT — PAIN DESCRIPTION - FREQUENCY: FREQUENCY: INTERMITTENT

## 2025-02-11 ASSESSMENT — PAIN DESCRIPTION - DESCRIPTORS: DESCRIPTORS: ACHING;DISCOMFORT

## 2025-02-11 ASSESSMENT — PAIN DESCRIPTION - PAIN TYPE
TYPE: ACUTE PAIN
TYPE: ACUTE PAIN

## 2025-02-11 ASSESSMENT — PAIN DESCRIPTION - LOCATION
LOCATION: SHOULDER;ABDOMEN
LOCATION: SHOULDER;LEG

## 2025-02-11 ASSESSMENT — PAIN DESCRIPTION - ORIENTATION: ORIENTATION: RIGHT;LEFT

## 2025-02-11 NOTE — ED NOTES
Pt not wearing oxygen, maintaining  on room air, stood at the side of the bed to change bedding independently tolerated well. Updated on status, denies any needs at this time

## 2025-02-11 NOTE — CONSULTS
Pulmonary Consult Note     Patient's name:  Cass Valerio  Medical Record Number: [id number]  Patient's account/billing number: [id number]  Patient's YOB: 1970  Age: 54 y.o.  Date of Admission: 2/10/2025 11:15 PM  Date of Consult: 2025      Primary Care Physician: Wolfgang Caraballo MD      Code Status: Full Code    Reason for consult: copd/asthma exacerbation     Assessment and Plan     COPD/asthma with acute exacerbation  Acute respiratory with hypoxia and hypercapnia  Substance abuse      Plan:  Solu-Medrol daily  DuoNeb and Symbicort  Agree with current antibiotics  Aspiration precautions.      HISTORY OF PRESENT ILLNESS:   Mr./Ms. Cass Valerio is a 54 y.o. lady with past medical history stated below significant for asthma/COPD, tobacco abuse, substance abuse, presented to the hospital with somnolence shortness of breath cough and wheezing she was satting 69% on room air on presentation, she was significantly hypercapnic on presentation thickening and groundglass opacity or consolidations.            Past Medical History:        Diagnosis Date    Anxiety and depression     Arthritis     Asthma     Chronic pain syndrome     Complication of joint prosthesis (HCC)     COPD (chronic obstructive pulmonary disease) (HCC)     Failed back surgical syndrome     Failed neck syndrome     Fibromyalgia     GERD (gastroesophageal reflux disease)     Influenza A 2019    MDRO (multiple drug resistant organisms) resistance 2018    sputum    Obesity (BMI 30-39.9)     Pneumonia        Past Surgical History:        Procedure Laterality Date    ABDOMEN SURGERY      BACK SURGERY       SECTION      x2    ENDOSCOPY, COLON, DIAGNOSTIC      HERNIA REPAIR      JOINT REPLACEMENT      right knee    KNEE ARTHROCENTESIS      removal of bilateral knees    SPINE SURGERY      5  back surgeries     TOTAL HIP ARTHROPLASTY Right 2023    RIGHT TOTAL  02/10/25    XR CHEST PORTABLE (Preliminary)  This result has not been signed. Information might be incomplete.    Narrative  EXAMINATION:  ONE XRAY VIEW OF THE CHEST    2/10/2025 11:38 pm    COMPARISON:  September 19, 2024    HISTORY:  ORDERING SYSTEM PROVIDED HISTORY: hypoxia  TECHNOLOGIST PROVIDED HISTORY:  Reason for exam:->hypoxia  Reason for Exam: hypoxia    FINDINGS:  Stable right chest wall MediPort.  The cardiomediastinal silhouette is  stable.  The lungs show no focal consolidation or significant pleural  effusions.  No pulmonary edema.  No pneumothorax.  Emphysema is present.    No acute osseous abnormality.    Impression  1. No acute cardiopulmonary disease.                     Neto Brenner MD, MShefaliD.            2/11/2025, 10:48 AM

## 2025-02-11 NOTE — PROGRESS NOTES
COPD exacerbation    Patient seen and examined at bed side    SOB improving    Oxygen  Duonebs   Steroids    Moniter O2 sat      Will follow

## 2025-02-11 NOTE — H&P
V2.0  History and Physical      Name:  Cass Valerio /Age/Sex: 1970  (54 y.o. female)   MRN & CSN:  [id number] & [id number] Encounter Date/Time: 2025 4:28 AM EST   Location:   PCP: Wolfgang Caraballo MD       Hospital Day: 2    Assessment and Plan:   Cass Valerio is a 54 y.o. female with a pmh of asthma, COPD, tobacco dependence, substance abuse, anxiety depression, fibromyalgia who presents with Acute respiratory failure with hypoxia and hypercapnia    Hospital Problems             Last Modified POA    * (Principal) Acute respiratory failure with hypoxia and hypercapnia 2025 Yes       Plan:  # Acute hypoxic hypercapnic respiratory failure  #Asthma versus COPD exacerbation  #Community-acquired pneumonia  - When patient arrived to the ED she was hypoxic, initial VBG 7.123 pH, pCO2 80.1, patient was placed on 6 L oxygen.  Repeat VBG pH 7.316, pCO2 52.6  - Currently patient requiring 3 L oxygen saturating well  - COVID and flu negative  - WBC 11.1, will obtain procalcitonin  - CTA of chest no PE.  Diffuse bronchial wall thickening and groundglass opacity in the left lower lobe and right upper lobe suggesting of infection  - In the ED patient received IV magnesium, high-dose Solu-Medrol and breathing treatment  - Will obtain sputum culture, pneumonia panel  - Will place patient on Solu-Medrol, azithromycin and ceftriaxone  - Will provide breathing treatment as needed    #Substance abuse  #Somnolent secondary to above  - UDS positive for meth    #Tobacco dependency continues to smoke    DVT Prophylaxis: Lovenox  Diet: Regular diet  Code Status: Full code    Disposition:   Current Living situation: Home  Expected Disposition: Home  Estimated D/C: 3-4    Diet ADULT DIET; Regular   DVT Prophylaxis [x] Lovenox, []  Heparin, [] SCDs, [] Ambulation,  [] Eliquis, [] Xarelto, [] Coumadin   Code Status Full Code   Surrogate Decision Maker/ POA            History from:     patient    History of  diagnostic; and Total hip arthroplasty (Right, 9/26/2023).  Allergies:   Allergies   Allergen Reactions    Gabapentin Other (See Comments)     Confused    Penicillins      Swelling    Morphine Rash    Morphine And Codeine Rash     Fam HX:  family history includes Asthma in her father; COPD in her mother.  Soc HX:   Social History     Socioeconomic History    Marital status: Single    Number of children: 2   Tobacco Use    Smoking status: Never    Smokeless tobacco: Never    Tobacco comments:     encouraged to never smoke    Vaping Use    Vaping status: Never Used   Substance and Sexual Activity    Alcohol use: No     Alcohol/week: 0.0 standard drinks of alcohol    Drug use: Yes     Types: Opiates      Comment: snort heroine    Sexual activity: Not Currently       Medications:   Medications:    sodium chloride flush  5-40 mL IntraVENous 2 times per day    enoxaparin  40 mg SubCUTAneous Daily    methylPREDNISolone  40 mg IntraVENous Daily    cefTRIAXone (ROCEPHIN) IV  1,000 mg IntraVENous Q24H    azithromycin  500 mg IntraVENous Q24H      Infusions:    sodium chloride       PRN Meds: albuterol sulfate HFA, 2 puff, Q4H PRN  sodium chloride flush, 5-40 mL, PRN  sodium chloride, , PRN  potassium chloride, 40 mEq, PRN   Or  potassium alternative oral replacement, 40 mEq, PRN   Or  potassium chloride, 10 mEq, PRN  magnesium sulfate, 2,000 mg, PRN  ondansetron, 4 mg, Q8H PRN   Or  ondansetron, 4 mg, Q6H PRN  polyethylene glycol, 17 g, Daily PRN  acetaminophen, 650 mg, Q6H PRN   Or  acetaminophen, 650 mg, Q6H PRN        Labs      CBC:   Recent Labs     02/10/25  2322   WBC 11.1*   HGB 13.9        BMP:    Recent Labs     02/10/25  2322      K 3.9      CO2 23   BUN 28*   CREATININE 0.9   GLUCOSE 185*     Hepatic:   Recent Labs     02/10/25  2322   AST 29   ALT 26   BILITOT 0.3   ALKPHOS 118     Lipids: No results found for: \"CHOL\", \"HDL\", \"TRIG\"  Hemoglobin A1C:   Lab Results   Component Value Date/Time

## 2025-02-11 NOTE — PROGRESS NOTES
Medication Reconciliation    List of medications patient is currently taking is complete.     Source of information: 1. Conversation with patient at bedside                                      2. EPIC records      Mio Franco RPH, PharmD, BCPS  2/11/2025 10:50 AM

## 2025-02-11 NOTE — ED NOTES
Patient states \" if its my time to go, let me go\" RN educated patient Patient states \"I do NOT want a tube down my throat to breath\".

## 2025-02-11 NOTE — ED PROVIDER NOTES
Negative <200 ng/mL    Cannabinoid Scrn, Ur Neg Negative <50 ng/mL    Cocaine Metabolite Screen, Urine Neg Negative <300 ng/mL    Opiate Screen, Urine Neg Negative <300 ng/mL    Phencyclidine (PCP), Screen, Urine Neg Negative <25 ng/mL    Methadone Screen, Urine Neg Negative <300 ng/mL    Oxycodone Urine Neg Negative <100 ng/ml    FENTANYL SCREEN, URINE Neg Negative <5 ng/mL    pH, Urine 5.0     Drug Screen Comment: see below    SPECIMEN REJECTION   Result Value Ref Range    Rejected Test vbg     Reason for Rejection see below    Blood Gas, Venous   Result Value Ref Range    pH, Evaristo 7.316 (L) 7.350 - 7.450    pCO2, Evaristo 52.6 (H) 40.0 - 50.0 mmHg    PO2, Evaristo 51 Not Established mmHg    HCO3, Venous 27 23 - 29 mmol/L    Base Excess, Evaristo -0.3 Not Established mmol/L    O2 Sat, Evaristo 84 Not Established %    Carboxyhemoglobin 0.8 %    MetHgb, Evaristo 0.7 <1.5 %    TC02 (Calc), Evaristo 28 Not Established mmol/L    O2 Therapy Unknown    EKG 12 Lead (SOB)   Result Value Ref Range    Ventricular Rate 105 BPM    Atrial Rate 105 BPM    P-R Interval 156 ms    QRS Duration 96 ms    Q-T Interval 370 ms    QTc Calculation (Bazett) 489 ms    P Axis 81 degrees    R Axis 49 degrees    T Axis 58 degrees    Diagnosis       Sinus tachycardiaBiatrial enlargementAbnormal ECGWhen compared with ECG of 19-SEP-2024 12:39,No significant change was found         RADIOLOGY  I have reviewed all radiographic studies for this visit.   XR SHOULDER RIGHT (MIN 2 VIEWS)   Preliminary Result   Subtle nondisplaced fracture of the greater tuberosity.         CT CHEST PULMONARY EMBOLISM W CONTRAST   Preliminary Result   1. No pulmonary embolus.   2. Diffuse bronchial wall thickening and ground-glass opacities in the left   lower lobe and right upper lobe suggestive of infection.   3. Subtle nondisplaced fracture of the right greater tuberosity.         XR CHEST PORTABLE   Preliminary Result   1. No acute cardiopulmonary disease.                My ECG  interpretation    Sinus tachycardia, ventricular rate of 105, normal axis, no STEMI    ED COURSE/MDM    54 y.o. female presenting to the ED for respiratory distress.  Patient satting 69% on room air in the field.  EMS attempted to place patient on nasal cannula however she was combative.  Patient was given 10 mg of Versed IM prior to ED arrival and became hypersomnolent.  Patient placed on nonrebreather on arrival.  Differential diagnose includes but not limited to COPD exacerbation, viral illness, pneumonia, CHF exacerbation, ACS.  GCS is 11 on arrival.    EKG nonischemic.  Patient started on DuoNeb therapy x 3.  Patient administered 125 mg of Solu-Medrol.  2 g of magnesium ordered to be given over 1 hour.  Patient currently satting 99% on nonrebreather.  Unfortunately right now patient is not appropriate GCS to be placed on BiPAP due to administration of Versed prior to ED arrival.  We will trial patient on nonrebreather or high flow nasal cannula and continue to monitor.  Initial gas shows a respiratory acidosis with a pH of 7.132 and a CO2 retention of 80.1.    Update: Patient's breathing has improved after administration of medication, she is starting to become more alert and oriented as well.  We will consider placing patient on BiPAP.  Given crackles and B-lines on point-of-care ultrasound at the bedside we will administer 40 mg of IV Lasix.  Hare will be placed as well for monitoring ins and outs.  There is a component of CHF that could be contributing patient's respiratory distress on arrival versus asthma/COPD exacerbation.      Update: Patient currently on 6 L cannula.  She did become more responsive and endorsed that she use methamphetamine.  Patient's urine drug screen positive for methamphetamine and benzodiazepine.  Benzodiazepine most likely positive from administration of Versed by EMS.  COVID and influenza negative.  Troponin within normal limits, CMP within normal limits,'s CBC shows a slight

## 2025-02-11 NOTE — PROGRESS NOTES
4 Eyes Skin Assessment     NAME:  Cass Valerio  YOB: 1970  MEDICAL RECORD NUMBER:  [id number]    The patient is being assessed for  Admission    I agree that at least one RN has performed a thorough Head to Toe Skin Assessment on the patient. ALL assessment sites listed below have been assessed.      Areas assessed by both nurses:    Head, Face, Ears, Shoulders, Back, Chest, Arms, Elbows, Hands, Sacrum. Buttock, Coccyx, Ischium, Legs. Feet and Heels, and Under Medical Devices         Does the Patient have a Wound? No noted wound(s)       Ramana Prevention initiated by RN: No  Wound Care Orders initiated by RN: No    Pressure Injury (Stage 3,4, Unstageable, DTI, NWPT, and Complex wounds) if present, place Wound referral order by RN under : No    New Ostomies, if present place, Ostomy referral order under : No     Nurse 1 eSignature: Electronically signed by Stefany Tejada RN on 2/11/25 at 5:59 PM EST    **SHARE this note so that the co-signing nurse can place an eSignature**    Nurse 2 eSignature: {Esignature:[id number]}

## 2025-02-11 NOTE — PROGRESS NOTES
Breath sounds and pt's mental status did improve after Duoneb treatments. Pt's mental status is still questionable for pt to be put on bipap. MD hernandes

## 2025-02-11 NOTE — ED NOTES
Pt ambulated to the restroom with a steady gait, pt continues to take oxygen off and is maintaining % on room air.

## 2025-02-12 VITALS
HEART RATE: 104 BPM | SYSTOLIC BLOOD PRESSURE: 141 MMHG | HEIGHT: 60 IN | DIASTOLIC BLOOD PRESSURE: 90 MMHG | WEIGHT: 154.1 LBS | OXYGEN SATURATION: 96 % | RESPIRATION RATE: 18 BRPM | TEMPERATURE: 97.9 F | BODY MASS INDEX: 30.25 KG/M2

## 2025-02-12 LAB
ALBUMIN SERPL-MCNC: 4.1 G/DL (ref 3.4–5)
ALBUMIN/GLOB SERPL: 1.8 {RATIO} (ref 1.1–2.2)
ALP SERPL-CCNC: 115 U/L (ref 40–129)
ALT SERPL-CCNC: 23 U/L (ref 10–40)
ANION GAP SERPL CALCULATED.3IONS-SCNC: 8 MMOL/L (ref 3–16)
AST SERPL-CCNC: 25 U/L (ref 15–37)
BASOPHILS # BLD: 0 K/UL (ref 0–0.2)
BASOPHILS NFR BLD: 0.2 %
BILIRUB SERPL-MCNC: <0.2 MG/DL (ref 0–1)
BUN SERPL-MCNC: 27 MG/DL (ref 7–20)
CALCIUM SERPL-MCNC: 9 MG/DL (ref 8.3–10.6)
CHLORIDE SERPL-SCNC: 105 MMOL/L (ref 99–110)
CO2 SERPL-SCNC: 26 MMOL/L (ref 21–32)
CREAT SERPL-MCNC: 0.7 MG/DL (ref 0.6–1.1)
DEPRECATED RDW RBC AUTO: 14.1 % (ref 12.4–15.4)
EOSINOPHIL # BLD: 0 K/UL (ref 0–0.6)
EOSINOPHIL NFR BLD: 0 %
GFR SERPLBLD CREATININE-BSD FMLA CKD-EPI: >90 ML/MIN/{1.73_M2}
GLUCOSE SERPL-MCNC: 130 MG/DL (ref 70–99)
HCT VFR BLD AUTO: 42 % (ref 36–48)
HGB BLD-MCNC: 14 G/DL (ref 12–16)
LYMPHOCYTES # BLD: 0.9 K/UL (ref 1–5.1)
LYMPHOCYTES NFR BLD: 7.7 %
MCH RBC QN AUTO: 28 PG (ref 26–34)
MCHC RBC AUTO-ENTMCNC: 33.3 G/DL (ref 31–36)
MCV RBC AUTO: 84 FL (ref 80–100)
MONOCYTES # BLD: 0.5 K/UL (ref 0–1.3)
MONOCYTES NFR BLD: 4.3 %
NEUTROPHILS # BLD: 10.5 K/UL (ref 1.7–7.7)
NEUTROPHILS NFR BLD: 87.8 %
PLATELET # BLD AUTO: 318 K/UL (ref 135–450)
PMV BLD AUTO: 7.6 FL (ref 5–10.5)
POTASSIUM SERPL-SCNC: 4.5 MMOL/L (ref 3.5–5.1)
PROT SERPL-MCNC: 6.4 G/DL (ref 6.4–8.2)
RBC # BLD AUTO: 4.99 M/UL (ref 4–5.2)
SODIUM SERPL-SCNC: 139 MMOL/L (ref 136–145)
WBC # BLD AUTO: 12 K/UL (ref 4–11)

## 2025-02-12 PROCEDURE — 94760 N-INVAS EAR/PLS OXIMETRY 1: CPT

## 2025-02-12 PROCEDURE — 6370000000 HC RX 637 (ALT 250 FOR IP)

## 2025-02-12 PROCEDURE — 2580000003 HC RX 258

## 2025-02-12 PROCEDURE — 6370000000 HC RX 637 (ALT 250 FOR IP): Performed by: HOSPITALIST

## 2025-02-12 PROCEDURE — 85025 COMPLETE CBC W/AUTO DIFF WBC: CPT

## 2025-02-12 PROCEDURE — 2500000003 HC RX 250 WO HCPCS

## 2025-02-12 PROCEDURE — 6360000002 HC RX W HCPCS: Performed by: HOSPITALIST

## 2025-02-12 PROCEDURE — 6360000002 HC RX W HCPCS

## 2025-02-12 PROCEDURE — 80053 COMPREHEN METABOLIC PANEL: CPT

## 2025-02-12 PROCEDURE — 6370000000 HC RX 637 (ALT 250 FOR IP): Performed by: INTERNAL MEDICINE

## 2025-02-12 PROCEDURE — 94640 AIRWAY INHALATION TREATMENT: CPT

## 2025-02-12 RX ORDER — WATER 10 ML/10ML
INJECTION INTRAMUSCULAR; INTRAVENOUS; SUBCUTANEOUS
Status: COMPLETED
Start: 2025-02-12 | End: 2025-02-12

## 2025-02-12 RX ORDER — ONDANSETRON 4 MG/1
4 TABLET, ORALLY DISINTEGRATING ORAL 3 TIMES DAILY PRN
Qty: 21 TABLET | Refills: 0 | Status: SHIPPED | OUTPATIENT
Start: 2025-02-12

## 2025-02-12 RX ORDER — DOXYCYCLINE HYCLATE 100 MG
100 TABLET ORAL 2 TIMES DAILY
Qty: 10 TABLET | Refills: 0 | Status: SHIPPED | OUTPATIENT
Start: 2025-02-12 | End: 2025-02-17

## 2025-02-12 RX ORDER — PREDNISONE 20 MG/1
40 TABLET ORAL DAILY
Qty: 10 TABLET | Refills: 0 | Status: SHIPPED | OUTPATIENT
Start: 2025-02-12 | End: 2025-02-17

## 2025-02-12 RX ORDER — BENZONATATE 100 MG/1
100 CAPSULE ORAL 3 TIMES DAILY PRN
Qty: 20 CAPSULE | Refills: 0 | Status: SHIPPED | OUTPATIENT
Start: 2025-02-12 | End: 2025-02-19

## 2025-02-12 RX ADMIN — ONDANSETRON 4 MG: 2 INJECTION, SOLUTION INTRAMUSCULAR; INTRAVENOUS at 05:53

## 2025-02-12 RX ADMIN — WATER 1000 MG: 1 INJECTION INTRAMUSCULAR; INTRAVENOUS; SUBCUTANEOUS at 05:45

## 2025-02-12 RX ADMIN — OXYCODONE HYDROCHLORIDE AND ACETAMINOPHEN 1 TABLET: 5; 325 TABLET ORAL at 05:29

## 2025-02-12 RX ADMIN — IPRATROPIUM BROMIDE AND ALBUTEROL SULFATE 1 DOSE: 2.5; .5 SOLUTION RESPIRATORY (INHALATION) at 12:19

## 2025-02-12 RX ADMIN — IPRATROPIUM BROMIDE AND ALBUTEROL SULFATE 1 DOSE: 2.5; .5 SOLUTION RESPIRATORY (INHALATION) at 07:42

## 2025-02-12 RX ADMIN — WATER 1 ML: 1 INJECTION INTRAMUSCULAR; INTRAVENOUS; SUBCUTANEOUS at 08:32

## 2025-02-12 RX ADMIN — ALBUTEROL SULFATE 2 PUFF: 90 AEROSOL, METERED RESPIRATORY (INHALATION) at 06:09

## 2025-02-12 RX ADMIN — BENZONATATE 100 MG: 100 CAPSULE ORAL at 05:29

## 2025-02-12 RX ADMIN — OXYCODONE HYDROCHLORIDE AND ACETAMINOPHEN 1 TABLET: 5; 325 TABLET ORAL at 10:34

## 2025-02-12 RX ADMIN — OXYMETAZOLINE HYDROCHLORIDE 2 SPRAY: 0.5 SPRAY NASAL at 08:32

## 2025-02-12 RX ADMIN — SODIUM CHLORIDE, PRESERVATIVE FREE 10 ML: 5 INJECTION INTRAVENOUS at 08:40

## 2025-02-12 RX ADMIN — BUDESONIDE AND FORMOTEROL FUMARATE DIHYDRATE 2 PUFF: 160; 4.5 AEROSOL RESPIRATORY (INHALATION) at 07:42

## 2025-02-12 RX ADMIN — METHYLPREDNISOLONE SODIUM SUCCINATE 40 MG: 40 INJECTION INTRAMUSCULAR; INTRAVENOUS at 08:32

## 2025-02-12 RX ADMIN — AZITHROMYCIN MONOHYDRATE 500 MG: 500 INJECTION, POWDER, LYOPHILIZED, FOR SOLUTION INTRAVENOUS at 05:53

## 2025-02-12 ASSESSMENT — PAIN DESCRIPTION - ORIENTATION
ORIENTATION: RIGHT

## 2025-02-12 ASSESSMENT — PAIN DESCRIPTION - DESCRIPTORS
DESCRIPTORS: ACHING;THROBBING

## 2025-02-12 ASSESSMENT — PAIN DESCRIPTION - LOCATION
LOCATION: SHOULDER

## 2025-02-12 ASSESSMENT — PAIN - FUNCTIONAL ASSESSMENT
PAIN_FUNCTIONAL_ASSESSMENT: ACTIVITIES ARE NOT PREVENTED

## 2025-02-12 ASSESSMENT — PAIN DESCRIPTION - ONSET
ONSET: ON-GOING

## 2025-02-12 ASSESSMENT — PAIN SCALES - GENERAL
PAINLEVEL_OUTOF10: 7
PAINLEVEL_OUTOF10: 6
PAINLEVEL_OUTOF10: 8
PAINLEVEL_OUTOF10: 5
PAINLEVEL_OUTOF10: 5

## 2025-02-12 ASSESSMENT — PAIN DESCRIPTION - FREQUENCY
FREQUENCY: CONTINUOUS

## 2025-02-12 ASSESSMENT — PAIN DESCRIPTION - PAIN TYPE
TYPE: ACUTE PAIN

## 2025-02-12 NOTE — PROGRESS NOTES
CLINICAL PHARMACY NOTE: MEDS TO BEDS    Total # of Prescriptions Filled: 4   The following medications were delivered to the patient:  benzonatate (TESSALON) 100 MG capsule Take 1 capsule by mouth 3 times daily as needed for Cough  Qty: 20 capsule, Refills: 0             doxycycline hyclate (VIBRA-TABS) 100 MG tablet Take 1 tablet by mouth 2 times daily for 5 days  Qty: 10 tablet, Refills: 0      predniSONE (DELTASONE) 20 MG tablet Take 2 tablets by mouth daily for 5 days  Qty: 10 tablet, Refills: 0      ondansetron (ZOFRAN-ODT) 4 MG disintegrating tablet Take 1 tablet by mouth 3 times daily as needed for Nausea or Vomiting  Qty: 21 tablet, Refills: 0              Additional Documentation:  Medication tubed to [facility], Didn't have Mucinex strength in stock so we transferred it out to [facility] on [address] per pt request

## 2025-02-12 NOTE — ACP (ADVANCE CARE PLANNING)
Advance Care Planning     Advance Care Planning Inpatient Note  [facility] Department    Today's Date: 2/12/2025  Unit: [facility] 4N MED SURG    Received request from HealthCare Provider.  Upon review of chart and communication with care team, patient's decision making abilities are not in question.. Patient was/were present in the room during visit.    Goals of ACP Conversation:  Discuss advance care planning documents    Health Care Decision Makers:       Primary Decision Maker: Alyssa Valerio - Child - [phone]    Primary Decision Maker: Belen Nguyen - Child - [phone]  Summary:  Completed New Documents  Updated Healthcare Decision Maker  Documented Next of Kin, per patient report    Advance Care Planning Documents (Patient Wishes):  Living Will/Advance Directive     Assessment:  Patient completed LW docs per her wishes. Patient stated she wants her two daughters, who are her next of kin, to be her decision makers.     Interventions:  Provided education on documents for clarity and greater understanding  Discussed and provided education on state decision maker hierarchy  Assisted in the completion of documents according to patient's wishes at this time    Care Preferences Communicated:   No    Outcomes/Plan:  ACP Discussion: Completed  Copy placed in patient's soft chart    Electronically signed by Chaplain Wisam on 2/12/2025 at 11:08 AM

## 2025-02-12 NOTE — CARE COORDINATION
Quick chart review revealed that patient is from home, has insurance, and PCP, here for acute resp failure, not on O2, will watch needs, but full assmt not required.     Discharge Planning:      (CM) reviewed the patient's chart to assess needs. Patient's Readmission Risk Score is   . Patient's medical insurance is  Payor: [employer] MEDICARE / Plan: HU[employer] GOLD PLUS HMO / Product Type: *No Product type* / .  Patient's PCP is Wolfgang Caraballo MD .  No needs anticipated, at this time. CM team to follow. Staff to inform CM if additional discharge needs arise.    Pts preferred pharmacy is   [id number] - [place], [place] - [address] - P [phone] - F [phone]  [address] [facility] [address]  Phone: [phone] Fax: [phone]    [facility] [id number] - [place], [place] - [address] - P [phone] - F [phone]  [address] [facility] [address]  Phone: [phone] Fax: [phone]    [id number] - [place], [place] - [address] - P [phone] - F [phone]  [address] [facility] [address]  Phone: [phone] Fax: [phone]       Woodrow Coffman LMSW, [facility] Social Work Case Management   Phone: [phone]  Fax: [phone]

## 2025-02-12 NOTE — PROGRESS NOTES
Patient states pain to R shoulder is 6/10 at this time. Patient states that \"pain is worse after a coughing fit\". Patient is provided with prn mucinex at this time, and is educated on how to use incentive spirometer. Patient demonstrates understanding with teachback method. Patient is provided with heat therapy to R shoulder for pain control and informed of when prn percocet is available. Patient is agreeable to this, verbalizes no further needs. Bed is locked, and in lowest position. Call light is in reach.     Electronically signed by Keke Burgos RN on 2/11/2025 at 9:04 PM

## 2025-02-12 NOTE — PROGRESS NOTES
Discharge instructions and medications reviewed with patient.  Pt. Verbalized understanding. Denies questions.  Deaccessed right chest port without complications.  Pt. tolerated well.    Electronically signed by Megan Jones RN on 2/12/2025 at 12:39 PM

## 2025-02-12 NOTE — DISCHARGE SUMMARY
Hospital Medicine Discharge Summary    Patient ID: Cass Valerio      Patient's PCP: Wolfgang Caraballo MD    Admit Date: 2/10/2025     Discharge Date:   2/12/2025    Admitting Provider: Shante Vicente MD     Discharge Provider: Lizz Renae MD     Discharge Diagnoses:       Active Hospital Problems    Diagnosis     Acute respiratory failure with hypoxia and hypercapnia [J96.01, J96.02]        The patient was seen and examined on day of discharge and this discharge summary is in conjunction with any daily progress note from day of discharge.    Hospital Course:   Ms. Cass Valerio is a 54-year-old female past medical history of anxiety, depression, fibromyalgia, COPD overlapping with asthma, tobacco dependence.  She presents with COPD exacerbation and pneumonia.  Patient was requiring nasal cannula oxygen which prompted her admission.  Patient was treated with IV antibiotics, steroids, breathing treatment which showed great improvement.  Patient was safely and gradually weaned off the oxygen was feeling very well would like to be discharged back to home.  Patient did have a fall about a month ago and was found to have a subtle nondisplaced fracture of the greater tuberosity, she should follow-up with orthopedic outpatient on discharge.  Patient is follow with PCP in 3 to 5 days or sooner if needed  Patient is to return to the ED if symptoms return      Physical Exam Performed:     BP (!) 141/90   Pulse (!) 104   Temp 97.9 °F (36.6 °C) (Oral)   Resp 18   Ht 1.524 m (5')   Wt 69.9 kg (154 lb 1.6 oz)   LMP 11/15/2018   SpO2 96%   BMI 30.10 kg/m²       General appearance:  No apparent distress,  cooperative.  HEENT:  Normal cephalic, atraumatic without obvious deformity.    Neck: Supple, with full range of motion.  Trachea midline.  Respiratory:  Normal respiratory effort. Clear to auscultation  Cardiovascular:  Regular rate and rhythm with normal S1/S2   Abdomen: Soft, non-tender,    dextromethorphan-guaiFENesin (MUCINEX DM)  MG per extended release tablet Take 1 tablet by mouth 2 times daily as needed for Cough  Qty: 20 tablet, Refills: 0      doxycycline hyclate (VIBRA-TABS) 100 MG tablet Take 1 tablet by mouth 2 times daily for 5 days  Qty: 10 tablet, Refills: 0      predniSONE (DELTASONE) 20 MG tablet Take 2 tablets by mouth daily for 5 days  Qty: 10 tablet, Refills: 0      ondansetron (ZOFRAN-ODT) 4 MG disintegrating tablet Take 1 tablet by mouth 3 times daily as needed for Nausea or Vomiting  Qty: 21 tablet, Refills: 0                Details   Magnesium 400 MG CAPS Take 1 capsule by mouth daily  Qty: 4 capsule, Refills: 0      Spacer/Aero-Holding Chambers VESTA 1 Device by Does not apply route daily as needed (use with inhaler)  Qty: 1 each, Refills: 1      albuterol sulfate HFA (VENTOLIN HFA) 108 (90 Base) MCG/ACT inhaler Inhale 2 puffs into the lungs every 4 hours as needed for Wheezing or Shortness of Breath  Qty: 18 g, Refills: 3      ibuprofen (ADVIL;MOTRIN) 600 MG tablet Take 1 tablet by mouth 3 times daily as needed for Pain or Fever      promethazine (PHENERGAN) 25 MG tablet Take 1 tablet by mouth 3 times daily as needed for Nausea             Time Spent on discharge: 35 in the examination, evaluation, counseling and review of medications and discharge plan.      Signed:    Lizz Renae MD   2/12/2025      Thank you Wolfgang Caraballo MD for the opportunity to be involved in this patient's care. If you have any questions or concerns, please feel free to contact me at [phone].    Comment: Please note this report has been produced using speech recognition software and may contain errors related to that system including errors in grammar, punctuation, and spelling, as well as words and phrases that may be inappropriate. If there are any questions or concerns, please feel free to contact the dictating provider for clarification.

## 2025-02-12 NOTE — PLAN OF CARE
Problem: Chronic Conditions and Co-morbidities  Goal: Patient's chronic conditions and co-morbidity symptoms are monitored and maintained or improved  Outcome: Adequate for Discharge     Problem: Discharge Planning  Goal: Discharge to home or other facility with appropriate resources  Outcome: Adequate for Discharge     Problem: Pain  Goal: Verbalizes/displays adequate comfort level or baseline comfort level  Outcome: Adequate for Discharge     Problem: Safety - Adult  Goal: Free from fall injury  Outcome: Adequate for Discharge     Problem: Anxiety  Goal: Will report anxiety at manageable levels  Description: INTERVENTIONS:  1. Administer medication as ordered  2. Teach and rehearse alternative coping skills  3. Provide emotional support with 1:1 interaction with staff  Outcome: Adequate for Discharge     Problem: Coping  Goal: Pt/Family able to verbalize concerns and demonstrate effective coping strategies  Description: INTERVENTIONS:  1. Assist patient/family to identify coping skills, available support systems and cultural and spiritual values  2. Provide emotional support, including active listening and acknowledgement of concerns of patient and caregivers  3. Reduce environmental stimuli, as able  4. Instruct patient/family in relaxation techniques, as appropriate  5. Assess for spiritual pain/suffering and initiate Spiritual Care, Psychosocial Clinical Specialist consults as needed  Outcome: Adequate for Discharge     Problem: Neurosensory - Adult  Goal: Achieves stable or improved neurological status  Outcome: Adequate for Discharge  Goal: Achieves maximal functionality and self care  Outcome: Adequate for Discharge     Problem: Respiratory - Adult  Goal: Achieves optimal ventilation and oxygenation  Outcome: Adequate for Discharge     Problem: Cardiovascular - Adult  Goal: Maintains optimal cardiac output and hemodynamic stability  Outcome: Adequate for Discharge  Goal: Absence of cardiac dysrhythmias or

## 2025-02-12 NOTE — PLAN OF CARE
Problem: Chronic Conditions and Co-morbidities  Goal: Patient's chronic conditions and co-morbidity symptoms are monitored and maintained or improved  2/11/2025 2059 by Keke Burgos RN  Outcome: Progressing  2/11/2025 1711 by Stefany Tejada RN  Outcome: Progressing     Problem: Discharge Planning  Goal: Discharge to home or other facility with appropriate resources  2/11/2025 2059 by Keke Burgos RN  Outcome: Progressing  2/11/2025 1711 by Stefany Tejada RN  Outcome: Progressing     Problem: Pain  Goal: Verbalizes/displays adequate comfort level or baseline comfort level  2/11/2025 2059 by Keke Burgos RN  Outcome: Progressing  2/11/2025 1711 by Stefany Tejada RN  Outcome: Progressing     Problem: Safety - Adult  Goal: Free from fall injury  2/11/2025 2059 by Keke Burgos RN  Outcome: Progressing  2/11/2025 1711 by Stefnay Tejada RN  Outcome: Progressing     Problem: Anxiety  Goal: Will report anxiety at manageable levels  Description: INTERVENTIONS:  1. Administer medication as ordered  2. Teach and rehearse alternative coping skills  3. Provide emotional support with 1:1 interaction with staff  Outcome: Progressing     Problem: Coping  Goal: Pt/Family able to verbalize concerns and demonstrate effective coping strategies  Description: INTERVENTIONS:  1. Assist patient/family to identify coping skills, available support systems and cultural and spiritual values  2. Provide emotional support, including active listening and acknowledgement of concerns of patient and caregivers  3. Reduce environmental stimuli, as able  4. Instruct patient/family in relaxation techniques, as appropriate  5. Assess for spiritual pain/suffering and initiate Spiritual Care, Psychosocial Clinical Specialist consults as needed  Outcome: Progressing     Problem: Neurosensory - Adult  Goal: Achieves stable or improved neurological status  Outcome: Progressing  Goal: Achieves maximal functionality and self  care  Outcome: Progressing     Problem: Respiratory - Adult  Goal: Achieves optimal ventilation and oxygenation  Outcome: Progressing     Problem: Cardiovascular - Adult  Goal: Maintains optimal cardiac output and hemodynamic stability  Outcome: Progressing  Goal: Absence of cardiac dysrhythmias or at baseline  Outcome: Progressing     Problem: Skin/Tissue Integrity - Adult  Goal: Skin integrity remains intact  Outcome: Progressing  Flowsheets (Taken 2/11/2025 2059)  Skin Integrity Remains Intact:   Monitor for areas of redness and/or skin breakdown   Assess vascular access sites hourly  Goal: Incisions, wounds, or drain sites healing without S/S of infection  Outcome: Progressing  Goal: Oral mucous membranes remain intact  Outcome: Progressing     Problem: Musculoskeletal - Adult  Goal: Return mobility to safest level of function  Outcome: Progressing  Goal: Maintain proper alignment of affected body part  Outcome: Progressing  Goal: Return ADL status to a safe level of function  Outcome: Progressing     Problem: Gastrointestinal - Adult  Goal: Minimal or absence of nausea and vomiting  Outcome: Progressing  Goal: Maintains adequate nutritional intake  Outcome: Progressing     Problem: Infection - Adult  Goal: Absence of infection at discharge  Outcome: Progressing  Goal: Absence of infection during hospitalization  Outcome: Progressing  Goal: Absence of fever/infection during anticipated neutropenic period  Outcome: Progressing     Problem: Metabolic/Fluid and Electrolytes - Adult  Goal: Electrolytes maintained within normal limits  Outcome: Progressing  Goal: Hemodynamic stability and optimal renal function maintained  Outcome: Progressing  Goal: Glucose maintained within prescribed range  Outcome: Progressing     Problem: Hematologic - Adult  Goal: Maintains hematologic stability  Outcome: Progressing

## 2025-03-16 ENCOUNTER — APPOINTMENT (OUTPATIENT)
Dept: GENERAL RADIOLOGY | Age: 55
End: 2025-03-16
Payer: MEDICARE

## 2025-03-16 ENCOUNTER — HOSPITAL ENCOUNTER (EMERGENCY)
Age: 55
Discharge: HOME | End: 2025-03-16
Attending: EMERGENCY MEDICINE
Payer: MEDICARE

## 2025-03-16 VITALS
HEART RATE: 82 BPM | SYSTOLIC BLOOD PRESSURE: 171 MMHG | RESPIRATION RATE: 24 BRPM | DIASTOLIC BLOOD PRESSURE: 103 MMHG | TEMPERATURE: 97.8 F | OXYGEN SATURATION: 96 %

## 2025-03-16 DIAGNOSIS — J45.901 ASTHMA WITH SEVERE EXACERBATION: Primary | ICD-10-CM

## 2025-03-16 PROCEDURE — 6370000000 HC RX 637 (ALT 250 FOR IP): Performed by: EMERGENCY MEDICINE

## 2025-03-16 PROCEDURE — 6360000002 HC RX W HCPCS: Performed by: EMERGENCY MEDICINE

## 2025-03-16 PROCEDURE — 99285 EMERGENCY DEPT VISIT HI MDM: CPT

## 2025-03-16 PROCEDURE — 94640 AIRWAY INHALATION TREATMENT: CPT

## 2025-03-16 PROCEDURE — 2700000000 HC OXYGEN THERAPY PER DAY

## 2025-03-16 PROCEDURE — 94761 N-INVAS EAR/PLS OXIMETRY MLT: CPT

## 2025-03-16 PROCEDURE — 71045 X-RAY EXAM CHEST 1 VIEW: CPT

## 2025-03-16 RX ORDER — PREDNISONE 50 MG/1
50 TABLET ORAL DAILY
Qty: 5 TABLET | Refills: 0 | Status: SHIPPED | OUTPATIENT
Start: 2025-03-17 | End: 2025-03-17

## 2025-03-16 RX ORDER — ALBUTEROL SULFATE 0.83 MG/ML
2.5 SOLUTION RESPIRATORY (INHALATION) ONCE
Status: COMPLETED | OUTPATIENT
Start: 2025-03-16 | End: 2025-03-16

## 2025-03-16 RX ORDER — PREDNISONE 20 MG/1
60 TABLET ORAL ONCE
Status: COMPLETED | OUTPATIENT
Start: 2025-03-16 | End: 2025-03-16

## 2025-03-16 RX ORDER — MAGNESIUM SULFATE 1 G/100ML
1000 INJECTION INTRAVENOUS ONCE
Status: DISCONTINUED | OUTPATIENT
Start: 2025-03-16 | End: 2025-03-16 | Stop reason: HOSPADM

## 2025-03-16 RX ORDER — ALBUTEROL SULFATE 90 UG/1
2 INHALANT RESPIRATORY (INHALATION) EVERY 4 HOURS PRN
Qty: 18 G | Refills: 3 | Status: SHIPPED | OUTPATIENT
Start: 2025-03-16 | End: 2025-03-17

## 2025-03-16 RX ADMIN — PREDNISONE 60 MG: 20 TABLET ORAL at 18:37

## 2025-03-16 RX ADMIN — ALBUTEROL SULFATE 2.5 MG: 0.83 SOLUTION RESPIRATORY (INHALATION) at 19:03

## 2025-03-16 RX ADMIN — ALBUTEROL SULFATE 2.5 MG: 0.83 SOLUTION RESPIRATORY (INHALATION) at 17:33

## 2025-03-16 ASSESSMENT — PAIN - FUNCTIONAL ASSESSMENT
PAIN_FUNCTIONAL_ASSESSMENT: NONE - DENIES PAIN
PAIN_FUNCTIONAL_ASSESSMENT: 0-10

## 2025-03-16 ASSESSMENT — PAIN SCALES - GENERAL: PAINLEVEL_OUTOF10: 1

## 2025-03-16 NOTE — ED NOTES
Patient states she does not want port accessed or blood drawn at this time. She states she has nothing wrong. She states she still wants to leave but is agreeable to another breathing treatment at this time.

## 2025-03-16 NOTE — ED PROVIDER NOTES
[facility] EMERGENCY DEPARTMENT    CHIEF COMPLAINT  Shortness of Breath (Called ems for asthma, ems states hypoxic on their arrival, improved to 96% after one breathing treatment)       HISTORY OF PRESENT ILLNESS  Cass Valerio is a 54 y.o. female who presents to the ED complaining of shortness of breath.  She had a cough for about the past 2 days.  Productive of thick sputum.  She complains of chest tightness but no pain.  Complains of wheezing and shortness of breath.  Feels consistent with an asthma exacerbation.  Tried her albuterol inhaler without significant improvement.  Patient presents from home by EMS who reports patient SpO2 82% on room air on arrival.  Normalized after patient was administered a breathing treatment. DuoNeb x1 given en route.     I have reviewed the following from the nursing documentation:    Past Medical History:   Diagnosis Date    Anxiety and depression     Arthritis     Asthma     Chronic pain syndrome     Complication of joint prosthesis     COPD (chronic obstructive pulmonary disease) (HCC)     Failed back surgical syndrome     Failed neck syndrome     Fibromyalgia     GERD (gastroesophageal reflux disease)     Influenza A 2019    MDRO (multiple drug resistant organisms) resistance 2018    sputum    Obesity (BMI 30-39.9)     Pneumonia      Past Surgical History:   Procedure Laterality Date    ABDOMEN SURGERY      BACK SURGERY       SECTION      x2    ENDOSCOPY, COLON, DIAGNOSTIC      HERNIA REPAIR      JOINT REPLACEMENT      right knee    KNEE ARTHROCENTESIS      removal of bilateral knees    SPINE SURGERY      5  back surgeries     TOTAL HIP ARTHROPLASTY Right 2023    RIGHT TOTAL HIP ARTHROPLASTY, ANTERIOR APPROACH performed by Perez Sun MD at [facility] OR    UPPER GASTROINTESTINAL ENDOSCOPY  2015     Family History   Problem Relation Age of Onset    COPD Mother     Asthma Father      Social History     Socioeconomic History    Marital

## 2025-03-17 RX ORDER — PREDNISONE 50 MG/1
50 TABLET ORAL DAILY
Qty: 5 TABLET | Refills: 0 | Status: SHIPPED | OUTPATIENT
Start: 2025-03-17 | End: 2025-03-22

## 2025-03-17 RX ORDER — ALBUTEROL SULFATE 90 UG/1
2 INHALANT RESPIRATORY (INHALATION) EVERY 4 HOURS PRN
Qty: 18 G | Refills: 3 | Status: SHIPPED | OUTPATIENT
Start: 2025-03-17

## 2025-03-17 NOTE — DISCHARGE INSTRUCTIONS
Dear Cass Valerio,     Thank you for the privilege of caring for you today in the Emergency Department.     Take prednisone, starting tomorrow, as you received a dose here tonight. Inhalers as needed.     Please return to the Emergency Department if you develop any new or worsening symptoms, chest pain, shortness of breath, or for any other concerns.     Regards,     Yordan Heller MD, FACEP

## 2025-03-20 ENCOUNTER — TELEPHONE (OUTPATIENT)
Dept: PRIMARY CARE CLINIC | Age: 55
End: 2025-03-20

## 2025-03-28 ENCOUNTER — TELEPHONE (OUTPATIENT)
Dept: PRIMARY CARE CLINIC | Age: 55
End: 2025-03-28

## 2025-03-28 NOTE — TELEPHONE ENCOUNTER
Patient referral from [facility] ED to evaluate and treat and to establish care with a PCP for routine, follow up and acute reasons      Unable to leave , MyChart request sent, will defer

## 2025-05-16 ENCOUNTER — HOSPITAL ENCOUNTER (EMERGENCY)
Age: 55
Discharge: LEFT BEFORE DISCHARGE | End: 2025-05-17

## 2025-06-05 ENCOUNTER — HOSPITAL ENCOUNTER (EMERGENCY)
Age: 55
Discharge: LEFT BEFORE DISCHARGE | End: 2025-06-05

## 2025-06-20 ENCOUNTER — HOSPITAL ENCOUNTER (EMERGENCY)
Age: 55
Discharge: HOME | End: 2025-06-20
Payer: MEDICARE

## 2025-06-20 ENCOUNTER — APPOINTMENT (OUTPATIENT)
Dept: GENERAL RADIOLOGY | Age: 55
End: 2025-06-20
Payer: MEDICARE

## 2025-06-20 VITALS
RESPIRATION RATE: 17 BRPM | HEART RATE: 82 BPM | OXYGEN SATURATION: 99 % | SYSTOLIC BLOOD PRESSURE: 124 MMHG | HEIGHT: 60 IN | BODY MASS INDEX: 27.61 KG/M2 | TEMPERATURE: 97.7 F | WEIGHT: 140.65 LBS | DIASTOLIC BLOOD PRESSURE: 98 MMHG

## 2025-06-20 DIAGNOSIS — T14.8XXA WOUND INFECTION: ICD-10-CM

## 2025-06-20 DIAGNOSIS — L08.9 WOUND INFECTION: ICD-10-CM

## 2025-06-20 DIAGNOSIS — Z78.9 HISTORY OF DOG BITE: Primary | ICD-10-CM

## 2025-06-20 LAB
ALBUMIN SERPL-MCNC: 3.9 G/DL (ref 3.4–5)
ALBUMIN/GLOB SERPL: 1.6 {RATIO} (ref 1.1–2.2)
ALP SERPL-CCNC: 101 U/L (ref 40–129)
ALT SERPL-CCNC: 16 U/L (ref 10–40)
ANION GAP SERPL CALCULATED.3IONS-SCNC: 14 MMOL/L (ref 3–16)
AST SERPL-CCNC: 24 U/L (ref 15–37)
BASOPHILS # BLD: 0 K/UL (ref 0–0.2)
BASOPHILS NFR BLD: 0.8 %
BILIRUB SERPL-MCNC: 0.4 MG/DL (ref 0–1)
BUN SERPL-MCNC: 13 MG/DL (ref 7–20)
CALCIUM SERPL-MCNC: 9.1 MG/DL (ref 8.3–10.6)
CHLORIDE SERPL-SCNC: 106 MMOL/L (ref 99–110)
CO2 SERPL-SCNC: 20 MMOL/L (ref 21–32)
CREAT SERPL-MCNC: 0.8 MG/DL (ref 0.6–1.1)
DEPRECATED RDW RBC AUTO: 13.3 % (ref 12.4–15.4)
EOSINOPHIL # BLD: 1 K/UL (ref 0–0.6)
EOSINOPHIL NFR BLD: 18.3 %
GFR SERPLBLD CREATININE-BSD FMLA CKD-EPI: 86 ML/MIN/{1.73_M2}
GLUCOSE SERPL-MCNC: 91 MG/DL (ref 70–99)
HCT VFR BLD AUTO: 41.5 % (ref 36–48)
HGB BLD-MCNC: 14.2 G/DL (ref 12–16)
LACTATE BLDV-SCNC: 1.4 MMOL/L (ref 0.4–1.9)
LYMPHOCYTES # BLD: 1.5 K/UL (ref 1–5.1)
LYMPHOCYTES NFR BLD: 26.5 %
MCH RBC QN AUTO: 28.4 PG (ref 26–34)
MCHC RBC AUTO-ENTMCNC: 34.3 G/DL (ref 31–36)
MCV RBC AUTO: 82.9 FL (ref 80–100)
MONOCYTES # BLD: 0.4 K/UL (ref 0–1.3)
MONOCYTES NFR BLD: 6.5 %
NEUTROPHILS # BLD: 2.6 K/UL (ref 1.7–7.7)
NEUTROPHILS NFR BLD: 47.9 %
PLATELET # BLD AUTO: 286 K/UL (ref 135–450)
PMV BLD AUTO: 7.1 FL (ref 5–10.5)
POTASSIUM SERPL-SCNC: 4.1 MMOL/L (ref 3.5–5.1)
PROT SERPL-MCNC: 6.3 G/DL (ref 6.4–8.2)
RBC # BLD AUTO: 5.01 M/UL (ref 4–5.2)
SODIUM SERPL-SCNC: 140 MMOL/L (ref 136–145)
WBC # BLD AUTO: 5.5 K/UL (ref 4–11)

## 2025-06-20 PROCEDURE — 96367 TX/PROPH/DG ADDL SEQ IV INF: CPT

## 2025-06-20 PROCEDURE — 96375 TX/PRO/DX INJ NEW DRUG ADDON: CPT

## 2025-06-20 PROCEDURE — 6360000002 HC RX W HCPCS: Performed by: GENERAL ACUTE CARE HOSPITAL

## 2025-06-20 PROCEDURE — 99284 EMERGENCY DEPT VISIT MOD MDM: CPT

## 2025-06-20 PROCEDURE — 96365 THER/PROPH/DIAG IV INF INIT: CPT

## 2025-06-20 PROCEDURE — 73090 X-RAY EXAM OF FOREARM: CPT

## 2025-06-20 PROCEDURE — 2580000003 HC RX 258: Performed by: GENERAL ACUTE CARE HOSPITAL

## 2025-06-20 PROCEDURE — 80053 COMPREHEN METABOLIC PANEL: CPT

## 2025-06-20 PROCEDURE — 87040 BLOOD CULTURE FOR BACTERIA: CPT

## 2025-06-20 PROCEDURE — 83605 ASSAY OF LACTIC ACID: CPT

## 2025-06-20 PROCEDURE — 85025 COMPLETE CBC W/AUTO DIFF WBC: CPT

## 2025-06-20 RX ORDER — KETOROLAC TROMETHAMINE 30 MG/ML
30 INJECTION, SOLUTION INTRAMUSCULAR; INTRAVENOUS ONCE
Status: COMPLETED | OUTPATIENT
Start: 2025-06-20 | End: 2025-06-20

## 2025-06-20 RX ORDER — LEVOFLOXACIN 750 MG/1
750 TABLET, FILM COATED ORAL DAILY
Qty: 7 TABLET | Refills: 0 | Status: SHIPPED | OUTPATIENT
Start: 2025-06-20 | End: 2025-06-27

## 2025-06-20 RX ORDER — CLINDAMYCIN HYDROCHLORIDE 300 MG/1
300 CAPSULE ORAL 4 TIMES DAILY
Qty: 28 CAPSULE | Refills: 0 | Status: SHIPPED | OUTPATIENT
Start: 2025-06-20 | End: 2025-06-27

## 2025-06-20 RX ADMIN — KETOROLAC TROMETHAMINE 30 MG: 30 INJECTION, SOLUTION INTRAMUSCULAR at 11:08

## 2025-06-20 RX ADMIN — CEFEPIME 2000 MG: 2 INJECTION, POWDER, FOR SOLUTION INTRAVENOUS at 11:14

## 2025-06-20 RX ADMIN — VANCOMYCIN HYDROCHLORIDE 1000 MG: 1 INJECTION, POWDER, LYOPHILIZED, FOR SOLUTION INTRAVENOUS at 12:01

## 2025-06-20 ASSESSMENT — ENCOUNTER SYMPTOMS
VOMITING: 0
VOICE CHANGE: 0
SHORTNESS OF BREATH: 0
CHEST TIGHTNESS: 0
WHEEZING: 0
COUGH: 0
ABDOMINAL PAIN: 0
BACK PAIN: 0
NAUSEA: 0
SORE THROAT: 0

## 2025-06-20 ASSESSMENT — PAIN DESCRIPTION - ORIENTATION
ORIENTATION: LEFT

## 2025-06-20 ASSESSMENT — PAIN - FUNCTIONAL ASSESSMENT: PAIN_FUNCTIONAL_ASSESSMENT: 0-10

## 2025-06-20 ASSESSMENT — PAIN DESCRIPTION - LOCATION
LOCATION: ARM

## 2025-06-20 ASSESSMENT — PAIN SCALES - GENERAL
PAINLEVEL_OUTOF10: 2
PAINLEVEL_OUTOF10: 6
PAINLEVEL_OUTOF10: 0
PAINLEVEL_OUTOF10: 8
PAINLEVEL_OUTOF10: 8

## 2025-06-20 NOTE — CONSULTS
Clinical Pharmacy Note  Vancomycin Consult    Pharmacy consult received for one-time dose of vancomycin in the Emergency Department per Yary Perez.    Ht Readings from Last 1 Encounters:   06/20/25 1.524 m (5')        Wt Readings from Last 1 Encounters:   06/20/25 63.8 kg (140 lb 10.5 oz)         Assessment/Plan:  Vancomycin 1,000 mg  x 1 in ED.  If vancomycin is to continue on admission and pharmacy is to manage dosing, please re-consult with admission orders.    Toby Goldstein, PharmD  6/20/2025 10:33 AM

## 2025-06-20 NOTE — ED TRIAGE NOTES
Pt got bite by dog on 6/14. Pt went to  and got stitches placed and put on antibiotics. Pt says that the pain is still there. Bite is on left forearm.

## 2025-06-20 NOTE — DISCHARGE INSTRUCTIONS
Take the prescribed antibiotics as directed until gone.  Apply cool compresses to the affected area for 20 minutes every 3-4 hours.  Watch closely for worsening signs or symptoms of infection.  Call today to establish primary care.  Return immediately to nearest ED for high fever, incessant vomiting, severe pain, or any other worsening symptoms.

## 2025-06-20 NOTE — ED NOTES
Discharge and education instructions reviewed. Patient verbalized understanding, teach-back successful. Patient denied questions at this time. No acute distress noted. Patient instructed to follow-up as noted - return to emergency department if symptoms worsen. Patient verbalized understanding. Discharged per EDMD with discharged instructions.    normal...

## 2025-06-20 NOTE — ED PROVIDER NOTES
**ADVANCED PRACTICE PROVIDER, I HAVE EVALUATED THIS PATIENT**        [facility] EMERGENCY DEPARTMENT  EMERGENCY DEPARTMENT ENCOUNTER      Pt Name: Cass Valerio  MRN:[id number]  Birthdate 1970  Date of evaluation: 6/20/2025  Provider: HA Vernon CNP  Note Started: 10:35 AM EDT 6/20/25        Chief Complaint:    Chief Complaint   Patient presents with    Animal Bite     Pt got bite by dog on 6/14. Pt went to  and got stitches placed and put on antibiotics. Pt says that the pain is still there. Bite is on left forearm.          Nursing Notes, Past Medical Hx, Past Surgical Hx, Social Hx, Allergies, and Family Hx were all reviewed and agreed with or any disagreements were addressed in the HPI.    HPI: (Location, Duration, Timing, Severity, Quality, Assoc Sx, Context, Modifying factors)    History From: Patient  Limitations to history : None    Social Determinants Significantly Affecting Health : None    Chief Complaint-    This is a  54 y.o. female who presents to the emergency department today for evaluation of a possible wound infection.  Patient states that she sustained a dog bite on June 14, was evaluated by  ER, states that she had 15 absorbable stitches placed at that time.  She was discharged with prescription for Augmentin, states that she has been taking the Augmentin as directed.  Over the last 2 days she has noticed increased pain, redness, and swelling to the left forearm.  She is left-hand dominant.  She denies wound drainage.  She currently reports a pain level of 8 out of 10, describes this pain as constant, dull, and throbbing.  The pain is nonmigratory.  Pain is worse with movement and with touching the affected area.  She has not taken anything for her symptoms.  She does report chills but no documented fever.  She has not followed up as directed, states she does not have a PCP.    15 absorbable sutures were placed    PastMedical/Surgical History:      Diagnosis Date     This area is tender to palpation.  There is no lymphangitic streaking.  Range of motion neurovascular status is intact.    Differential diagnoses include but not limited to abscess, cellulitis, sepsis, myositis, retained foreign body    IV access established.  Laboratory and imaging studies pending.  Empiric IV antibiotic therapy initiated with IV vancomycin and IV cefepime.  She is medicated with IV Toradol 30 mg for discomfort.  She will be reassessed.    Laboratory studies show normal sodium of 140.  Potassium is normal at 4.1.  CO2 mildly decreased at 20.  There is no significant electrolyte derangement or VICENTE.  No LFT elevation.  Lactic within normal limits.  White blood cell count within normal limits.  H&H is stable.    Left forearm x-ray  interpreted by radiologist and reviewed by myself shows:  Soft tissue laceration along the proximal forearm with no evidence of radiopaque foreign body or underlying osseous abnormality.       Disposition Considerations (Tests not ordered but considered, Shared Decision Making, Pt Expectation of Test or Tx.):      ED Course as of 06/20/25 1334   Fri Jun 20, 2025   1333 Patient reassessed multiple times throughout ED visit.  She has remained hemodynamically stable.  She is updated on all test results.  As above, patient did receive empiric IV antibiotic therapy.  At this time there is no evidence of any life-threatening or emergent conditions requiring immediate intervention.  Again no evidence of sepsis, extensive cellulitis, or deep space infection.  No drainable abscess.  Shared decision making employed and patient is agreeable to discharge with emphasis on close outpatient follow-up.  Patient is encouraged to apply cool compresses to the affected area for 20 minutes every 3-4 hours, encouraged to take the prescribed antibiotics as directed until gone.  She is provided with PCP referral and agrees to follow-up as directed.  She agrees return immediately to nearest ED for

## 2025-06-20 NOTE — ED NOTES
Patient refused to allow ED tech to obtain second set of blood cultures. First set was obtained and sent for processing.

## 2025-06-21 LAB — BACTERIA BLD CULT: NORMAL

## 2025-06-24 ENCOUNTER — RESULTS FOLLOW-UP (OUTPATIENT)
Dept: EMERGENCY DEPT | Age: 55
End: 2025-06-24

## 2025-06-24 LAB — BACTERIA BLD CULT: NORMAL

## 2025-08-23 ENCOUNTER — APPOINTMENT (OUTPATIENT)
Dept: GENERAL RADIOLOGY | Age: 55
End: 2025-08-23
Payer: MEDICARE

## 2025-08-23 ENCOUNTER — HOSPITAL ENCOUNTER (EMERGENCY)
Age: 55
Discharge: HOME | End: 2025-08-23
Attending: EMERGENCY MEDICINE
Payer: MEDICARE

## 2025-08-23 VITALS
HEIGHT: 60 IN | RESPIRATION RATE: 19 BRPM | BODY MASS INDEX: 28.57 KG/M2 | WEIGHT: 145.5 LBS | DIASTOLIC BLOOD PRESSURE: 100 MMHG | TEMPERATURE: 98.3 F | SYSTOLIC BLOOD PRESSURE: 171 MMHG | HEART RATE: 74 BPM | OXYGEN SATURATION: 96 %

## 2025-08-23 DIAGNOSIS — I10 ASYMPTOMATIC HYPERTENSION: ICD-10-CM

## 2025-08-23 DIAGNOSIS — J45.901 SEVERE ASTHMA WITH EXACERBATION, UNSPECIFIED WHETHER PERSISTENT: Primary | ICD-10-CM

## 2025-08-23 LAB
ALBUMIN SERPL-MCNC: 3.9 G/DL (ref 3.4–5)
ALBUMIN/GLOB SERPL: 1.7 {RATIO} (ref 1.1–2.2)
ALP SERPL-CCNC: 101 U/L (ref 40–129)
ALT SERPL-CCNC: 22 U/L (ref 10–40)
ANION GAP SERPL CALCULATED.3IONS-SCNC: 10 MMOL/L (ref 3–16)
AST SERPL-CCNC: 20 U/L (ref 15–37)
BASOPHILS # BLD: 0.1 K/UL (ref 0–0.2)
BASOPHILS NFR BLD: 1.3 %
BILIRUB SERPL-MCNC: 0.3 MG/DL (ref 0–1)
BUN SERPL-MCNC: 14 MG/DL (ref 7–20)
CALCIUM SERPL-MCNC: 8.4 MG/DL (ref 8.3–10.6)
CHLORIDE SERPL-SCNC: 106 MMOL/L (ref 99–110)
CO2 SERPL-SCNC: 24 MMOL/L (ref 21–32)
CREAT SERPL-MCNC: 0.7 MG/DL (ref 0.6–1.1)
DEPRECATED RDW RBC AUTO: 13 % (ref 12.4–15.4)
EOSINOPHIL # BLD: 0.9 K/UL (ref 0–0.6)
EOSINOPHIL NFR BLD: 20.8 %
GFR SERPLBLD CREATININE-BSD FMLA CKD-EPI: >90 ML/MIN/{1.73_M2}
GLUCOSE SERPL-MCNC: 118 MG/DL (ref 70–99)
HCT VFR BLD AUTO: 28.1 % (ref 36–48)
HGB BLD-MCNC: 9.5 G/DL (ref 12–16)
LYMPHOCYTES # BLD: 1.3 K/UL (ref 1–5.1)
LYMPHOCYTES NFR BLD: 29.4 %
MAGNESIUM SERPL-MCNC: 2.63 MG/DL (ref 1.8–2.4)
MCH RBC QN AUTO: 28.4 PG (ref 26–34)
MCHC RBC AUTO-ENTMCNC: 33.9 G/DL (ref 31–36)
MCV RBC AUTO: 83.8 FL (ref 80–100)
MONOCYTES # BLD: 0.3 K/UL (ref 0–1.3)
MONOCYTES NFR BLD: 7.5 %
NEUTROPHILS # BLD: 1.8 K/UL (ref 1.7–7.7)
NEUTROPHILS NFR BLD: 41 %
PLATELET # BLD AUTO: 184 K/UL (ref 135–450)
PMV BLD AUTO: 7 FL (ref 5–10.5)
POTASSIUM SERPL-SCNC: 3.1 MMOL/L (ref 3.5–5.1)
PROT SERPL-MCNC: 6.2 G/DL (ref 6.4–8.2)
RBC # BLD AUTO: 3.35 M/UL (ref 4–5.2)
REASON FOR REJECTION: NORMAL
REJECTED TEST: NORMAL
SODIUM SERPL-SCNC: 140 MMOL/L (ref 136–145)
WBC # BLD AUTO: 4.3 K/UL (ref 4–11)

## 2025-08-23 PROCEDURE — 71045 X-RAY EXAM CHEST 1 VIEW: CPT

## 2025-08-23 PROCEDURE — 94640 AIRWAY INHALATION TREATMENT: CPT

## 2025-08-23 PROCEDURE — 94760 N-INVAS EAR/PLS OXIMETRY 1: CPT

## 2025-08-23 PROCEDURE — 6360000002 HC RX W HCPCS: Performed by: EMERGENCY MEDICINE

## 2025-08-23 PROCEDURE — 6370000000 HC RX 637 (ALT 250 FOR IP): Performed by: EMERGENCY MEDICINE

## 2025-08-23 PROCEDURE — 96375 TX/PRO/DX INJ NEW DRUG ADDON: CPT

## 2025-08-23 PROCEDURE — 36415 COLL VENOUS BLD VENIPUNCTURE: CPT

## 2025-08-23 PROCEDURE — 83735 ASSAY OF MAGNESIUM: CPT

## 2025-08-23 PROCEDURE — 96365 THER/PROPH/DIAG IV INF INIT: CPT

## 2025-08-23 PROCEDURE — 80053 COMPREHEN METABOLIC PANEL: CPT

## 2025-08-23 PROCEDURE — 99284 EMERGENCY DEPT VISIT MOD MDM: CPT

## 2025-08-23 PROCEDURE — 85025 COMPLETE CBC W/AUTO DIFF WBC: CPT

## 2025-08-23 RX ORDER — PROCHLORPERAZINE EDISYLATE 5 MG/ML
10 INJECTION INTRAMUSCULAR; INTRAVENOUS ONCE
Status: COMPLETED | OUTPATIENT
Start: 2025-08-23 | End: 2025-08-23

## 2025-08-23 RX ORDER — ALBUTEROL SULFATE 0.83 MG/ML
2.5 SOLUTION RESPIRATORY (INHALATION) ONCE
Status: COMPLETED | OUTPATIENT
Start: 2025-08-23 | End: 2025-08-23

## 2025-08-23 RX ORDER — DIPHENHYDRAMINE HYDROCHLORIDE 50 MG/ML
25 INJECTION, SOLUTION INTRAMUSCULAR; INTRAVENOUS ONCE
Status: COMPLETED | OUTPATIENT
Start: 2025-08-23 | End: 2025-08-23

## 2025-08-23 RX ORDER — MAGNESIUM SULFATE 1 G/100ML
1000 INJECTION INTRAVENOUS ONCE
Status: COMPLETED | OUTPATIENT
Start: 2025-08-23 | End: 2025-08-23

## 2025-08-23 RX ORDER — PREDNISONE 10 MG/1
TABLET ORAL
Qty: 20 TABLET | Refills: 0 | Status: SHIPPED | OUTPATIENT
Start: 2025-08-23 | End: 2025-09-02

## 2025-08-23 RX ORDER — ALBUTEROL SULFATE 90 UG/1
2 INHALANT RESPIRATORY (INHALATION) EVERY 4 HOURS PRN
Qty: 18 G | Refills: 3 | Status: SHIPPED | OUTPATIENT
Start: 2025-08-23

## 2025-08-23 RX ORDER — POTASSIUM CHLORIDE 750 MG/1
40 TABLET, EXTENDED RELEASE ORAL ONCE
Status: COMPLETED | OUTPATIENT
Start: 2025-08-23 | End: 2025-08-23

## 2025-08-23 RX ORDER — PREDNISONE 20 MG/1
60 TABLET ORAL ONCE
Status: COMPLETED | OUTPATIENT
Start: 2025-08-23 | End: 2025-08-23

## 2025-08-23 RX ORDER — KETOROLAC TROMETHAMINE 15 MG/ML
15 INJECTION, SOLUTION INTRAMUSCULAR; INTRAVENOUS ONCE
Status: COMPLETED | OUTPATIENT
Start: 2025-08-23 | End: 2025-08-23

## 2025-08-23 RX ADMIN — DIPHENHYDRAMINE HYDROCHLORIDE 25 MG: 50 INJECTION INTRAMUSCULAR; INTRAVENOUS at 08:49

## 2025-08-23 RX ADMIN — POTASSIUM CHLORIDE 40 MEQ: 750 TABLET, FILM COATED, EXTENDED RELEASE ORAL at 11:19

## 2025-08-23 RX ADMIN — ALBUTEROL SULFATE 2.5 MG: 2.5 SOLUTION RESPIRATORY (INHALATION) at 08:38

## 2025-08-23 RX ADMIN — KETOROLAC TROMETHAMINE 15 MG: 15 INJECTION, SOLUTION INTRAMUSCULAR; INTRAVENOUS at 08:47

## 2025-08-23 RX ADMIN — PREDNISONE 60 MG: 20 TABLET ORAL at 08:56

## 2025-08-23 RX ADMIN — MAGNESIUM SULFATE HEPTAHYDRATE 1000 MG: 1 INJECTION, SOLUTION INTRAVENOUS at 08:55

## 2025-08-23 RX ADMIN — PROCHLORPERAZINE EDISYLATE 10 MG: 5 INJECTION INTRAMUSCULAR; INTRAVENOUS at 08:50

## 2025-08-23 ASSESSMENT — PAIN DESCRIPTION - LOCATION
LOCATION: HEAD
LOCATION: HEAD

## 2025-08-23 ASSESSMENT — PAIN DESCRIPTION - DESCRIPTORS
DESCRIPTORS: ACHING
DESCRIPTORS: ACHING

## 2025-08-23 ASSESSMENT — PAIN - FUNCTIONAL ASSESSMENT
PAIN_FUNCTIONAL_ASSESSMENT: 0-10
PAIN_FUNCTIONAL_ASSESSMENT: 0-10
PAIN_FUNCTIONAL_ASSESSMENT: PREVENTS OR INTERFERES SOME ACTIVE ACTIVITIES AND ADLS

## 2025-08-23 ASSESSMENT — PAIN DESCRIPTION - FREQUENCY: FREQUENCY: CONTINUOUS

## 2025-08-23 ASSESSMENT — PAIN DESCRIPTION - PAIN TYPE: TYPE: ACUTE PAIN

## 2025-08-23 ASSESSMENT — PAIN DESCRIPTION - ONSET: ONSET: PROGRESSIVE

## 2025-08-23 ASSESSMENT — PAIN SCALES - GENERAL
PAINLEVEL_OUTOF10: 8
PAINLEVEL_OUTOF10: 8

## (undated) DEVICE — 3M™ STERI-DRAPE™ U-DRAPE 1015: Brand: STERI-DRAPE™

## (undated) DEVICE — DRESSING ALG W4XL8IN AG FOAM SUPERABSORBENT SIL ANTIMIC

## (undated) DEVICE — RESTRAINT EXT ANK WRST LT BLU FOAM 2 STRP SIDE BCKL HK AND

## (undated) DEVICE — PADDING,UNDERCAST,COTTON, 4"X4YD STERILE: Brand: MEDLINE

## (undated) DEVICE — PACK SURG HIP ASSESSORY

## (undated) DEVICE — SUTURE STRATAFIX SPRL SZ 3-0 L12IN ABSRB UD FS-1 L30X30CM SXMP2B410

## (undated) DEVICE — POSITIONER,HEAD,RING CUSHION,9IN,32CS: Brand: MEDLINE

## (undated) DEVICE — SYRINGE MED 10ML TRNSLUC BRL PLUNG BLK MRK POLYPR CTRL

## (undated) DEVICE — SUTURE VCRL + SZ 2-0 L36IN ABSRB UD L36MM CT-1 1/2 CIR VCP945H

## (undated) DEVICE — SUTURE VCRL + SZ 0 L27IN ABSRB UD L36MM CT-1 1/2 CIR VCPP41D

## (undated) DEVICE — ADHESIVE SKIN CLOSURE WND 8.661X1.5 IN 22 CM LIQUIBAND SECUR

## (undated) DEVICE — C-ARM: Brand: UNBRANDED

## (undated) DEVICE — TOTAL BASIC: Brand: MEDLINE INDUSTRIES, INC.

## (undated) DEVICE — SHEET,DRAPE,53X77,STERILE: Brand: MEDLINE

## (undated) DEVICE — BIPOLAR SEALER 23-112-1 AQM 6.0: Brand: AQUAMANTYS ®

## (undated) DEVICE — SUTURE ABSORBABLE MONOFILAMENT 1-0 OS8 14 IN STRATAFIX SPRL SXPD2B202

## (undated) DEVICE — INVIEW CLEAR LEGGINGS: Brand: CONVERTORS

## (undated) DEVICE — GOWN,AURORA,NONREINF,RAGLAN,XXL,STERILE: Brand: MEDLINE

## (undated) DEVICE — 450 ML BOTTLE OF 0.05% CHLORHEXIDINE GLUCONATE IN 99.95% STERILE WATER FOR IRRIGATION, USP AND APPLICATOR.: Brand: IRRISEPT ANTIMICROBIAL WOUND LAVAGE

## (undated) DEVICE — SUTURE VCRL + SZ 0 L27IN ABSRB UD CT-1 L36MM 1/2 CIR TAPR VCP260H

## (undated) DEVICE — SUTURE ETHBND EXCEL SZ 5 L30IN NONABSORBABLE GRN L48MM V-40 MB46G

## (undated) DEVICE — PAD,NON-ADHERENT,3X8,STERILE,LF,1/PK: Brand: MEDLINE

## (undated) DEVICE — 3M™ COBAN™ NL STERILE NON-LATEX SELF-ADHERENT WRAP, 2084S, 4 IN X 5 YD (10 CM X 4,5 M), 18 ROLLS/CASE: Brand: 3M™ COBAN™

## (undated) DEVICE — SUTURE VCRL SZ 0 L36IN ABSRB UD CT-1 L36MM 1/2 CIR TAPR PNT VCP946H

## (undated) DEVICE — SUTURE VCRL + SZ 2-0 L27IN ABSRB CLR CT-1 1/2 CIR TAPERCUT VCP259H

## (undated) DEVICE — HANDPIECE SET WITH HIGH FLOW TIP AND SUCTION TUBE: Brand: INTERPULSE

## (undated) DEVICE — BIT DRL L30MM DIA3.2MM DISP FOR G7 2 MOBILITY CONSTRUCT

## (undated) DEVICE — SUTURE VCRL + 1 L27IN ABSRB UD CT-1 L36MM 1/2 CIR TAPR PNT VCP261H